# Patient Record
Sex: MALE | Race: WHITE | NOT HISPANIC OR LATINO | Employment: FULL TIME | URBAN - METROPOLITAN AREA
[De-identification: names, ages, dates, MRNs, and addresses within clinical notes are randomized per-mention and may not be internally consistent; named-entity substitution may affect disease eponyms.]

---

## 2018-01-29 ENCOUNTER — HISTORICAL (OUTPATIENT)
Dept: PREADMISSION TESTING | Facility: HOSPITAL | Age: 48
End: 2018-01-29

## 2018-01-29 LAB
ABS NEUT (OLG): 3 X10(3)/MCL (ref 2.1–9.2)
BUN SERPL-MCNC: 12 MG/DL (ref 7–18)
CALCIUM SERPL-MCNC: 9.3 MG/DL (ref 8.5–10.1)
CHLORIDE SERPL-SCNC: 101 MMOL/L (ref 98–107)
CO2 SERPL-SCNC: 28 MMOL/L (ref 21–32)
CREAT SERPL-MCNC: 0.82 MG/DL (ref 0.7–1.3)
EOSINOPHIL NFR BLD MANUAL: 6 % (ref 0–8)
ERYTHROCYTE [DISTWIDTH] IN BLOOD BY AUTOMATED COUNT: 12.1 % (ref 11.5–17)
GLUCOSE SERPL-MCNC: 82 MG/DL (ref 74–106)
HCT VFR BLD AUTO: 46.5 % (ref 42–52)
HGB BLD-MCNC: 15.3 GM/DL (ref 14–18)
LYMPHOCYTES NFR BLD MANUAL: 13 % (ref 13–40)
LYMPHOCYTES NFR BLD MANUAL: 4 %
MCH RBC QN AUTO: 29.8 PG (ref 27–31)
MCHC RBC AUTO-ENTMCNC: 32.9 GM/DL (ref 33–36)
MCV RBC AUTO: 90.6 FL (ref 80–94)
MONOCYTES NFR BLD MANUAL: 15 % (ref 2–11)
NEUTROPHILS NFR BLD MANUAL: 62 % (ref 47–80)
PLATELET # BLD AUTO: 160 X10(3)/MCL (ref 130–400)
PLATELET # BLD EST: NORMAL 10*3/UL
PMV BLD AUTO: 9.7 FL (ref 7.4–10.4)
POTASSIUM SERPL-SCNC: 4.2 MMOL/L (ref 3.5–5.1)
RBC # BLD AUTO: 5.13 X10(6)/MCL (ref 4.7–6.1)
SODIUM SERPL-SCNC: 136 MMOL/L (ref 136–145)
WBC # SPEC AUTO: 6 X10(3)/MCL (ref 4.5–11.5)

## 2018-02-16 ENCOUNTER — HISTORICAL (OUTPATIENT)
Dept: SURGERY | Facility: HOSPITAL | Age: 48
End: 2018-02-16

## 2018-03-12 ENCOUNTER — HISTORICAL (OUTPATIENT)
Dept: ADMINISTRATIVE | Facility: HOSPITAL | Age: 48
End: 2018-03-12

## 2020-05-04 ENCOUNTER — HISTORICAL (OUTPATIENT)
Dept: ADMINISTRATIVE | Facility: HOSPITAL | Age: 50
End: 2020-05-04

## 2020-05-12 ENCOUNTER — HISTORICAL (OUTPATIENT)
Dept: RADIOLOGY | Facility: HOSPITAL | Age: 50
End: 2020-05-12

## 2020-06-22 LAB
ABS NEUT (OLG): 2.22 X10(3)/MCL (ref 2.1–9.2)
BASOPHILS # BLD AUTO: 0.03 X10(3)/MCL (ref 0–0.2)
BASOPHILS NFR BLD AUTO: 0.6 % (ref 0–0.9)
BUN SERPL-MCNC: 15.4 MG/DL (ref 8.9–20.6)
CALCIUM SERPL-MCNC: 9.7 MG/DL (ref 8.4–10.2)
CHLORIDE SERPL-SCNC: 103 MMOL/L (ref 98–107)
CO2 SERPL-SCNC: 27 MMOL/L (ref 22–29)
CREAT SERPL-MCNC: 0.8 MG/DL (ref 0.72–1.25)
CREAT/UREA NIT SERPL: 19
EOSINOPHIL # BLD AUTO: 0.21 X10(3)/MCL (ref 0–0.9)
EOSINOPHIL NFR BLD AUTO: 4.4 % (ref 0–6.5)
ERYTHROCYTE [DISTWIDTH] IN BLOOD BY AUTOMATED COUNT: 12.3 % (ref 11.5–17)
GLUCOSE SERPL-MCNC: 104 MG/DL (ref 74–100)
HCT VFR BLD AUTO: 42.4 % (ref 42–52)
HGB BLD-MCNC: 14.5 GM/DL (ref 14–18)
IMM GRANULOCYTES # BLD AUTO: 0.03 10*3/UL (ref 0–0.02)
IMM GRANULOCYTES NFR BLD AUTO: 0.6 % (ref 0–0.43)
LYMPHOCYTES # BLD AUTO: 1.61 X10(3)/MCL (ref 0.6–4.6)
LYMPHOCYTES NFR BLD AUTO: 33.5 % (ref 16.2–38.3)
MCH RBC QN AUTO: 29.8 PG (ref 27–31)
MCHC RBC AUTO-ENTMCNC: 34.2 GM/DL (ref 33–36)
MCV RBC AUTO: 87.1 FL (ref 80–94)
MONOCYTES # BLD AUTO: 0.7 X10(3)/MCL (ref 0.1–1.3)
MONOCYTES NFR BLD AUTO: 14.6 % (ref 4.7–11.3)
NEUTROPHILS # BLD AUTO: 2.22 X10(3)/MCL (ref 2.1–9.2)
NEUTROPHILS NFR BLD AUTO: 46.3 % (ref 49.1–73.4)
NRBC BLD AUTO-RTO: 0 % (ref 0–0.2)
PLATELET # BLD AUTO: 160 X10(3)/MCL (ref 130–400)
PMV BLD AUTO: 9.9 FL (ref 7.4–10.4)
POTASSIUM SERPL-SCNC: 4.4 MMOL/L (ref 3.5–5.1)
RBC # BLD AUTO: 4.87 X10(6)/MCL (ref 4.7–6.1)
SODIUM SERPL-SCNC: 140 MMOL/L (ref 136–145)
WBC # SPEC AUTO: 4.8 X10(3)/MCL (ref 4.5–11.5)

## 2020-07-07 ENCOUNTER — HISTORICAL (OUTPATIENT)
Dept: SURGERY | Facility: HOSPITAL | Age: 50
End: 2020-07-07

## 2021-08-16 ENCOUNTER — HISTORICAL (OUTPATIENT)
Dept: ADMINISTRATIVE | Facility: HOSPITAL | Age: 51
End: 2021-08-16

## 2021-11-11 ENCOUNTER — HISTORICAL (OUTPATIENT)
Dept: LAB | Facility: HOSPITAL | Age: 51
End: 2021-11-11

## 2021-11-11 LAB
ABS NEUT (OLG): 3.53 X10(3)/MCL (ref 2.1–9.2)
BASOPHILS # BLD AUTO: 0.03 X10(3)/MCL (ref 0–0.2)
BASOPHILS NFR BLD AUTO: 0.5 % (ref 0–0.9)
BUN SERPL-MCNC: 19.5 MG/DL (ref 8.4–25.7)
CALCIUM SERPL-MCNC: 9.3 MG/DL (ref 8.4–10.2)
CHLORIDE SERPL-SCNC: 103 MMOL/L (ref 98–107)
CO2 SERPL-SCNC: 28 MMOL/L (ref 22–29)
CREAT SERPL-MCNC: 1.19 MG/DL (ref 0.72–1.25)
CREAT/UREA NIT SERPL: 16
EOSINOPHIL # BLD AUTO: 0.16 X10(3)/MCL (ref 0–0.9)
EOSINOPHIL NFR BLD AUTO: 2.6 % (ref 0–6.5)
ERYTHROCYTE [DISTWIDTH] IN BLOOD BY AUTOMATED COUNT: 12.6 % (ref 11.5–17)
GLUCOSE SERPL-MCNC: 87 MG/DL (ref 74–100)
HCT VFR BLD AUTO: 44.4 % (ref 42–52)
HGB BLD-MCNC: 14.7 GM/DL (ref 14–18)
IMM GRANULOCYTES # BLD AUTO: 0.03 10*3/UL (ref 0–0.02)
IMM GRANULOCYTES NFR BLD AUTO: 0.5 % (ref 0–0.43)
LYMPHOCYTES # BLD AUTO: 1.73 X10(3)/MCL (ref 0.6–4.6)
LYMPHOCYTES NFR BLD AUTO: 27.8 % (ref 16.2–38.3)
MCH RBC QN AUTO: 30.2 PG (ref 27–31)
MCHC RBC AUTO-ENTMCNC: 33.1 GM/DL (ref 33–36)
MCV RBC AUTO: 91.4 FL (ref 80–94)
MONOCYTES # BLD AUTO: 0.75 X10(3)/MCL (ref 0.1–1.3)
MONOCYTES NFR BLD AUTO: 12 % (ref 4.7–11.3)
NEUTROPHILS # BLD AUTO: 3.53 X10(3)/MCL (ref 2.1–9.2)
NEUTROPHILS NFR BLD AUTO: 56.6 % (ref 49.1–73.4)
NRBC BLD AUTO-RTO: 0 % (ref 0–0.2)
PLATELET # BLD AUTO: 175 X10(3)/MCL (ref 130–400)
PMV BLD AUTO: 9.7 FL (ref 7.4–10.4)
POTASSIUM SERPL-SCNC: 4.2 MMOL/L (ref 3.5–5.1)
RBC # BLD AUTO: 4.86 X10(6)/MCL (ref 4.7–6.1)
SODIUM SERPL-SCNC: 140 MMOL/L (ref 136–145)
WBC # SPEC AUTO: 6.2 X10(3)/MCL (ref 4.5–11.5)

## 2021-12-20 ENCOUNTER — HISTORICAL (OUTPATIENT)
Dept: LAB | Facility: HOSPITAL | Age: 51
End: 2021-12-20

## 2021-12-20 LAB — SARS-COV-2 AG RESP QL IA.RAPID: NEGATIVE

## 2021-12-21 ENCOUNTER — HISTORICAL (OUTPATIENT)
Dept: SURGERY | Facility: HOSPITAL | Age: 51
End: 2021-12-21

## 2022-04-10 ENCOUNTER — HISTORICAL (OUTPATIENT)
Dept: ADMINISTRATIVE | Facility: HOSPITAL | Age: 52
End: 2022-04-10
Payer: COMMERCIAL

## 2022-04-28 VITALS
BODY MASS INDEX: 27.77 KG/M2 | WEIGHT: 190.94 LBS | WEIGHT: 194 LBS | HEIGHT: 70 IN | HEIGHT: 70 IN | DIASTOLIC BLOOD PRESSURE: 77 MMHG | DIASTOLIC BLOOD PRESSURE: 74 MMHG | HEIGHT: 70 IN | BODY MASS INDEX: 28.35 KG/M2 | SYSTOLIC BLOOD PRESSURE: 129 MMHG | BODY MASS INDEX: 27.77 KG/M2 | WEIGHT: 198 LBS | SYSTOLIC BLOOD PRESSURE: 119 MMHG | DIASTOLIC BLOOD PRESSURE: 78 MMHG | WEIGHT: 194 LBS | HEIGHT: 70 IN | BODY MASS INDEX: 27.34 KG/M2 | SYSTOLIC BLOOD PRESSURE: 125 MMHG

## 2022-04-30 NOTE — OP NOTE
DATE OF SURGERY:    07/07/2020    SURGEON:  Zurdo Massey MD    SERVICE:  Orthopedics.    PREOPERATIVE DIAGNOSIS:  Common extensor tendon tear, lateral epicondylitis, left elbow.    POSTOPERATIVE DIAGNOSIS:  Common extensor tendon tear, lateral epicondylitis, left elbow.    PROCEDURE:    1. Debridement left elbow common extensor tendon.  2. Repair of common extensor tendon, left elbow.    ANESTHESIA:  Axillary block with IV sedation.    IV FLUIDS:  Per Anesthesia.    ESTIMATED BLOOD LOSS:  Less than 10 cc.    COUNTS:  Correct.    COMPLICATIONS:  None.    IMPLANTS:  Arthrex suture anchor.    DISPOSITION:  Stable to PACU.    INDICATIONS FOR PROCEDURE:  Mr. Shaffer is a 49-year-old male with continued pain, loss of motion of his left elbow.  He has failed multiple conservative treatments.  He has been followed in my clinic.  He had an MRI demonstrating the above findings.  Risks, benefits, and alternatives were discussed with the patient in detail.  All questions were answered.  Informed consent was obtained.    PROCEDURE IN DETAIL:  Patient was found in preoperative holding by Anesthesia and found fit for surgery.  He was taken to the operating room and placed on the operating table in supine position with all bony prominences well padded.  Time-out was called to identify correct patient, correct procedure, and correct site, and all were in agreement.  The patient underwent IV sedation.  He already received his axillary block and IV antibiotics.  He was then prepped and draped in the normal sterile fashion leaving the left upper extremity exposed for surgery.  After exsanguination of the left upper extremity, tourniquet was inflated.  Attention was turned to the lateral aspect of the left elbow where an incision was made over the lateral epicondyle area, approximately 4 cm.  Soft tissue dissection down to the fascia where it was incised.  Next, the common extensor tendon was identified.  It was split in line  with its fibers.  Next, the tendon tear was appreciated off the bone of the lateral epicondyle region.  There was some degeneration of this tear as well.  With use of rongeur, the debridement of the tendon then occurred.  He also had scraping with the use of curette to increase the blood flow and healing process of this common extensor tendon tear.  After this was done, given the defect, the patient had repair of the common extensor tendon back down to the lateral epicondyle region with the use of Arthrex suture anchor.  After this was done, there were no additional tears appreciated.  His elbow was stable to stressing.  Tourniquet was released.  Hemostasis achieved.  Copious irrigation was used to wash the wound.  The fascia was closed with 0 Vicryl, subcutaneous tissue closed with 2-0 Vicryl.  Skin was closed with 3-0 nylon sutures in a standard interrupted fashion.  Xeroform, 4 x 4's, soft tissue dressing, sling were placed on the left upper extremity.  He was awoken by Anesthesia and brought to PACU in stable condition.        ______________________________  MD CANDIDA Buchanan/TIEN  DD:  07/08/2020  Time:  10:29AM  DT:  07/08/2020  Time:  11:25AM  Job #:  [id number]

## 2022-04-30 NOTE — OP NOTE
DATE OF SURGERY:    02/16/2018    SURGEON:  Zurdo Massey MD    PREOPERATIVE DIAGNOSES:  Right shoulder calcific tendonitis, rotator cuff tear, labral tearing, subacromial impingement.    POSTOPERATIVE DIAGNOSIS:  Right shoulder calcific tendonitis, rotator cuff tear, labral tearing, subacromial impingement.    PROCEDURE:    1. Right shoulder arthroscopy with debridement of partial labral tear, biceps tendon and rotator cuff.  2. Arthroscopic subacromial decompression.  3. Arthroscopic debridement and removal of calcific deposits.    ASSISTANT:  Irvin Narayan CST    ANESTHESIA:  LMA with IV fluids.    ESTIMATED BLOOD LOSS:  Less than 10 mL.    INSTRUMENT COUNT:  Counts are correct.    COMPLICATIONS:  None.    DISPOSITION:  Stable to PACU.    INDICATIONS:  The patient is a 47-year-old male with continued pain and loss of motion of right shoulder.  He has failed multiple conservative treatments.  MRI demonstrated the above findings.  Risks and benefits were discussed with the patient and family in detail.  Risks including, but not limited to pain, bleeding, infection, damage to blood vessels or nerves, heart attack, stroke, death, need for operation, continued pain, continued loss of motion, retear, post-traumatic arthritis, need for additional surgery.  The patient understood these risks and wanted to proceed with surgical intervention.  Informed consent was obtained.    PROCEDURE IN DETAIL:  The patient was found in preoperative holding by Anesthesia and found fit for surgery.  He was taken to the operating room and placed on the operating table in the supine position.  All bony prominences were well padded.  Time out was called identifying correct patient, correct procedure and correct site.  All were in agreement.  The patient underwent LMA anesthesia without any complications.  He was then prepped and draped in the normal sterile fashion leaving the right upper extremity exposed for surgery.  He was in  modified beach chair position.  He received his preoperative antibiotics.  Next through the posterior portal with good backflow a 1 cm incision was made.  Camera was introduced into the glenohumeral joint.  After this was done, anterior portal was made through a 1-cm incision just lateral to the tip of the coracoid.  After this was done, examination of the glenohumeral joint demonstrating some fraying of anterior labrum.  It was probed and found to be otherwise stable.  With use of 3.5 shaver, the fraying was removed.  He also had some erythema about the biceps tendon and the biceps tendon looked appropriate otherwise.  No dislocation or subluxation or tearing obviously appreciated of the biceps tendon.  He had some minimal tearing of the posterior labrum, which also underwent a limited debridement, otherwise it was stable.  Inferiorly the labrum was intact.  He had minimal arthritic change of the glenohumeral joint.  Inferior recess was inspected with no loose bodies.  Camera was introduced laterally.  Where the camera was removed lateral, where the rotator cuff was identified, minimal fraying of the rotator cuff underwent limited debridement, otherwise it was intact.  Given this, the camera was introduced into the subacromial space.  Lateral portal was made through 1-inch incision.  The patient underwent arthroscopic subacromial decompression.  A large amount of bursal tissue was removed.  He also had three to four large deposits of calcium.  With use of grasper, these deposits were removed.  The rotator cuff was also identified on the bursal surface and found to be intact without any signs of bursal tearing.  Copious irrigation was used to wash the three incisions.  The incisions were closed with 2-0 Vicryl sutures and 3-0 Monocryl.  Mastisol, Steri-Strips, Xeroform, 4x4, Tegaderm and shoulder sling was placed in the right upper extremity.  He was awoken from anesthesia and brought to PACU in stable  condition.        ______________________________  MD REYES Buchanan  DD:  02/19/2018  Time:  07:32AM  DT:  02/19/2018  Time:  01:31PM  Job #:  [id number]

## 2022-05-03 NOTE — HISTORICAL [facility]
This is a historical note converted from [employer]. Formatting and pictures may have been removed.  Please reference Dc for original formatting and attached multimedia. Chief Complaint  PT STATES PAIN STARTED ABOUT 9 MONTHS AGO WENT TO CLINIC AND GOT INJECTION DIDNT HELP, PAIN HASNT GOTTEN ANY BETTER, TOOK A ANTI-INFLAMMATORY AND TRAMADOL OVER THE WEEKEND, STATES IT HELPED A LITTLE  History of Present Illness  Patient returns today for repeat exam. ?Patient continues to have worsening pain about his left elbow. ?Patient states over the last 9 months he has had loss of motion,?he is tried rest medication?bracing as well as an injection without relief. ?He has been told it it was lateral epicondylitis.? He has not improved, has worsening pain and swelling,?denies any numbness or tingling denies other complaints.  Physical Exam  Vitals & Measurements  T:?37? ?C (Oral)? HR:?79(Peripheral)? RR:?20? BP:?125/77?  HT:?179?cm? WT:?88?kg? BMI:?27.46?  Left upper extremity form soft and warm. ?Skin is intact. ?There are no signs or symptoms of DVT or infection. ?He is point tender along the lateral epicondyle region.? He does have a palpable defect. ?He does have pain with?resisted?wrist extension. ?He otherwise has full pronation supination flexion-extension negative hook sign, he is neurovascular tact distally. ?X-rays?2 views of the left elbow demonstrate obvious fracture dislocation.  Assessment/Plan  1.?Left lateral epicondylitis?M77.12  ?At this time we discussed his physical exam and x-ray findings. ?Patient patient has worsening lateral epicondylitis, he is failed more conservative treatments over the last 9+ months. ?We will proceed with an MRI of his left elbow, would like to see him back later this week for his results.  Ordered:  Clinic Follow up, *Est. 05/11/20 3:00:00 CDT, Order for future visit, Left lateral epicondylitis  Extensor tendon disruption  Elbow pain, [facility]  MRI Ext Upper Joint Left W/O  Contrast, Routine, 05/04/20 13:52:00 CDT, Other (please specify), Elbow trauma, nondiagnostic xray, None, Stretcher, Patient Has IV?, Rad Type, Order for future visit, Left lateral epicondylitis  Extensor tendon disruption  Elbow pain, Schedule this test, Laf...  Office/Outpatient Visit Level 3 Established [id number] PC, Left lateral epicondylitis  Extensor tendon disruption  Elbow pain, [facility]ics Clinic, 05/04/20 13:52:00 CDT  ?  2.?Extensor tendon disruption?M67.89  Ordered:  Clinic Follow up, *Est. 05/11/20 3:00:00 CDT, Order for future visit, Left lateral epicondylitis  Extensor tendon disruption  Elbow pain, Orthopaedics  MRI Ext Upper Joint Left W/O Contrast, Routine, 05/04/20 13:52:00 CDT, Other (please specify), Elbow trauma, nondiagnostic xray, None, Stretcher, Patient Has IV?, Rad Type, Order for future visit, Left lateral epicondylitis  Extensor tendon disruption  Elbow pain, Schedule this test, Laf...  Office/Outpatient Visit Level 3 Established [id number] PC, Left lateral epicondylitis  Extensor tendon disruption  Elbow pain, [facility], 05/04/20 13:52:00 CDT  ?  Orders:  XR Elbow Left 2 Views, Routine, 05/04/20 13:41:00 CDT, Pain, None, Stretcher, Patient Has IV?, Rad Type, Elbow pain, Not Scheduled, 05/04/20 13:41:00 CDT  Referrals  Clinic Follow up, *Est. 05/11/20 3:00:00 CDT, Order for future visit, Left lateral epicondylitis  Extensor tendon disruption  Elbow pain, [facility]   Problem List/Past Medical History  Ongoing  Bicipital tendinitis, right shoulder  Labral tear of shoulder  Sprain of right hand  Historical  No qualifying data  Procedure/Surgical History  Arthroscopy Shoulder (Right) (02/16/2018)  Arthroscopy, shoulder, surgical; decompression of subacromial space with partial acromioplasty, with coracoacromial ligament (ie, arch) release, when performed (List separately in addition to code for primary procedure) (02/16/2018)  Arthroscopy, shoulder, surgical; with  rotator cuff repair (02/16/2018)  Excision Bone Spur (Right) (02/16/2018)  Excision of Right Shoulder Tendon, Percutaneous Endoscopic Approach (02/16/2018)  Release Right Shoulder Joint, Percutaneous Endoscopic Approach (02/16/2018)  Shoulder Decompression (Right) (02/16/2018)  Fluoroscopic guidance for needle placement (eg, biopsy, aspiration, injection, localization device) (12/28/2016)  Fluoroscopy of Right Shoulder using Low Osmolar Contrast (12/28/2016)  Injection procedure for shoulder arthrography or enhanced CT/MRI shoulder arthrography (12/28/2016)  Magnetic resonance (eg, proton) imaging, any joint of upper extremity; with contrast material(s) (12/28/2016)  Magnetic Resonance Imaging (MRI) of Right Shoulder using Other Contrast (12/28/2016)  Fusion (2014)  Microdiscectomy (2007)   Medications  No active medications  Allergies  No Known Allergies  No Known Medication Allergies  Social History  Alcohol  Current, Beer, Wine, Liquor, 1-2 times per month, 12/15/2016  Substance Use  Never, 12/15/2016  Tobacco  Never smoker, 12/15/2016  Family History  Acute myocardial infarction.: Father.  Diabetes mellitus type 2: Mother and Father.  Hyperlipidemia.: Mother and Father.  Hypertension.: Mother and Father.  Stroke: Mother.  Health Maintenance  Health Maintenance  ???Pending?(in the next year)  ??? ??Due?  ??? ? ? ?ADL Screening due??05/04/20??and every 1??year(s)  ??? ? ? ?Aspirin Therapy for CVD Prevention due??05/04/20??and every 1??year(s)  ??? ? ? ?Tetanus Vaccine due??05/04/20??and every 10??year(s)  ??? ??Due In Future?  ??? ? ? ?Alcohol Misuse Screening not due until??01/01/21??and every 1??year(s)  ??? ? ? ?Obesity Screening not due until??01/01/21??and every 1??year(s)  ???Satisfied?(in the past 1 year)  ??? ??Satisfied?  ??? ? ? ?Alcohol Misuse Screening on??05/04/20.??Satisfied by Jennifer Trevino  ??? ? ? ?Blood Pressure Screening on??05/04/20.??Satisfied by Jennifer Trevino  ??? ? ? ?Body Mass Index  Check on??05/04/20.??Satisfied by Jennifer Trevino  ??? ? ? ?Obesity Screening on??05/04/20.??Satisfied by Jennifer Trevino  ?

## 2022-05-03 NOTE — HISTORICAL [facility]
This is a historical note converted from Dc. Formatting and pictures may have been removed.  Please reference Dc for original formatting and attached multimedia. Chief Complaint  Rt shoulder pain, SAD & labral tear sx done by [facility] 2/19/2018; Rt hip pain,no prior sx. - pt went to a golf tournament last week, he currently has shin splints - TD  History of Present Illness  Patient comes in today complaining of right shoulder pain. ?Patient had a previous?shoulder arthroscopy 3 years ago. ?He does have a history of calcific tendinitis. ?He states more recently has been bothering him in certain positions, overhead activities. ?He also complains of right hip pain. ?He is told in the past he needs?a hip replacement. ?He states he aggravated it while playing golf last week.  Physical Exam  Vitals & Measurements  HT:?177.80?cm? WT:?86.600?kg? BMI:?27.39?  Right upper extremity compartment soft and warm. ?Skin is intact. ?There is no signs or symptoms of DVT or infection. ?He is tender to patient along the lateral aspect of the right shoulder positive Andrade positive into can sign negative drop arm is able to forward flex and abduct 170 degrees he is stable to stressing negative OBriens, neurovascular tact distally.? Examination of the right hip he is tender along the anterior and anterior lateral aspect of the right hip joint?he is able to flex 90 degrees X rotate 25 degrees internal take 10 degrees. ?There is no long track signs he is neurovascular tact distally. 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 3 views of the right shoulder demonstrates calcific tendinitis underlying impingement  Assessment/Plan  1.?Osteoarthritis of right hip?M16.11  ?At this time we discussed his physical exam and x-ray findings. ?We have discussed various treatment options. ?He will continue low impact activities for his hip. ?In regards to his  shoulder?we have discussed some anti-inflammatories with appropriate precautions we will hold off on a shoulder arthroscopy. ?Under sterile technique he tolerated a subacromial steroid injection very well to the right shoulder.? This was 2 cc of dexamethasone 3 cc of 2 sent lidocaine without. ?He tolerated this very well.? I would like to see him back in 6 weeks to see how he is progressing.  Ordered:  asp/inj jnt/bursa, [facility] [id number] , 08/16/21 10:50:00 CDT, [facility], Routine, 08/16/21 10:50:00 CDT, Osteoarthritis of right hip  Impingement syndrome of right shoulder  Calcific tendinitis  Clinic Follow up, *Est. 09/27/21 3:00:00 CDT, Order for future visit, Osteoarthritis of right hip  Impingement syndrome of right shoulder  Calcific tendinitis, Orthopaedics  Office/Outpatient Visit Level 4 Established [id number] PC, Osteoarthritis of right hip  Impingement syndrome of right shoulder  Calcific tendinitis, [facility]ics Clinic, 08/16/21 10:50:00 CDT  ?  2.?Impingement syndrome of right shoulder?M75.41  Ordered:  asp/inj jnt/bursa, [facility] [id number] , 08/16/21 10:50:00 CDT, [facility], Routine, 08/16/21 10:50:00 CDT, Osteoarthritis of right hip  Impingement syndrome of right shoulder  Calcific tendinitis  Clinic Follow up, *Est. 09/27/21 3:00:00 CDT, Order for future visit, Osteoarthritis of right hip  Impingement syndrome of right shoulder  Calcific tendinitis, Orthopaedics  Office/Outpatient Visit Level 4 Established [id number] PC, Osteoarthritis of right hip  Impingement syndrome of right shoulder  Calcific tendinitis, [facility] Clinic, 08/16/21 10:50:00 CDT  ?  3.?Calcific tendinitis?M65.20  Ordered:  asp/inj jnt/bursa, [facility] [id number] , 08/16/21 10:50:00 CDT, [facility] Clinic, Routine, 08/16/21 10:50:00 CDT, Osteoarthritis of right hip  Impingement syndrome of right shoulder  Calcific tendinitis  Clinic Follow up, *Est. 09/27/21 3:00:00 CDT, Order for future visit,  Osteoarthritis of right hip  Impingement syndrome of right shoulder  Calcific tendinitis, [facility]  Office/Outpatient Visit Level 4 Established [id number] PC, Osteoarthritis of right hip  Impingement syndrome of right shoulder  Calcific tendinitis, Orthopa[facility]ics Clinic, 08/16/21 10:50:00 CDT  ?  Referrals  Clinic Follow up, *Est. 09/27/21 3:00:00 CDT, Order for future visit, Osteoarthritis of right hip  Impingement syndrome of right shoulder  Calcific tendinitis, [facility]   Problem List/Past Medical History  Ongoing  No qualifying data  Historical  Bicipital tendinitis, right shoulder  Labral tear of shoulder  Sprain of right hand  Procedure/Surgical History  Excision of Left Upper Arm Subcutaneous Tissue and Fascia, Open Approach (07/07/2020)  Repair Left Upper Arm Tendon, Open Approach (07/07/2020)  Tendon Repair (Left) (07/07/2020)  Tenotomy, elbow, lateral or medial (eg, epicondylitis, tennis elbow, golfers elbow); debridement, soft tissue and/or bone, open with tendon repair or reattachment (07/07/2020)  Arthroscopy Shoulder (Right) (02/16/2018)  Arthroscopy, shoulder, surgical; decompression of subacromial space with partial acromioplasty, with coracoacromial ligament (ie, arch) release, when performed (List separately in addition to code for primary procedure) (02/16/2018)  Arthroscopy, shoulder, surgical; with rotator cuff repair (02/16/2018)  Excision Bone Spur (Right) (02/16/2018)  Excision of Right Shoulder Tendon, Percutaneous Endoscopic Approach (02/16/2018)  Release Right Shoulder Joint, Percutaneous Endoscopic Approach (02/16/2018)  Shoulder Decompression (Right) (02/16/2018)  Fluoroscopic guidance for needle placement (eg, biopsy, aspiration, injection, localization device) (12/28/2016)  Fluoroscopy of Right Shoulder using Low Osmolar Contrast (12/28/2016)  Injection procedure for shoulder arthrography or enhanced CT/MRI shoulder arthrography (12/28/2016)  Magnetic resonance (eg,  proton) imaging, any joint of upper extremity; with contrast material(s) (12/28/2016)  Magnetic Resonance Imaging (MRI) of Right Shoulder using Other Contrast (12/28/2016)  Fusion (2014)  Microdiscectomy (2007)   Medications  diclofenac sodium 75 mg oral delayed release tablet, 75 mg= 1 tab(s), Oral, TID  Norco 5 mg-325 mg oral tablet, 1 tab(s), Oral, q6hr, PRN,? ?Not taking: Last Dose Date/Time Unknown  Allergies  No Known Allergies  No Known Medication Allergies  Social History  Abuse/Neglect  No, No, Yes, 08/16/2021  Alcohol  Current, Beer, Wine, Liquor, 1-2 times per month, 12/15/2016  Substance Use  Never, 12/15/2016  Tobacco  Never (less than 100 in lifetime), No, 08/16/2021  Family History  Acute myocardial infarction.: Father.  Diabetes mellitus type 2: Mother and Father.  Hyperlipidemia.: Mother and Father.  Hypertension.: Mother and Father.  Stroke: Mother.  Health Maintenance  Health Maintenance  ???Pending?(in the next year)  ??? ??OverDue  ??? ? ? ?Alcohol Misuse Screening due??01/02/21??and every 1??year(s)  ??? ??Due?  ??? ? ? ?Depression Screening due??06/22/21??and every 1??year(s)  ??? ? ? ?ADL Screening due??08/16/21??and every 1??year(s)  ??? ? ? ?Aspirin Therapy for CVD Prevention due??08/16/21??and every 1??year(s)  ??? ? ? ?Colorectal Screening due??08/16/21??Unknown Frequency  ??? ? ? ?Lipid Screening due??08/16/21??Unknown Frequency  ??? ? ? ?Tetanus Vaccine due??08/16/21??and every 10??year(s)  ??? ? ? ?Zoster Vaccine due??08/16/21??Unknown Frequency  ??? ??Due In Future?  ??? ? ? ?Blood Pressure Screening not due until??08/20/21??and every 1??year(s)  ??? ? ? ?Body Mass Index Check not due until??08/20/21??and every 1??year(s)  ??? ? ? ?Obesity Screening not due until??01/01/22??and every 1??year(s)  ???Satisfied?(in the past 1 year)  ??? ??Satisfied?  ??? ? ? ?Blood Pressure Screening on??08/20/20.??Satisfied by Angela Mariee  ??? ? ? ?Body Mass Index Check  on??08/16/21.??Satisfied by Gloria Narayan  ??? ? ? ?Obesity Screening on??08/16/21.??Satisfied by Gloria Narayan  ?

## 2022-05-03 NOTE — HISTORICAL [facility] C[facility]ER
This is a historical note converted from Dc. Formatting and pictures may have been removed.  Please reference Dc for original formatting and attached multimedia. History of Present Illness  Presents today for preoperative evaluation for right?shoulder arthroscopy, debridement, subacromial decompression.. I reviewed the indications for surgery. The risks and benefits of the proposed and alternative treatments were discussed with the patient. Questions pertinent to the procedure were solicited and answered. No assurances were given. Informed consent was obtained. The patient expressed good understanding and wished to proceed with scheduling the procedure.  Review of Systems  Constitutional: No fever, weakness, or fatigue.?  Ear/Nose/Mouth/Throat: No nasal congestion or sore throat.?  Respiratory: No shortness of breath or cough.?  Cardiovascular: No chest pain, palpitations, or peripheral edema.?  Gastrointestinal: No nausea, vomiting, or abdominal pain.?  Genitourinary: No dysuria.  Musculoskeletal: Right shoulder pain, swelling, loss of motion.  ?  Physical Exam  Appearance: No distress, good color on room air. Alert and cooperative.  HEENT: Normocephalic. PERRLA EOM intact. Nose and throat clear.  Lungs: Clear to auscultation and percussion.  Heart: Regular rate and rhythm without murmurs or gallops.  Abdomen: Soft non tender. Bowel sounds positive. No rebound tenderness.  Extremities: Right upper extremity compartments are soft and warm. ?Skin is intact. ?There is no signs or symptoms of DVT or infection. ?Remains to be tender on the lateral aspect of the right shoulder. positive Andrade. positive empty can sign. negative drop arm. He?is able to forward flex and abduct?140 degrees. he is otherwise stable to stressing and?is neurovascularly intact distally.  Skin: No rashes or open wounds.  ?  Assessment/Plan  Plan for right shoulder arthroscopy, debridement, subacromial decompression at [[facility]].  The patient has been given preoperative instructions and prescriptions for post-operative medication. Post-operative appointment is scheduled for 2 weeks.   Problem List/Past Medical History  Ongoing  No qualifying data  Historical  Bicipital tendinitis, right shoulder  Labral tear of shoulder  Sprain of right hand  Procedure/Surgical History  Excision of Left Upper Arm Subcutaneous Tissue and Fascia, Open Approach (07/07/2020)  Repair Left Upper Arm Tendon, Open Approach (07/07/2020)  Tendon Repair (Left) (07/07/2020)  Tenotomy, elbow, lateral or medial (eg, epicondylitis, tennis elbow, golfers elbow); debridement, soft tissue and/or bone, open with tendon repair or reattachment (07/07/2020)  Arthroscopy Shoulder (Right) (02/16/2018)  Arthroscopy, shoulder, surgical; decompression of subacromial space with partial acromioplasty, with coracoacromial ligament (ie, arch) release, when performed (List separately in addition to code for primary procedure) (02/16/2018)  Arthroscopy, shoulder, surgical; with rotator cuff repair (02/16/2018)  Excision Bone Spur (Right) (02/16/2018)  Excision of Right Shoulder Tendon, Percutaneous Endoscopic Approach (02/16/2018)  Release Right Shoulder Joint, Percutaneous Endoscopic Approach (02/16/2018)  Shoulder Decompression (Right) (02/16/2018)  Fluoroscopic guidance for needle placement (eg, biopsy, aspiration, injection, localization device) (12/28/2016)  Fluoroscopy of Right Shoulder using Low Osmolar Contrast (12/28/2016)  Injection procedure for shoulder arthrography or enhanced CT/MRI shoulder arthrography (12/28/2016)  Magnetic resonance (eg, proton) imaging, any joint of upper extremity; with contrast material(s) (12/28/2016)  Magnetic Resonance Imaging (MRI) of Right Shoulder using Other Contrast (12/28/2016)  Fusion (2014)  Microdiscectomy (2007)   Medications  Inpatient  No active inpatient medications  Home  buPROPion 75 mg oral tablet, 75 mg= 1 tab(s), Oral,  TID  dextromethorphan-promethazine 15 mg-6.25 mg/5 mL oral syrup, 5 mL, Oral, q6hr,? ?Not Taking, Completed Rx  diclofenac sodium 75 mg oral delayed release tablet, 75 mg= 1 tab(s), Oral, TID  Norco 5 mg-325 mg oral tablet, 1 tab(s), Oral, q6hr, PRN,? ?Not taking: Last Dose Date/Time Unknown  Pulmicort Flexhaler 90 mcg/inh inhalation powder, 180 mcg, INH, BID,? ?Not taking  Allergies  No Known Allergies  No Known Medication Allergies  Social History  Abuse/Neglect  No, No, Yes, 12/15/2021  Alcohol  Current, Beer, Wine, Liquor, 1-2 times per month, 12/15/2016  Employment/School  Employed, 12/15/2021  Home/Environment  Lives with Spouse., 12/15/2021  Nutrition/Health  Regular, 12/15/2021  Spiritual/Cultural  Hindu, 12/15/2021  Substance Use  Never, 12/15/2016  Tobacco  Never (less than 100 in lifetime), No, 12/15/2021  Family History  Acute myocardial infarction.: Father.  Diabetes mellitus type 2: Mother and Father.  Hyperlipidemia.: Mother and Father.  Hypertension.: Mother and Father.  Stroke: Mother.

## 2022-05-03 NOTE — HISTORICAL [facility] C[facility]
This is a historical note converted from Dc. Formatting and pictures may have been removed.  Please reference Dc for original formatting and attached multimedia. DATE OF SURGERY: ? ?12/21/21  ?  SURGEON: ?Zurdo Massey MD  Assistant? CHEMA Ryan was essential throughout key and critical portions of the case including?shoulder manipulation?soft tissue retraction and?closure of portals  ?   [facility]:?[facility]  ?  PREOPERATIVE DIAGNOSES: ?Right?shoulder partial rotator cuff tear, calcific tendinitis, subacromial impingement  POSTOPERATIVE DIAGNOSES:?Same as above.  ?  PROCEDURE: ?  1.??Right?shoulder arthroscopy and debridement partial rotator?cuff tear  2.??Arthroscopic right shoulder subacromial decompression, debridement of calcific?deposits  ?  ?  ANESTHESIA: ?LMA.  ?  IV FLUIDS: ?See Anesthesia.  ?  ESTIMATED BLOOD LOSS: ?Less than 20 cc.  ?  COUNTS: ?Correct.  ?  COMPLICATIONS: ?None.  ?  CONDITION: ?Stable.  ?  INDICATIONS FOR PROCEDURE:??Allen Shaffer?is a?51 Years?-old?Male?with continued pain and loss of motion of?the?right?shoulder.??The patient?has failed multiple conservative treatments.??The patient?had an MRI demonstrating the above findings. ?The risks, benefits, and alternatives were discussed with the patient in detail. ?All questions were answered. ?Informed consent was obtained.  ?  PROCEDURE IN DETAIL: ?Patient was found in preoperative holding by Anesthesia and found fit for surgery.??The patient was taken to the operating room and placed on the operating table in supine position. ?All bony prominences were well padded. ?Timeout was called to identify correct patient, correct procedure, correct site, and all were in agreement. The patient underwent LMA anesthesia without complications.??The patient?was then prepped and draped in normal sterile fashion, leaving the?affected upper extremity exposed for surgery.??The patient?was in the modified beach-chair position and received  preoperative antibiotics. ?Next, through the posterior portal with good backflow, a 1 cm incision was made. ?Camera was introduced into the?glenohumeral joint. ?After this was done, an anterior portal was made through a 1 cm incision just lateral to the tip of the coracoid.? Next examination of the glenohumeral joint demonstrated?very minimal if any arthritic changes both on the glenoid and humeral head side.? The labrum was grossly intact,?the biceps anchor was appropriate. ?The biceps tendon tracked appropriately, there was some erythema?and mild degenerative changes of the biceps tendon in the groove. ?There is no maltracking. ?Tender to turn inferior, there is no loose bodies. ?Attention was turned?laterally where patient had a very small?partial rotator cuff tear which was debrided with a 3.5 shaver.? Remaining cuff was appropriate. ?At this time the?camera was introduced?into the subacromial space and a lateral portal was then made.? Next patient had a large amount of scar tissue in the subacromial space. ?With use of 3.5 shaver and?cautery patient underwent a subacromial decompression. ?He also had some spurring of the tip of the acromion?which was removed with a vortex bone shaver. ?This opened up his subacromial space a lot.? After is done the rotator cuff was identified on the bursal surface,?and he had some calcific tendinitis,?small deposits, which was debrided with a 3.5 shaver. ?Otherwise the rotator cuff is intact.? Patient had no signs of a full-thickness rotator cuff tear.? After is done hemostasis K she is to wash 3 portals they were then closed with 2-0 Vicryl and 3 Monocryl suture?Mastisol Steri-Strips Xeroform 4 x 4 soft tissue dressing and a shoulder sling was placed on right upper extremity. ?He was then awoken by anesthesia and brought to the PACU in stable condition.  ?  ______________________________  Zurdo Massey MD  ?

## 2022-05-03 NOTE — HISTORICAL [facility] C[facility]
This is a historical note converted from Dc. Formatting and pictures may have been removed.  Please reference Dc for original formatting and attached multimedia. Chief Complaint  low back pain, history of back surgery 3 yrs ago.  History of Present Illness  Patient comes in today complaining of low back pain. ?This is unrelated to his previous surgery 4 weeks ago. ?Patient states he had a previous fusion at L5-S1 3 years ago. ?He states he is doing well up until a couple weeks ago he has noticed some localized lower back pain.? He is interested in some x-rays. ?He denies any specific trauma. ?He denies any radiculopathy numbness or tingling he denies any bowel bladder dysfunction. ?He is not taking any pain medication for his lower back.  Physical Exam  Vitals & Measurements  HR:?74(Peripheral)? BP:?119/78?  HT:?179?cm? HT:?179?cm? WT:?88?kg? WT:?88?kg? BMI:?27.46?  Examination lumbar spine he is mildly tender to palpation about the paraspinal muscles both on the left and right is nontender over the spinous processes. ?He is able to flex 90? extend 10? and rotate 25?.? He has 5 out of 5 strength from L2 through S1. ?Sensation is intact to light touch?downgoing Babinski?negative clonus. ?2+ reflexes at L4 and S1?bilaterally.? He walks with a normal gait. ?X-rays 3 views of the lumbar spine?demonstrates a L5-S1 fusion.  Assessment/Plan  1.?Lumbar spondylosis  ? At this time we discussed his physical exam and x-ray findings. ?We have discussed some low impact and core strengthening exercises. ?He will follow-up with his previous neurosurgeon. ?Patient understands to call or return sooner if there is any new problems or difficulties.  Ordered:  Office/Outpatient Visit Level 4 Established [id number] PC, Lumbar spondylosis  History of lumbar fusion, LGMD [facility], 03/12/18 8:46:00 CDT  ?  2.?History of lumbar fusion  Ordered:  Office/Outpatient Visit Level 4 Established [id number] PC, Lumbar spondylosis  History  of lumbar fusion, [facility]MD [facility], 03/12/18 8:46:00 CDT  ?  Low back pain  ?  Orders:  Clinic Follow-up PRN, 03/12/18 8:46:00 CDT, Future Order, [facility]   Problem List/Past Medical History  Ongoing  Bicipital tendinitis, right shoulder  Labral tear of shoulder  Sprain of right hand  Historical  No qualifying data  Procedure/Surgical History  Arthroscopy Shoulder (Right) (02/16/2018), Arthroscopy, shoulder, surgical; decompression of subacromial space with partial acromioplasty, with coracoacromial ligament (ie, arch) release, when performed (List separately in addition to code for primary procedure) (02/16/2018), Arthroscopy, shoulder, surgical; with rotator cuff repair (02/16/2018), Excision Bone Spur (Right) (02/16/2018), Excision of Right Shoulder Tendon, Percutaneous Endoscopic Approach (02/16/2018), Release Right Shoulder Joint, Percutaneous Endoscopic Approach (02/16/2018), Shoulder Decompression (Right) (02/16/2018), Fluoroscopic guidance for needle placement (eg, biopsy, aspiration, injection, localization device) (12/28/2016), Fluoroscopy of Right Shoulder using Low Osmolar Contrast (12/28/2016), Injection procedure for shoulder arthrography or enhanced CT/MRI shoulder arthrography (12/28/2016), Magnetic resonance (eg, proton) imaging, any joint of upper extremity; with contrast material(s) (12/28/2016), Magnetic Resonance Imaging (MRI) of Right Shoulder using Other Contrast (12/28/2016), Fusion (2014), Microdiscectomy (2007).  Medications  No active medications  Allergies  No Known Medication Allergies  Social History  Alcohol  Current, Beer, Wine, Liquor, 1-2 times per month, 12/15/2016  Substance Abuse  Never, 12/15/2016  Tobacco  Never smoker, 12/15/2016  Family History  Acute myocardial infarction.: Father.  Diabetes mellitus type 2: Mother and Father.  Hyperlipidemia.: Mother and Father.  Hypertension.: Mother and Father.  Stroke: Mother.

## 2022-05-26 ENCOUNTER — HOSPITAL ENCOUNTER (OUTPATIENT)
Dept: RADIOLOGY | Facility: CLINIC | Age: 52
Discharge: HOME | End: 2022-05-26
Attending: ORTHOPAEDIC SURGERY
Payer: COMMERCIAL

## 2022-05-26 ENCOUNTER — OFFICE VISIT (OUTPATIENT)
Dept: ORTHOPEDICS | Facility: CLINIC | Age: 52
End: 2022-05-26
Payer: COMMERCIAL

## 2022-05-26 VITALS
DIASTOLIC BLOOD PRESSURE: 72 MMHG | BODY MASS INDEX: 27.25 KG/M2 | TEMPERATURE: 98 F | WEIGHT: 190.38 LBS | HEART RATE: 84 BPM | HEIGHT: 70 IN | SYSTOLIC BLOOD PRESSURE: 116 MMHG

## 2022-05-26 DIAGNOSIS — M54.50 ACUTE LEFT-SIDED LOW BACK PAIN, UNSPECIFIED WHETHER SCIATICA PRESENT: ICD-10-CM

## 2022-05-26 DIAGNOSIS — M54.50 ACUTE LEFT-SIDED LOW BACK PAIN, UNSPECIFIED WHETHER SCIATICA PRESENT: Primary | ICD-10-CM

## 2022-05-26 DIAGNOSIS — M54.16 LUMBAR RADICULOPATHY: ICD-10-CM

## 2022-05-26 DIAGNOSIS — M47.816 LUMBAR SPONDYLOSIS: ICD-10-CM

## 2022-05-26 PROCEDURE — 1159F PR MEDICATION LIST DOCUMENTED IN MEDICAL RECORD: ICD-10-PCS | Mod: CPTII,,, | Performed by: ORTHOPAEDIC SURGERY

## 2022-05-26 PROCEDURE — 72100 X-RAY EXAM L-S SPINE 2/3 VWS: CPT | Mod: TC,,, | Performed by: ORTHOPAEDIC SURGERY

## 2022-05-26 PROCEDURE — 3078F DIAST BP <80 MM HG: CPT | Mod: CPTII,,, | Performed by: ORTHOPAEDIC SURGERY

## 2022-05-26 PROCEDURE — 1160F PR REVIEW ALL MEDS BY PRESCRIBER/CLIN PHARMACIST DOCUMENTED: ICD-10-PCS | Mod: CPTII,,, | Performed by: ORTHOPAEDIC SURGERY

## 2022-05-26 PROCEDURE — 99214 PR OFFICE/OUTPT VISIT, EST, LEVL IV, 30-39 MIN: ICD-10-PCS | Mod: ,,, | Performed by: ORTHOPAEDIC SURGERY

## 2022-05-26 PROCEDURE — 1160F RVW MEDS BY RX/DR IN RCRD: CPT | Mod: CPTII,,, | Performed by: ORTHOPAEDIC SURGERY

## 2022-05-26 PROCEDURE — 3008F BODY MASS INDEX DOCD: CPT | Mod: CPTII,,, | Performed by: ORTHOPAEDIC SURGERY

## 2022-05-26 PROCEDURE — 3078F PR MOST RECENT DIASTOLIC BLOOD PRESSURE < 80 MM HG: ICD-10-PCS | Mod: CPTII,,, | Performed by: ORTHOPAEDIC SURGERY

## 2022-05-26 PROCEDURE — 72100 PR  X-RAY LUMBAR SPINE 2/3 VW: ICD-10-PCS | Mod: TC,,, | Performed by: ORTHOPAEDIC SURGERY

## 2022-05-26 PROCEDURE — 1159F MED LIST DOCD IN RCRD: CPT | Mod: CPTII,,, | Performed by: ORTHOPAEDIC SURGERY

## 2022-05-26 PROCEDURE — 3074F SYST BP LT 130 MM HG: CPT | Mod: CPTII,,, | Performed by: ORTHOPAEDIC SURGERY

## 2022-05-26 PROCEDURE — 3074F PR MOST RECENT SYSTOLIC BLOOD PRESSURE < 130 MM HG: ICD-10-PCS | Mod: CPTII,,, | Performed by: ORTHOPAEDIC SURGERY

## 2022-05-26 PROCEDURE — 99214 OFFICE O/P EST MOD 30 MIN: CPT | Mod: ,,, | Performed by: ORTHOPAEDIC SURGERY

## 2022-05-26 PROCEDURE — 3008F PR BODY MASS INDEX (BMI) DOCUMENTED: ICD-10-PCS | Mod: CPTII,,, | Performed by: ORTHOPAEDIC SURGERY

## 2022-05-26 RX ORDER — TADALAFIL 5 MG/1
5 TABLET ORAL
COMMUNITY
Start: 2022-05-06

## 2022-05-26 RX ORDER — METHYLPREDNISOLONE 4 MG/1
TABLET ORAL
Qty: 1 EACH | Refills: 0 | Status: SHIPPED | OUTPATIENT
Start: 2022-05-26 | End: 2023-06-19

## 2022-05-26 RX ORDER — ATOMOXETINE 25 MG/1
25 CAPSULE ORAL 2 TIMES DAILY
COMMUNITY
Start: 2022-05-23 | End: 2023-06-20

## 2022-05-26 NOTE — PROGRESS NOTES
Subjective:    CC: Pain of the Lower Back and Back Pain (C/O constant aching pain in lower back, states also having inflammation in [place] legs at this time. States taking Voltaren and tylenol to help manage pain.)       HPI:  Patient comes in today complaining of lower back pain.  Patient has had a previous fusion back in 2012 with Dr. Apley.  Patient states he has had intermittent pain, he states more recently has been bothering for the last week.  He states he twisted his back.  He feels it is a disc.  Occasional have some pain in his thighs.  He denies any numbness or tingling.  He denies any bowel or bladder changes.  He denies any leg weakness.  He has been taking anti-inflammatories.    ROS: Refer to HPI for pertinent ROS. All other 12 point systems negative.    Objective:    Physical Exam:  Bilateral lower extremities compartment soft and warm.  Skin is intact.  There is no signs symptoms of DVT infection.  He does have paraspinal tenderness about his lumbar spine.  He has a negative straight leg raise.  He has a negative clonus bilaterally.  He has 5/5 strength from L2 through S1 6 neck to light touch.    Images:  X-rays three views lumbar spine demonstrates a previous fusion, no obvious loosening appreciated.  He also has some degenerative changes at the level above his fusion. Images Reviewed and discussed with patient.    Assessment:  1. Acute left-sided low back pain, unspecified whether sciatica present  - X-Ray Lumbar Spine Ap And Lateral; Future    2. Lumbar radiculopathy    3. Lumbar spondylosis        Plan:  At this time we discussed his physical exam and x-ray findings.  He will follow up with Dr. Apley we have discussed some lumbar stabilization strengthening exercises.  We also discussed a Medrol Dosepak with appropriate precautions.    Follow UP: Follow up if symptoms worsen or fail to improve.

## 2022-09-15 ENCOUNTER — HOSPITAL ENCOUNTER (OUTPATIENT)
Dept: RADIOLOGY | Facility: CLINIC | Age: 52
Discharge: HOME | End: 2022-09-15
Attending: ORTHOPAEDIC SURGERY
Payer: COMMERCIAL

## 2022-09-15 ENCOUNTER — OFFICE VISIT (OUTPATIENT)
Dept: ORTHOPEDICS | Facility: CLINIC | Age: 52
End: 2022-09-15
Payer: COMMERCIAL

## 2022-09-15 VITALS — BODY MASS INDEX: 27.2 KG/M2 | HEIGHT: 70 IN | WEIGHT: 190 LBS

## 2022-09-15 DIAGNOSIS — M25.811 SHOULDER IMPINGEMENT, RIGHT: ICD-10-CM

## 2022-09-15 DIAGNOSIS — R52 PAIN: ICD-10-CM

## 2022-09-15 DIAGNOSIS — M25.511 RIGHT SHOULDER PAIN, UNSPECIFIED CHRONICITY: Primary | ICD-10-CM

## 2022-09-15 DIAGNOSIS — M25.511 RIGHT SHOULDER PAIN, UNSPECIFIED CHRONICITY: ICD-10-CM

## 2022-09-15 DIAGNOSIS — M65.20 CALCIFIC TENDONITIS: ICD-10-CM

## 2022-09-15 DIAGNOSIS — M54.12 CERVICAL RADICULOPATHY: ICD-10-CM

## 2022-09-15 PROCEDURE — 3008F BODY MASS INDEX DOCD: CPT | Mod: CPTII,,, | Performed by: ORTHOPAEDIC SURGERY

## 2022-09-15 PROCEDURE — 99214 OFFICE O/P EST MOD 30 MIN: CPT | Mod: ,,, | Performed by: ORTHOPAEDIC SURGERY

## 2022-09-15 PROCEDURE — 1160F PR REVIEW ALL MEDS BY PRESCRIBER/CLIN PHARMACIST DOCUMENTED: ICD-10-PCS | Mod: CPTII,,, | Performed by: ORTHOPAEDIC SURGERY

## 2022-09-15 PROCEDURE — 99214 PR OFFICE/OUTPT VISIT, EST, LEVL IV, 30-39 MIN: ICD-10-PCS | Mod: ,,, | Performed by: ORTHOPAEDIC SURGERY

## 2022-09-15 PROCEDURE — 3008F PR BODY MASS INDEX (BMI) DOCUMENTED: ICD-10-PCS | Mod: CPTII,,, | Performed by: ORTHOPAEDIC SURGERY

## 2022-09-15 PROCEDURE — 1159F MED LIST DOCD IN RCRD: CPT | Mod: CPTII,,, | Performed by: ORTHOPAEDIC SURGERY

## 2022-09-15 PROCEDURE — 72040 X-RAY EXAM NECK SPINE 2-3 VW: CPT | Mod: ,,, | Performed by: ORTHOPAEDIC SURGERY

## 2022-09-15 PROCEDURE — 73030 XR SHOULDER COMPLETE 2 OR MORE VIEWS RIGHT: ICD-10-PCS | Mod: RT,,, | Performed by: ORTHOPAEDIC SURGERY

## 2022-09-15 PROCEDURE — 1160F RVW MEDS BY RX/DR IN RCRD: CPT | Mod: CPTII,,, | Performed by: ORTHOPAEDIC SURGERY

## 2022-09-15 PROCEDURE — 73030 X-RAY EXAM OF SHOULDER: CPT | Mod: RT,,, | Performed by: ORTHOPAEDIC SURGERY

## 2022-09-15 PROCEDURE — 1159F PR MEDICATION LIST DOCUMENTED IN MEDICAL RECORD: ICD-10-PCS | Mod: CPTII,,, | Performed by: ORTHOPAEDIC SURGERY

## 2022-09-15 PROCEDURE — 72040 XR CERVICAL SPINE 2 OR 3 VIEWS: ICD-10-PCS | Mod: ,,, | Performed by: ORTHOPAEDIC SURGERY

## 2022-09-15 RX ORDER — METHYLPREDNISOLONE 4 MG/1
TABLET ORAL
Qty: 1 EACH | Refills: 0 | Status: SHIPPED | OUTPATIENT
Start: 2022-09-15 | End: 2023-06-19

## 2022-09-15 NOTE — PROGRESS NOTES
Subjective:    CC: Pain (Right arm numbness, right shoulder sad 12/14/21, pt states has been going on for about 3 months, does have pain in shoulder, pt states having tingling from shoulder down arm especially when resting it on top desk, states he was doing a golf tournament when arm gave out on him, )       HPI:  Patient comes in today complaining of numbness and tingling down his right arm, he states gone on for last couple months.  He states that made it worse after playing golf, 4 days.  He states it is intermittent he denies any motor weakness, he denies other complaints.  He states he did have a massage which make feel better on the base of his shoulder, neck area.    ROS: Refer to HPI for pertinent ROS. All other 12 point systems negative.    Objective:    Physical Exam:  Patient is well-nourished and well-developed, in no apparent distress, pleasant and cooperative. Examination of the right upper extremity compartments are soft and warm.  Skin is intact. There are no signs or symptoms of DVT or infection.   Patient is tender to palpation along the  lateral aspect .  Patient is able to forward flex and abduct to 160.  Mildly positive Andrade and Neers, negative empty can, negative drop arm test. Negative sulcus sign. Stable to stressing. Neurovascularly intact distally.  He is tender along the paraspinal muscles of the right side.  Otherwise he has 5/5 strength from C5-T1.  Sensation intact to light touch.    Images:  X-rays three views right shoulder demonstrate calcific tendinitis, three views of the cervical spine demonstrate no obvious fracture dislocation. Images Reviewed and discussed with patient.    Assessment:  1. Right shoulder pain, unspecified chronicity  - X-ray Shoulder 2 or More Views Right; Future    2. Cervical radiculopathy  - Ambulatory referral/consult to Physical/Occupational Therapy; Future    3. Shoulder impingement, right  - Ambulatory referral/consult to Physical/Occupational  Therapy; Future    4. Calcific tendonitis  - Ambulatory referral/consult to Physical/Occupational Therapy; Future        Plan:  At this time we discussed his physical examination findings.  We have discussed a Medrol Dosepak, we will start formal therapy, I would like see back in 4 weeks to see how he is progressing.  He understands to call or return sooner if there is any change, we have discussed an MRI.    Follow UP: No follow-ups on file.

## 2023-01-09 ENCOUNTER — TELEPHONE (OUTPATIENT)
Dept: ORTHOPEDICS | Facility: CLINIC | Age: 53
End: 2023-01-09
Payer: COMMERCIAL

## 2023-01-09 DIAGNOSIS — M54.12 CERVICAL RADICULOPATHY: Primary | ICD-10-CM

## 2023-01-09 DIAGNOSIS — M65.20 CALCIFIC TENDONITIS: ICD-10-CM

## 2023-01-09 DIAGNOSIS — M25.811 SHOULDER IMPINGEMENT, RIGHT: ICD-10-CM

## 2023-01-09 NOTE — TELEPHONE ENCOUNTER
Patient called stating that you mentioned doing a possible NCS for nerve in C3-4. He stated that for him to get into neuro it is required. Okay to proceed? Last seen 9/15/22

## 2023-01-10 NOTE — TELEPHONE ENCOUNTER
Spoke to patient at [phone]. He verbalized understanding that dr. Quintero will be calling him to set up NCS. Verbalized understanding of follow up appointment.

## 2023-01-10 NOTE — TELEPHONE ENCOUNTER
Attempted to call pt at [phone] with recommendations from dr. Massey.     Referral for ncs sent to dr. Quintero.     Left vm with information that referral was sent and his follow up appt date and time.     Appt: 1/26/23 @ 1:15pm to review results.

## 2023-01-26 ENCOUNTER — OFFICE VISIT (OUTPATIENT)
Dept: ORTHOPEDICS | Facility: CLINIC | Age: 53
End: 2023-01-26
Payer: COMMERCIAL

## 2023-01-26 DIAGNOSIS — M54.12 CERVICAL RADICULOPATHY: Primary | ICD-10-CM

## 2023-01-26 DIAGNOSIS — G56.01 RIGHT CARPAL TUNNEL SYNDROME: ICD-10-CM

## 2023-01-26 PROCEDURE — 20526 PR INJECT CARPAL TUNNEL: ICD-10-PCS | Mod: RT,,, | Performed by: ORTHOPAEDIC SURGERY

## 2023-01-26 PROCEDURE — 20526 THER INJECTION CARP TUNNEL: CPT | Mod: RT,,, | Performed by: ORTHOPAEDIC SURGERY

## 2023-01-26 PROCEDURE — 1159F MED LIST DOCD IN RCRD: CPT | Mod: CPTII,,, | Performed by: ORTHOPAEDIC SURGERY

## 2023-01-26 PROCEDURE — 1160F RVW MEDS BY RX/DR IN RCRD: CPT | Mod: CPTII,,, | Performed by: ORTHOPAEDIC SURGERY

## 2023-01-26 PROCEDURE — 99213 PR OFFICE/OUTPT VISIT, EST, LEVL III, 20-29 MIN: ICD-10-PCS | Mod: 25,,, | Performed by: ORTHOPAEDIC SURGERY

## 2023-01-26 PROCEDURE — 1159F PR MEDICATION LIST DOCUMENTED IN MEDICAL RECORD: ICD-10-PCS | Mod: CPTII,,, | Performed by: ORTHOPAEDIC SURGERY

## 2023-01-26 PROCEDURE — 1160F PR REVIEW ALL MEDS BY PRESCRIBER/CLIN PHARMACIST DOCUMENTED: ICD-10-PCS | Mod: CPTII,,, | Performed by: ORTHOPAEDIC SURGERY

## 2023-01-26 PROCEDURE — 99213 OFFICE O/P EST LOW 20 MIN: CPT | Mod: 25,,, | Performed by: ORTHOPAEDIC SURGERY

## 2023-01-26 RX ORDER — BETAMETHASONE SODIUM PHOSPHATE AND BETAMETHASONE ACETATE 3; 3 MG/ML; MG/ML
6 INJECTION, SUSPENSION INTRA-ARTICULAR; INTRALESIONAL; INTRAMUSCULAR; SOFT TISSUE
Status: DISCONTINUED | OUTPATIENT
Start: 2023-01-26 | End: 2023-01-26 | Stop reason: HOSPADM

## 2023-01-26 RX ORDER — LIDOCAINE HYDROCHLORIDE 20 MG/ML
2 INJECTION, SOLUTION INFILTRATION; PERINEURAL
Status: DISCONTINUED | OUTPATIENT
Start: 2023-01-26 | End: 2023-01-26 | Stop reason: HOSPADM

## 2023-01-26 RX ADMIN — BETAMETHASONE SODIUM PHOSPHATE AND BETAMETHASONE ACETATE 6 MG: 3; 3 INJECTION, SUSPENSION INTRA-ARTICULAR; INTRALESIONAL; INTRAMUSCULAR; SOFT TISSUE at 01:01

## 2023-01-26 RX ADMIN — LIDOCAINE HYDROCHLORIDE 2 ML: 20 INJECTION, SOLUTION INFILTRATION; PERINEURAL at 01:01

## 2023-01-26 NOTE — PROCEDURES
Carpal Tunnel    Date/Time: 1/26/2023 1:15 PM  Performed by: Zurdo Massey MD  Authorized by: Zurdo Massey MD     Consent Done?:  Yes (Verbal)  Indications:  Pain  Site marked: the procedure site was marked    Timeout: prior to procedure the correct patient, procedure, and site was verified    Prep: patient was prepped and draped in usual sterile fashion      Location:  Wrist  Approach:  Volar  Medications:  2 mL LIDOcaine HCL 20 mg/ml (2%) 20 mg/mL (2 %); 6 mg betamethasone acetate-betamethasone sodium phosphate 6 mg/mL  Patient tolerance:  Patient tolerated the procedure well with no immediate complications     Additional Comments: 1.5cc of  2% lidocaine w/o epi & 1.5cc betamethasone.

## 2023-01-26 NOTE — PROGRESS NOTES
Subjective:    CC: Results of the Right Upper Arm and Results (RUE  NCS Results, pt states having muscle spasms all day hasnt slept in awhile from pain,pt asking about the possibilty of a muscle relaxor. )       HPI:  Patient returns today for repeat exam.  Patient continues to have numbness and tingling in his right hand, occasionally shooting up to his shoulder.  Did have a nerve conduction study, he does have a C5 radiculopathy, carpal tunnel moderate on the right    ROS: Refer to HPI for pertinent ROS. All other 12 point systems negative.    Objective:  Vitals:    01/26/23 1337   BP: Comment: unable to obtain        Physical Exam:  Right upper extremity compartment soft and warm.  Skin is intact.  There is no signs symptoms of DVT infection.  Positive Tinel's and Phalen's at the carpal tunnel he is able to make a full fist has full extension, he is neurovascular intact distally.    Images: . Images Reviewed and discussed with patient.    Assessment:  1. Cervical radiculopathy    2. Right carpal tunnel syndrome  - Carpal Tunnel        Plan:  At this time we discussed his physical exam and EMG findings.  We have discussed various treatment options.  He tolerated a steroid injection very well to the carpal tunnel, we have discussed consultation with a spine specialist for his chronic C5 radiculopathy.  I believe this injection will also serve as a diagnostic test whether it is more carpal tunnel versus C5 radiculopathy.  I would like see back in 4 weeks to see how he is progressing.    Follow UP: No follow-ups on file.    Carpal Tunnel    Date/Time: 1/26/2023 1:15 PM  Performed by: Zurdo Massey MD  Authorized by: Zurdo Massey MD     Consent Done?:  Yes (Verbal)  Indications:  Pain  Site marked: the procedure site was marked    Timeout: prior to procedure the correct patient, procedure, and site was verified    Prep: patient was prepped and draped in usual sterile fashion      Location:  Wrist  Approach:   Volar  Medications:  2 mL LIDOcaine HCL 20 mg/ml (2%) 20 mg/mL (2 %); 6 mg betamethasone acetate-betamethasone sodium phosphate 6 mg/mL  Patient tolerance:  Patient tolerated the procedure well with no immediate complications     Additional Comments: 1.5cc of  2% lidocaine w/o epi & 1.5cc betamethasone.

## 2023-02-02 ENCOUNTER — OFFICE VISIT (OUTPATIENT)
Dept: PAIN MEDICINE | Facility: CLINIC | Age: 53
End: 2023-02-02
Payer: COMMERCIAL

## 2023-02-02 VITALS
TEMPERATURE: 98 F | WEIGHT: 187 LBS | SYSTOLIC BLOOD PRESSURE: 131 MMHG | BODY MASS INDEX: 26.77 KG/M2 | HEIGHT: 70 IN | DIASTOLIC BLOOD PRESSURE: 80 MMHG | HEART RATE: 89 BPM

## 2023-02-02 DIAGNOSIS — M54.12 CERVICAL RADICULITIS: Primary | ICD-10-CM

## 2023-02-02 DIAGNOSIS — M50.30 DDD (DEGENERATIVE DISC DISEASE), CERVICAL: ICD-10-CM

## 2023-02-02 DIAGNOSIS — M54.2 CERVICALGIA: ICD-10-CM

## 2023-02-02 PROCEDURE — 1160F PR REVIEW ALL MEDS BY PRESCRIBER/CLIN PHARMACIST DOCUMENTED: ICD-10-PCS | Mod: CPTII,,, | Performed by: ANESTHESIOLOGY

## 2023-02-02 PROCEDURE — 3079F DIAST BP 80-89 MM HG: CPT | Mod: CPTII,,, | Performed by: ANESTHESIOLOGY

## 2023-02-02 PROCEDURE — 99204 OFFICE O/P NEW MOD 45 MIN: CPT | Mod: ,,, | Performed by: ANESTHESIOLOGY

## 2023-02-02 PROCEDURE — 1160F RVW MEDS BY RX/DR IN RCRD: CPT | Mod: CPTII,,, | Performed by: ANESTHESIOLOGY

## 2023-02-02 PROCEDURE — 3075F SYST BP GE 130 - 139MM HG: CPT | Mod: CPTII,,, | Performed by: ANESTHESIOLOGY

## 2023-02-02 PROCEDURE — 3075F PR MOST RECENT SYSTOLIC BLOOD PRESS GE 130-139MM HG: ICD-10-PCS | Mod: CPTII,,, | Performed by: ANESTHESIOLOGY

## 2023-02-02 PROCEDURE — 1159F MED LIST DOCD IN RCRD: CPT | Mod: CPTII,,, | Performed by: ANESTHESIOLOGY

## 2023-02-02 PROCEDURE — 3008F BODY MASS INDEX DOCD: CPT | Mod: CPTII,,, | Performed by: ANESTHESIOLOGY

## 2023-02-02 PROCEDURE — 99204 PR OFFICE/OUTPT VISIT, NEW, LEVL IV, 45-59 MIN: ICD-10-PCS | Mod: ,,, | Performed by: ANESTHESIOLOGY

## 2023-02-02 PROCEDURE — 3079F PR MOST RECENT DIASTOLIC BLOOD PRESSURE 80-89 MM HG: ICD-10-PCS | Mod: CPTII,,, | Performed by: ANESTHESIOLOGY

## 2023-02-02 PROCEDURE — 1159F PR MEDICATION LIST DOCUMENTED IN MEDICAL RECORD: ICD-10-PCS | Mod: CPTII,,, | Performed by: ANESTHESIOLOGY

## 2023-02-02 PROCEDURE — 3008F PR BODY MASS INDEX (BMI) DOCUMENTED: ICD-10-PCS | Mod: CPTII,,, | Performed by: ANESTHESIOLOGY

## 2023-02-02 RX ORDER — CYCLOBENZAPRINE HCL 10 MG
10 TABLET ORAL 2 TIMES DAILY PRN
COMMUNITY
Start: 2023-01-21 | End: 2023-06-19

## 2023-02-02 RX ORDER — DICLOFENAC SODIUM 75 MG/1
75 TABLET, DELAYED RELEASE ORAL 2 TIMES DAILY
COMMUNITY
Start: 2023-01-14 | End: 2023-06-20

## 2023-02-02 NOTE — PROGRESS NOTES
Lamar Villanueva MD        PATIENT NAME: Allen Esparza  : 1970  DATE: 23  MRN: [id number]      Billing Provider: Lamar Villanueva MD  Level of Service:   Patient PCP Information       Provider PCP Type    Yan Mahajan MD General            Reason for Visit / Chief Complaint: Neck Pain (Referred by Dr Massey, cervical pain since 2022, attended P.T.,no prior injections, had a shoulder surgery in 2021, entire right arm gets numb and tingles, pt denies pain.)       Update PCP  Update Chief Complaint         History of Present Illness / Problem Focused Workflow     Allen Esparza presents to the clinic with Neck Pain (Referred by Dr Massey, cervical pain since 2022, attended P.T.,no prior injections, had a shoulder surgery in 2021, entire right arm gets numb and tingles, pt denies pain.)     This is a 52-year-old male who presents to clinic today for his initial consultation as a referral from Dr. Massey.  He complains of pain radiating from his neck down his right upper extremity to the 1st and 2nd digits of his right hand.  His pain started in 2022 after he had been golfing at a 4 day term it.  He states that his right arm went numb down to his hand.  He initially thought it was coming from his shoulder, as he had undergone right shoulder surgery in 2021.  He tried massage therapy and physical therapy, but his pain did not improve.  He denies any symptoms radiating into the left upper extremity.  He has difficulty sleeping at night because of the pain.  He takes a muscle relaxer, which does help him to sleep, but he can not take it during the day.  Tylenol and ice packs help a little bit.  Norco did not help.  He has not undergone any previous surgery or injections on his cervical spine.  He does have a history of L5-S1 fusion and underwent some injections in his lower back in the past.      Neck Pain   Associated symptoms include numbness.     Review of  Systems     Review of Systems   Musculoskeletal:  Positive for neck pain and neck stiffness.   Neurological:  Positive for numbness.   Psychiatric/Behavioral:  Positive for sleep disturbance.    All other systems reviewed and are negative.     Medical / Social / Family History   History reviewed. No pertinent past medical history.    Past Surgical History:   Procedure Laterality Date    BACK SURGERY      ELBOW SURGERY      SHOULDER SURGERY         Social History  Mr. Esparza  reports that he has never smoked. He has never used smokeless tobacco. He reports current alcohol use. He reports that he does not use drugs.    Family History  Mr.'s Esparza Family history is unknown by patient.    Medications and Allergies     Medications  Outpatient Medications Marked as Taking for the 2/2/23 encounter (Office Visit) with Lamar Villanueva MD   Medication Sig Dispense Refill    atomoxetine (STRATTERA) 25 MG capsule Take 25 mg by mouth 2 (two) times daily.      cyclobenzaprine (FLEXERIL) 10 MG tablet Take 10 mg by mouth 2 (two) times daily as needed.      diclofenac (VOLTAREN) 75 MG EC tablet Take 75 mg by mouth 2 (two) times daily.      tadalafiL (CIALIS) 5 MG tablet Take 5 mg by mouth once daily.         Allergies  Review of patient's allergies indicates:  No Known Allergies    Physical Examination     Vitals:    02/02/23 1436   BP: 131/80   Pulse: 89   Temp: 98.4 °F (36.9 °C)     Spine Musculoskeletal Exam    Gait    Gait is normal.    Inspection    Cervical Spine    Cervical spine inspection is normal.    Palpation    Cervical Spine    Tenderness: none    Right      Masses: none      Spasms: none      Crepitus: none      Muscle tone: normal    Left      Masses: none      Spasms: none      Crepitus: none      Muscle tone: normal    Range of Motion    Cervical Spine       Cervical flexion: 1-2 finger breadths.     Cervical extension: reduced extension (76-90%). Cervical extension detail: pain.       Right      Lateral  bending: reduced bend (76-90%). Lateral bending detail: pain.       Left      Lateral bending: normal.      Strength    Cervical Spine    Cervical spine motor exam is normal.    Sensory    Cervical Spine    Cervical spine sensation is normal.    General      Constitutional: appears stated age, well-developed and well-nourished    Scleral icterus: no    Labored breathing: no    Psychiatric: normal mood and affect and no acute distress    Neurological: alert and oriented x3    Skin: intact    Lymphadenopathy: none     Assessment and Plan (including Health Maintenance)      Problem List  Smart Sets  Document Outside HM   :    Plan:   Cervical radiculitis  -     MRI Cervical Spine Without Contrast; Future; Expected date: 02/02/2023    Cervicalgia  -     MRI Cervical Spine Without Contrast; Future; Expected date: 02/02/2023    DDD (degenerative disc disease), cervical       Complains of pain radiating from his neck down the right upper extremity to the 1st and 2nd digits of his right hand.  He had an EMG which showed a C5 radiculopathy.  His x-rays have shown degenerative changes in the cervical spine as well.  An MRI of the cervical spine without contrast is indicated to further evaluate his symptoms.  I discussed that there may be injections that can be done to help alleviate his pain, but I will need to review his imaging 1st.  He will come back to discuss the results.    Problem List Items Addressed This Visit       Cervical radiculitis - Primary    Relevant Orders    MRI Cervical Spine Without Contrast    Cervicalgia    Relevant Orders    MRI Cervical Spine Without Contrast    DDD (degenerative disc disease), cervical         Future Appointments   Date Time Provider Department Center   2/16/2023  2:45 PM Lamar Villanueva MD [facility]   2/23/2023  2:15 PM Zurdo Massey MD [facility]        There are no Patient Instructions on file for this visit.  No follow-ups on file.     Signature:   Lamar Villanueva MD      Date of encounter: 2/2/23

## 2023-02-13 ENCOUNTER — OFFICE VISIT (OUTPATIENT)
Dept: PAIN MEDICINE | Facility: CLINIC | Age: 53
End: 2023-02-13
Payer: COMMERCIAL

## 2023-02-13 VITALS
HEIGHT: 70 IN | SYSTOLIC BLOOD PRESSURE: 120 MMHG | TEMPERATURE: 99 F | WEIGHT: 187 LBS | HEART RATE: 85 BPM | DIASTOLIC BLOOD PRESSURE: 79 MMHG | BODY MASS INDEX: 26.77 KG/M2

## 2023-02-13 DIAGNOSIS — M54.12 CERVICAL RADICULITIS: Primary | ICD-10-CM

## 2023-02-13 DIAGNOSIS — M50.30 DDD (DEGENERATIVE DISC DISEASE), CERVICAL: ICD-10-CM

## 2023-02-13 DIAGNOSIS — M54.2 CERVICALGIA: ICD-10-CM

## 2023-02-13 PROCEDURE — 3008F BODY MASS INDEX DOCD: CPT | Mod: CPTII,,, | Performed by: ANESTHESIOLOGY

## 2023-02-13 PROCEDURE — 1159F MED LIST DOCD IN RCRD: CPT | Mod: CPTII,,, | Performed by: ANESTHESIOLOGY

## 2023-02-13 PROCEDURE — 3074F SYST BP LT 130 MM HG: CPT | Mod: CPTII,,, | Performed by: ANESTHESIOLOGY

## 2023-02-13 PROCEDURE — 3078F PR MOST RECENT DIASTOLIC BLOOD PRESSURE < 80 MM HG: ICD-10-PCS | Mod: CPTII,,, | Performed by: ANESTHESIOLOGY

## 2023-02-13 PROCEDURE — 1159F PR MEDICATION LIST DOCUMENTED IN MEDICAL RECORD: ICD-10-PCS | Mod: CPTII,,, | Performed by: ANESTHESIOLOGY

## 2023-02-13 PROCEDURE — 3008F PR BODY MASS INDEX (BMI) DOCUMENTED: ICD-10-PCS | Mod: CPTII,,, | Performed by: ANESTHESIOLOGY

## 2023-02-13 PROCEDURE — 99214 OFFICE O/P EST MOD 30 MIN: CPT | Mod: ,,, | Performed by: ANESTHESIOLOGY

## 2023-02-13 PROCEDURE — 3074F PR MOST RECENT SYSTOLIC BLOOD PRESSURE < 130 MM HG: ICD-10-PCS | Mod: CPTII,,, | Performed by: ANESTHESIOLOGY

## 2023-02-13 PROCEDURE — 3078F DIAST BP <80 MM HG: CPT | Mod: CPTII,,, | Performed by: ANESTHESIOLOGY

## 2023-02-13 PROCEDURE — 99214 PR OFFICE/OUTPT VISIT, EST, LEVL IV, 30-39 MIN: ICD-10-PCS | Mod: ,,, | Performed by: ANESTHESIOLOGY

## 2023-02-13 NOTE — H&P (VIEW-ONLY)
Lamar Villanueva MD        PATIENT NAME: Allen Esparza  : 1970  DATE: 23  MRN: [id number]      Billing Provider: Lamar Villanueva MD  Level of Service:   Patient PCP Information       Provider PCP Type    Yan Mahajan MD General            Reason for Visit / Chief Complaint: Results (Pt is in office today for c-spine MRI results, pt states nothing has changed since his last appointment, pain level 3/10)       Update PCP  Update Chief Complaint         History of Present Illness / Problem Focused Workflow     Allen Esparza presents to the clinic with Results (Pt is in office today for c-spine MRI results, pt states nothing has changed since his last appointment, pain level 3/10)     This is a 52-year-old male who presents to clinic today for follow up of pain radiating from his neck down his right upper extremity to the 1st and 2nd digits of his right hand.  His pain started in 2022 after he had been golfing at a 4 day tournament.  He states that his right arm went numb down to his hand.  He initially thought it was coming from his shoulder, as he had undergone right shoulder surgery in 2021.  He tried massage therapy and physical therapy, but his pain did not improve.  He denies any symptoms radiating into the left upper extremity.  He has difficulty sleeping at night because of the pain.  He takes a muscle relaxer, which does help him to sleep, but he can not take it during the day.  Tylenol and ice packs help a little bit.  Norco did not help.  He has not undergone any previous surgery or injections on his cervical spine.  He does have a history of L5-S1 fusion and underwent some injections in his lower back in the past.  He is here today to discuss the results of his MRI that I had ordered at his initial appointment a couple of weeks ago.      Neck Pain   Associated symptoms include numbness.     Review of Systems     Review of Systems   Musculoskeletal:  Positive for neck  pain and neck stiffness.   Neurological:  Positive for numbness.   Psychiatric/Behavioral:  Positive for sleep disturbance.    All other systems reviewed and are negative.     Medical / Social / Family History   History reviewed. No pertinent past medical history.    Past Surgical History:   Procedure Laterality Date    BACK SURGERY      ELBOW SURGERY      SHOULDER SURGERY         Social History  Mr. Esparza  reports that he has never smoked. He has never used smokeless tobacco. He reports current alcohol use. He reports that he does not use drugs.    Family History  Mr.'s Esparza Family history is unknown by patient.    Medications and Allergies     Medications  Outpatient Medications Marked as Taking for the 2/13/23 encounter (Office Visit) with Lamar Villanueva MD   Medication Sig Dispense Refill    atomoxetine (STRATTERA) 25 MG capsule Take 25 mg by mouth 2 (two) times daily.      cyclobenzaprine (FLEXERIL) 10 MG tablet Take 10 mg by mouth 2 (two) times daily as needed.      diclofenac (VOLTAREN) 75 MG EC tablet Take 75 mg by mouth 2 (two) times daily.      methylPREDNISolone (MEDROL DOSEPACK) 4 mg tablet use as directed 1 each 0    tadalafiL (CIALIS) 5 MG tablet Take 5 mg by mouth once daily.         Allergies  Review of patient's allergies indicates:  No Known Allergies    Physical Examination     Vitals:    02/13/23 1439   BP: 120/79   Pulse: 85   Temp: 98.6 °F (37 °C)     Spine Musculoskeletal Exam    Gait    Gait is normal.    Inspection    Cervical Spine    Cervical spine inspection is normal.    Palpation    Cervical Spine    Tenderness: none    Right      Masses: none      Spasms: none      Crepitus: none      Muscle tone: normal    Left      Masses: none      Spasms: none      Crepitus: none      Muscle tone: normal    Range of Motion    Cervical Spine       Cervical flexion: 1-2 finger breadths.     Cervical extension: reduced extension (76-90%). Cervical extension detail: pain.       Right       Lateral bending: reduced bend (76-90%). Lateral bending detail: pain.       Left      Lateral bending: normal.      Strength    Cervical Spine    Cervical spine motor exam is normal.    Sensory    Cervical Spine    Cervical spine sensation is normal.    General      Constitutional: appears stated age, well-developed and well-nourished    Scleral icterus: no    Labored breathing: no    Psychiatric: normal mood and affect and no acute distress    Neurological: alert and oriented x3    Skin: intact    Lymphadenopathy: none     Assessment and Plan (including Health Maintenance)      Problem List  Smart Sets  Document Outside HM   :    Plan:   Cervical radiculitis    DDD (degenerative disc disease), cervical    Cervicalgia     Complains of pain radiating from his neck down the right upper extremity to the 1st and 2nd digits of his right hand.  He had an EMG which showed a C5 radiculopathy.  His x-rays have shown degenerative changes in the cervical spine as well.  MRI of the cervical spine without contrast revealed disc degeneration and spondylosis, with foraminal stenosis on the right, consistent with his symptoms.  I am scheduling him for a cervical epidural steroid injection (C7-T1).  Plan was discussed with the patient and he wishes to proceed.    Problem List Items Addressed This Visit       Cervical radiculitis - Primary    Cervicalgia    DDD (degenerative disc disease), cervical         Future Appointments   Date Time Provider Department Center   2/23/2023  2:15 PM [facility]        There are no Patient Instructions on file for this visit.  No follow-ups on file.     Signature:  Lamar Villanueva MD      Date of encounter: 2/13/23

## 2023-02-13 NOTE — PROGRESS NOTES
Lamar Villanueva MD        PATIENT NAME: Allen Esparza  : 1970  DATE: 23  MRN: [id number]      Billing Provider: Lamar Villanueva MD  Level of Service:   Patient PCP Information       Provider PCP Type    Yan Mahajan MD General            Reason for Visit / Chief Complaint: Results (Pt is in office today for c-spine MRI results, pt states nothing has changed since his last appointment, pain level 3/10)       Update PCP  Update Chief Complaint         History of Present Illness / Problem Focused Workflow     Allen Esparza presents to the clinic with Results (Pt is in office today for c-spine MRI results, pt states nothing has changed since his last appointment, pain level 3/10)     This is a 52-year-old male who presents to clinic today for follow up of pain radiating from his neck down his right upper extremity to the 1st and 2nd digits of his right hand.  His pain started in 2022 after he had been golfing at a 4 day tournament.  He states that his right arm went numb down to his hand.  He initially thought it was coming from his shoulder, as he had undergone right shoulder surgery in 2021.  He tried massage therapy and physical therapy, but his pain did not improve.  He denies any symptoms radiating into the left upper extremity.  He has difficulty sleeping at night because of the pain.  He takes a muscle relaxer, which does help him to sleep, but he can not take it during the day.  Tylenol and ice packs help a little bit.  Norco did not help.  He has not undergone any previous surgery or injections on his cervical spine.  He does have a history of L5-S1 fusion and underwent some injections in his lower back in the past.  He is here today to discuss the results of his MRI that I had ordered at his initial appointment a couple of weeks ago.      Neck Pain   Associated symptoms include numbness.     Review of Systems     Review of Systems   Musculoskeletal:  Positive for neck  pain and neck stiffness.   Neurological:  Positive for numbness.   Psychiatric/Behavioral:  Positive for sleep disturbance.    All other systems reviewed and are negative.     Medical / Social / Family History   History reviewed. No pertinent past medical history.    Past Surgical History:   Procedure Laterality Date    BACK SURGERY      ELBOW SURGERY      SHOULDER SURGERY         Social History  Mr. Esparza  reports that he has never smoked. He has never used smokeless tobacco. He reports current alcohol use. He reports that he does not use drugs.    Family History  Mr.'s Esparza Family history is unknown by patient.    Medications and Allergies     Medications  Outpatient Medications Marked as Taking for the 2/13/23 encounter (Office Visit) with Lamar Villanueva MD   Medication Sig Dispense Refill    atomoxetine (STRATTERA) 25 MG capsule Take 25 mg by mouth 2 (two) times daily.      cyclobenzaprine (FLEXERIL) 10 MG tablet Take 10 mg by mouth 2 (two) times daily as needed.      diclofenac (VOLTAREN) 75 MG EC tablet Take 75 mg by mouth 2 (two) times daily.      methylPREDNISolone (MEDROL DOSEPACK) 4 mg tablet use as directed 1 each 0    tadalafiL (CIALIS) 5 MG tablet Take 5 mg by mouth once daily.         Allergies  Review of patient's allergies indicates:  No Known Allergies    Physical Examination     Vitals:    02/13/23 1439   BP: 120/79   Pulse: 85   Temp: 98.6 °F (37 °C)     Spine Musculoskeletal Exam    Gait    Gait is normal.    Inspection    Cervical Spine    Cervical spine inspection is normal.    Palpation    Cervical Spine    Tenderness: none    Right      Masses: none      Spasms: none      Crepitus: none      Muscle tone: normal    Left      Masses: none      Spasms: none      Crepitus: none      Muscle tone: normal    Range of Motion    Cervical Spine       Cervical flexion: 1-2 finger breadths.     Cervical extension: reduced extension (76-90%). Cervical extension detail: pain.       Right       Lateral bending: reduced bend (76-90%). Lateral bending detail: pain.       Left      Lateral bending: normal.      Strength    Cervical Spine    Cervical spine motor exam is normal.    Sensory    Cervical Spine    Cervical spine sensation is normal.    General      Constitutional: appears stated age, well-developed and well-nourished    Scleral icterus: no    Labored breathing: no    Psychiatric: normal mood and affect and no acute distress    Neurological: alert and oriented x3    Skin: intact    Lymphadenopathy: none     Assessment and Plan (including Health Maintenance)      Problem List  Smart Sets  Document Outside HM   :    Plan:   Cervical radiculitis    DDD (degenerative disc disease), cervical    Cervicalgia     Complains of pain radiating from his neck down the right upper extremity to the 1st and 2nd digits of his right hand.  He had an EMG which showed a C5 radiculopathy.  His x-rays have shown degenerative changes in the cervical spine as well.  MRI of the cervical spine without contrast revealed disc degeneration and spondylosis, with foraminal stenosis on the right, consistent with his symptoms.  I am scheduling him for a cervical epidural steroid injection (C7-T1).  Plan was discussed with the patient and he wishes to proceed.    Problem List Items Addressed This Visit       Cervical radiculitis - Primary    Cervicalgia    DDD (degenerative disc disease), cervical         Future Appointments   Date Time Provider Department Center   2/23/2023  2:15 PM [facility]        There are no Patient Instructions on file for this visit.  No follow-ups on file.     Signature:  Lamar Villanueva MD      Date of encounter: 2/13/23

## 2023-02-20 ENCOUNTER — PATIENT MESSAGE (OUTPATIENT)
Dept: ADMINISTRATIVE | Facility: OTHER | Age: 53
End: 2023-02-20
Payer: COMMERCIAL

## 2023-02-21 ENCOUNTER — PATIENT MESSAGE (OUTPATIENT)
Dept: ADMINISTRATIVE | Facility: OTHER | Age: 53
End: 2023-02-21
Payer: COMMERCIAL

## 2023-02-21 NOTE — DISCHARGE INSTRUCTIONS
EPIDURAL STEROID INJECTION, CARE AFTER                                                                                    NO HEAVY LIFTING FOR ONE WEEK                                                                                  NO HEAT FOR 24 HOURS                                                                                  APPLY ICE PACK FOR COMFORT TO SITES                                                                                  REMOVE BAND-AIDS TOMORROW AND THEN YOU MAY SHOWER                                                                                  NO TUB SOAKING FOR 3-5 DAYS      These instructions give you information on caring for yourself after your procedure. Your doctor may also give you specific instructions. Call your doctor if you have any problems or questions after your procedure.     HOME CARE  Do not drive or use any machinery for the next 24 hours.  Do not do any hard physical activity for the next 24 hours  Do not use a heating pad in area of injection  You may go back to eating as usual    SIDE EFFECTS THAT COULD HAPPEN UP TO 4 HOURS AFTER THE INJECTION  If you have leg weakness or numbness, have someone help you walk. If the weakness or numbness does not go away, or if it gets worse go to the emergency department.  If you have dizziness, lie down right away. This usually helps. Sit up slowly and then when you have been sitting for a few minutes then stand up.  If you have a mild headache, drink fluids (especially drinks with caffeine) Call your doctor if the headache gets worse or persists.  When the numbing medicine wears off you may feel some discomfort where you had the shot. It usually only lasts for a few days. You may put ice over the injection site. Leave ice on for 20 minutes at a time and protect your skin during use.   You may feel minor back pain and stiffness at the site of the shot. Call your doctor if this pain gets worse or does not improve. You may feel  nauseous or vomit several hours after your procedure. If this happens, try drinking small amounts of clear liquids until you feel better. If you continue to feel nauseated or continue vomiting, get help right away.    Steroids may take several days to start working. The shot usually helps leg pain more than back pain. The shot will not fix what is causing the pain but may take away some of the pain. This pain relief may last from 2 weeks to 6 months.     GET HELP RIGHT AWAY IF:  You have very bad pain, headache or neck pain or stiffness  There is a change in your vision (double or blurry vision)  You have a fever over 101 or chills  You have swelling or redness at the injection site  You get weaker  You are not able to control your bladder or bowels  You are not able to urinate

## 2023-02-23 ENCOUNTER — ANESTHESIA EVENT (OUTPATIENT)
Dept: SURGERY | Facility: HOSPITAL | Age: 53
End: 2023-02-23
Payer: COMMERCIAL

## 2023-02-23 ENCOUNTER — ANESTHESIA (OUTPATIENT)
Dept: SURGERY | Facility: HOSPITAL | Age: 53
End: 2023-02-23
Payer: COMMERCIAL

## 2023-02-23 ENCOUNTER — HOSPITAL ENCOUNTER (OUTPATIENT)
Facility: HOSPITAL | Age: 53
Discharge: HOME | End: 2023-02-23
Attending: ANESTHESIOLOGY | Admitting: ANESTHESIOLOGY
Payer: COMMERCIAL

## 2023-02-23 DIAGNOSIS — M54.2 CHRONIC NECK PAIN: ICD-10-CM

## 2023-02-23 DIAGNOSIS — G89.29 CHRONIC NECK PAIN: ICD-10-CM

## 2023-02-23 PROCEDURE — 62323 PR INJ LUMBAR/SACRAL, W/IMAGING GUIDANCE: ICD-10-PCS | Mod: ,,, | Performed by: ANESTHESIOLOGY

## 2023-02-23 PROCEDURE — 62323 NJX INTERLAMINAR LMBR/SAC: CPT | Mod: ,,, | Performed by: ANESTHESIOLOGY

## 2023-02-23 PROCEDURE — 37000008 HC ANESTHESIA 1ST 15 MINUTES: Performed by: ANESTHESIOLOGY

## 2023-02-23 PROCEDURE — 63600175 PHARM REV CODE 636 W HCPCS: Performed by: ANESTHESIOLOGY

## 2023-02-23 PROCEDURE — 62323 NJX INTERLAMINAR LMBR/SAC: CPT | Performed by: ANESTHESIOLOGY

## 2023-02-23 PROCEDURE — 25000003 PHARM REV CODE 250: Performed by: ANESTHESIOLOGY

## 2023-02-23 PROCEDURE — 25000003 PHARM REV CODE 250: Performed by: NURSE ANESTHETIST, CERTIFIED REGISTERED

## 2023-02-23 PROCEDURE — A4216 STERILE WATER/SALINE, 10 ML: HCPCS | Performed by: ANESTHESIOLOGY

## 2023-02-23 PROCEDURE — 63600175 PHARM REV CODE 636 W HCPCS: Performed by: NURSE ANESTHETIST, CERTIFIED REGISTERED

## 2023-02-23 RX ORDER — PROCHLORPERAZINE EDISYLATE 5 MG/ML
5 INJECTION INTRAMUSCULAR; INTRAVENOUS EVERY 30 MIN PRN
Status: CANCELLED | OUTPATIENT
Start: 2023-02-23

## 2023-02-23 RX ORDER — LIDOCAINE HYDROCHLORIDE 10 MG/ML
INJECTION, SOLUTION EPIDURAL; INFILTRATION; INTRACAUDAL; PERINEURAL
Status: DISCONTINUED | OUTPATIENT
Start: 2023-02-23 | End: 2023-02-23 | Stop reason: HOSPADM

## 2023-02-23 RX ORDER — DEXAMETHASONE SODIUM PHOSPHATE 10 MG/ML
INJECTION INTRAMUSCULAR; INTRAVENOUS
Status: DISCONTINUED | OUTPATIENT
Start: 2023-02-23 | End: 2023-02-23 | Stop reason: HOSPADM

## 2023-02-23 RX ORDER — SODIUM CHLORIDE, SODIUM GLUCONATE, SODIUM ACETATE, POTASSIUM CHLORIDE AND MAGNESIUM CHLORIDE 30; 37; 368; 526; 502 MG/100ML; MG/100ML; MG/100ML; MG/100ML; MG/100ML
INJECTION, SOLUTION INTRAVENOUS CONTINUOUS
Status: CANCELLED | OUTPATIENT
Start: 2023-02-23 | End: 2023-03-25

## 2023-02-23 RX ORDER — DIPHENHYDRAMINE HYDROCHLORIDE 50 MG/ML
25 INJECTION INTRAMUSCULAR; INTRAVENOUS EVERY 6 HOURS PRN
Status: CANCELLED | OUTPATIENT
Start: 2023-02-23

## 2023-02-23 RX ORDER — LIDOCAINE HYDROCHLORIDE 10 MG/ML
INJECTION, SOLUTION EPIDURAL; INFILTRATION; INTRACAUDAL; PERINEURAL
Status: DISCONTINUED
Start: 2023-02-23 | End: 2023-02-23 | Stop reason: HOSPADM

## 2023-02-23 RX ORDER — LIDOCAINE HYDROCHLORIDE 10 MG/ML
1 INJECTION, SOLUTION EPIDURAL; INFILTRATION; INTRACAUDAL; PERINEURAL ONCE
Status: CANCELLED | OUTPATIENT
Start: 2023-02-23 | End: 2023-02-23

## 2023-02-23 RX ORDER — LIDOCAINE HYDROCHLORIDE 10 MG/ML
INJECTION, SOLUTION EPIDURAL; INFILTRATION; INTRACAUDAL; PERINEURAL
Status: DISCONTINUED | OUTPATIENT
Start: 2023-02-23 | End: 2023-02-23

## 2023-02-23 RX ORDER — ONDANSETRON 2 MG/ML
INJECTION INTRAMUSCULAR; INTRAVENOUS
Status: DISCONTINUED | OUTPATIENT
Start: 2023-02-23 | End: 2023-02-23

## 2023-02-23 RX ORDER — DEXAMETHASONE SODIUM PHOSPHATE 10 MG/ML
INJECTION INTRAMUSCULAR; INTRAVENOUS
Status: DISCONTINUED
Start: 2023-02-23 | End: 2023-02-23 | Stop reason: HOSPADM

## 2023-02-23 RX ORDER — SODIUM CHLORIDE 9 MG/ML
INJECTION, SOLUTION INTRAMUSCULAR; INTRAVENOUS; SUBCUTANEOUS
Status: DISCONTINUED | OUTPATIENT
Start: 2023-02-23 | End: 2023-02-23 | Stop reason: HOSPADM

## 2023-02-23 RX ORDER — SODIUM CHLORIDE 9 MG/ML
INJECTION, SOLUTION INTRAMUSCULAR; INTRAVENOUS; SUBCUTANEOUS
Status: DISCONTINUED
Start: 2023-02-23 | End: 2023-02-23 | Stop reason: HOSPADM

## 2023-02-23 RX ORDER — PROPOFOL 10 MG/ML
VIAL (ML) INTRAVENOUS
Status: DISCONTINUED | OUTPATIENT
Start: 2023-02-23 | End: 2023-02-23

## 2023-02-23 RX ORDER — ONDANSETRON 2 MG/ML
4 INJECTION INTRAMUSCULAR; INTRAVENOUS DAILY PRN
Status: CANCELLED | OUTPATIENT
Start: 2023-02-23

## 2023-02-23 RX ADMIN — SODIUM CHLORIDE, POTASSIUM CHLORIDE, SODIUM LACTATE AND CALCIUM CHLORIDE: 600; 310; 30; 20 INJECTION, SOLUTION INTRAVENOUS at 10:02

## 2023-02-23 RX ADMIN — LIDOCAINE HYDROCHLORIDE 100 MG: 10 INJECTION, SOLUTION EPIDURAL; INFILTRATION; INTRACAUDAL; PERINEURAL at 10:02

## 2023-02-23 RX ADMIN — PROPOFOL 120 MG: 10 INJECTION, EMULSION INTRAVENOUS at 10:02

## 2023-02-23 RX ADMIN — ONDANSETRON 4 MG: 2 INJECTION INTRAMUSCULAR; INTRAVENOUS at 11:02

## 2023-02-23 NOTE — ANESTHESIA POSTPROCEDURE EVALUATION
Anesthesia Post Evaluation    Patient: Allen Esparza    Procedure(s) Performed: Procedure(s) (LRB):  INJECTION, STEROID, SPINE, CERVICAL, EPIDURAL C7-T1 (N/A)    Final Anesthesia Type: general      Patient location during evaluation: PACU  Patient participation: Yes- Able to Participate  Level of consciousness: awake and alert and oriented  Post-procedure vital signs: reviewed and stable  Pain management: adequate  Airway patency: patent    PONV status at discharge: No PONV  Anesthetic complications: no      Cardiovascular status: hemodynamically stable  Respiratory status: unassisted  Hydration status: euvolemic  Follow-up not needed.          Vitals Value Taken Time   /84 02/23/23 1125   Temp 36.6 °C (97.9 °F) 02/23/23 1125   Pulse 62 02/23/23 1125   Resp 16 02/23/23 1125   SpO2 97 % 02/23/23 1110         No case tracking events are documented in the log.      Pain/Quirino Score: Quirino Score: 10 (2/23/2023 11:29 AM)

## 2023-02-23 NOTE — DISCHARGE SUMMARY
[facility] Surgical - Periop Services  Discharge Note  Short Stay    Procedure(s) (LRB):  INJECTION, STEROID, SPINE, CERVICAL, EPIDURAL C7-T1 (N/A)      OUTCOME: Patient tolerated treatment/procedure well without complication and is now ready for discharge.    DISPOSITION: Home or Self Care    FINAL DIAGNOSIS:  <principal problem not specified>    FOLLOWUP: In clinic    DISCHARGE INSTRUCTIONS:  No discharge procedures on file.     TIME SPENT ON DISCHARGE: 5 minutes

## 2023-02-23 NOTE — ANESTHESIA PREPROCEDURE EVALUATION
02/23/2023  Allen Esparza is a 52 y.o., male.    Allen Esparza    Pre-op Diagnosis: Cervical radiculitis [M54.12]  DDD (degenerative disc disease), cervical [M50.30]  Cervicalgia [M54.2]    Procedure(s): INJECTION, STEROID, SPINE, CERVICAL, EPIDURAL C7-T1     Review of patient's allergies indicates:  No Known Allergies    Current Outpatient Medications   Medication Instructions    atomoxetine (STRATTERA) 25 mg, Oral, 2 times daily    cyclobenzaprine (FLEXERIL) 10 mg, Oral, 2 times daily PRN    diclofenac (VOLTAREN) 75 mg, Oral, 2 times daily    methylPREDNISolone (MEDROL DOSEPACK) 4 mg tablet use as directed    methylPREDNISolone (MEDROL DOSEPACK) 4 mg tablet use as directed    tadalafiL (CIALIS) 5 mg, Oral, Daily     [place] INJ CERV/THORAC, W/GUIDANCE [11197] (INJECTION, STEROID,*    Past Medical History:   Diagnosis Date    Cervical radiculitis        Past Surgical History:   Procedure Laterality Date    BACK SURGERY      lumbar Fusion    ELBOW SURGERY Left     EPIDURAL STEROID INJECTION INTO CERVICAL SPINE      SHOULDER SURGERY Right            Pre-op Assessment    I have reviewed the Patient Summary Reports.    I have reviewed the NPO Status.   I have reviewed the Medications.     Review of Systems  Anesthesia Hx:  PONV c general anesthesia Denies Family Hx of Anesthesia complications.  Personal Hx of Anesthesia complications, Post-Operative Nausea/Vomiting   Social:  Non-Smoker    Cardiovascular:   Exercise tolerance: good  Denies Angina.  Denies Orthopnea.  Denies PND.  Denies WAYNE.  Functional Capacity good / => 4 METS    Musculoskeletal:   Arthritis     Neurological:   Denies TIA. Denies CVA.    Psych:   Psychiatric History (adhd)          Physical Exam  General: Well nourished, Alert and Oriented    Airway:  Mallampati: III   Mouth Opening: Normal  TM Distance: Normal  Tongue: Normal  Neck  ROM: Normal ROM    Dental:  Intact    Chest/Lungs:  Clear to auscultation    Heart:  Rate: Normal  Rhythm: Regular Rhythm  No pretibial edema  No carotid bruits      Anesthesia Plan  Type of Anesthesia, risks & benefits discussed:    Anesthesia Type: Gen Natural Airway  Intra-op Monitoring Plan: Standard ASA Monitors  Post Op Pain Control Plan: IV/PO Opioids PRN  Induction:  IV  Informed Consent: Informed consent signed with the Patient and all parties understand the risks and agree with anesthesia plan.  All questions answered. Patient consented to blood products? No  ASA Score: 2  Day of Surgery Review of History & Physical: H&P Update referred to the surgeon/provider.  Anesthesia Plan Notes: GA TIVA    Ready For Surgery From Anesthesia Perspective.     .

## 2023-02-23 NOTE — TRANSFER OF CARE
"Anesthesia Transfer of Care Note    Patient: Allen Esparza    Procedure(s) Performed: Procedure(s) (LRB):  INJECTION, STEROID, SPINE, CERVICAL, EPIDURAL C7-T1 (N/A)    Patient location: OPS    Anesthesia Type: general and MAC    Transport from OR: Transported from OR on room air with adequate spontaneous ventilation    Post pain: adequate analgesia    Post assessment: no apparent anesthetic complications    Post vital signs: stable    Level of consciousness: responds to stimulation    Nausea/Vomiting: no nausea/vomiting    Complications: none    Transfer of care protocol was followed      Last vitals:   Visit Vitals  BP (!) 142/85   Pulse 69   Temp 36.7 °C (98 °F) (Oral)   Resp 18   Ht 5' 10" (1.778 m)   Wt 84.4 kg (186 lb)   SpO2 98%   BMI 26.69 kg/m²     "

## 2023-02-24 VITALS
WEIGHT: 186 LBS | HEIGHT: 70 IN | OXYGEN SATURATION: 97 % | HEART RATE: 62 BPM | BODY MASS INDEX: 26.63 KG/M2 | DIASTOLIC BLOOD PRESSURE: 84 MMHG | RESPIRATION RATE: 16 BRPM | SYSTOLIC BLOOD PRESSURE: 125 MMHG | TEMPERATURE: 98 F

## 2023-03-06 ENCOUNTER — OFFICE VISIT (OUTPATIENT)
Dept: ORTHOPEDICS | Facility: CLINIC | Age: 53
End: 2023-03-06
Payer: COMMERCIAL

## 2023-03-06 VITALS
SYSTOLIC BLOOD PRESSURE: 135 MMHG | BODY MASS INDEX: 27.25 KG/M2 | WEIGHT: 190.38 LBS | HEIGHT: 70 IN | HEART RATE: 91 BPM | DIASTOLIC BLOOD PRESSURE: 77 MMHG

## 2023-03-06 DIAGNOSIS — M54.12 CERVICAL RADICULOPATHY: Primary | ICD-10-CM

## 2023-03-06 DIAGNOSIS — G56.01 RIGHT CARPAL TUNNEL SYNDROME: ICD-10-CM

## 2023-03-06 PROCEDURE — 99213 PR OFFICE/OUTPT VISIT, EST, LEVL III, 20-29 MIN: ICD-10-PCS | Mod: ,,, | Performed by: ORTHOPAEDIC SURGERY

## 2023-03-06 PROCEDURE — 3075F SYST BP GE 130 - 139MM HG: CPT | Mod: CPTII,,, | Performed by: ORTHOPAEDIC SURGERY

## 2023-03-06 PROCEDURE — 3078F DIAST BP <80 MM HG: CPT | Mod: CPTII,,, | Performed by: ORTHOPAEDIC SURGERY

## 2023-03-06 PROCEDURE — 1159F PR MEDICATION LIST DOCUMENTED IN MEDICAL RECORD: ICD-10-PCS | Mod: CPTII,,, | Performed by: ORTHOPAEDIC SURGERY

## 2023-03-06 PROCEDURE — 99213 OFFICE O/P EST LOW 20 MIN: CPT | Mod: ,,, | Performed by: ORTHOPAEDIC SURGERY

## 2023-03-06 PROCEDURE — 1160F PR REVIEW ALL MEDS BY PRESCRIBER/CLIN PHARMACIST DOCUMENTED: ICD-10-PCS | Mod: CPTII,,, | Performed by: ORTHOPAEDIC SURGERY

## 2023-03-06 PROCEDURE — 3078F PR MOST RECENT DIASTOLIC BLOOD PRESSURE < 80 MM HG: ICD-10-PCS | Mod: CPTII,,, | Performed by: ORTHOPAEDIC SURGERY

## 2023-03-06 PROCEDURE — 3008F BODY MASS INDEX DOCD: CPT | Mod: CPTII,,, | Performed by: ORTHOPAEDIC SURGERY

## 2023-03-06 PROCEDURE — 3075F PR MOST RECENT SYSTOLIC BLOOD PRESS GE 130-139MM HG: ICD-10-PCS | Mod: CPTII,,, | Performed by: ORTHOPAEDIC SURGERY

## 2023-03-06 PROCEDURE — 1160F RVW MEDS BY RX/DR IN RCRD: CPT | Mod: CPTII,,, | Performed by: ORTHOPAEDIC SURGERY

## 2023-03-06 PROCEDURE — 1159F MED LIST DOCD IN RCRD: CPT | Mod: CPTII,,, | Performed by: ORTHOPAEDIC SURGERY

## 2023-03-06 PROCEDURE — 3008F PR BODY MASS INDEX (BMI) DOCUMENTED: ICD-10-PCS | Mod: CPTII,,, | Performed by: ORTHOPAEDIC SURGERY

## 2023-03-06 NOTE — PROGRESS NOTES
"Subjective:    CC: Follow-up (rt hand had an inject 1/26/23 with no relief. he does report numbness at the index finger.)       HPI:  Patient returns today for repeat exam.  Patient states he had minimal relief with the carpal tunnel injection about 6 weeks ago.  He is mainly symptomatic about his right shoulder, deltoid region.  He has seen, and had a cervical spine injection a couple weeks ago, he states that has helped the shoulder issue more.  We have discussed his previous nerve conduction study demonstrating a chronic C5 radiculopathy as well as moderate carpal tunnel.  He is interested in proceeding with carpal tunnel release, this year.    ROS: Refer to HPI for pertinent ROS. All other 12 point systems negative.    Objective:  Vitals:    03/06/23 0857   BP: 135/77   Pulse: 91   Weight: 86.4 kg (190 lb 6.4 oz)   Height: 5' 10" (1.778 m)        Physical Exam:  Right upper extremity compartment soft and warm.  Skin is intact.  There is no signs symptoms of DVT or infection.  He does have some numbness and tingling throughout the median nerve distribution.  Questionable Tinel's and Phalen's.  He is able to make a full fist has full extension, neurovascular intact distally.    Images: . Images Reviewed and discussed with patient.    Assessment:  1. Cervical radiculopathy    2. Right carpal tunnel syndrome        Plan:  At this time we discussed his physical exam and previous imaging findings.  He is getting relief with the cervical spine injections, he is more symptomatic of his shoulder, he will continue with this.  He does have some numbness and tingling throughout the tip of his fingers, median nerve distribution, we have discussed carpal tunnel release if and when needed.  I would like see back in 2 months for possible preop appointment.    Follow UP: No follow-ups on file.              "

## 2023-03-13 ENCOUNTER — OFFICE VISIT (OUTPATIENT)
Dept: PAIN MEDICINE | Facility: CLINIC | Age: 53
End: 2023-03-13
Payer: COMMERCIAL

## 2023-03-13 VITALS
HEART RATE: 69 BPM | DIASTOLIC BLOOD PRESSURE: 88 MMHG | HEIGHT: 70 IN | SYSTOLIC BLOOD PRESSURE: 125 MMHG | BODY MASS INDEX: 27.2 KG/M2 | TEMPERATURE: 98 F | WEIGHT: 190 LBS

## 2023-03-13 DIAGNOSIS — M50.30 DDD (DEGENERATIVE DISC DISEASE), CERVICAL: ICD-10-CM

## 2023-03-13 DIAGNOSIS — M54.12 CERVICAL RADICULITIS: Primary | ICD-10-CM

## 2023-03-13 DIAGNOSIS — M54.2 CERVICALGIA: ICD-10-CM

## 2023-03-13 PROCEDURE — 3074F SYST BP LT 130 MM HG: CPT | Mod: CPTII,,, | Performed by: ANESTHESIOLOGY

## 2023-03-13 PROCEDURE — 1159F PR MEDICATION LIST DOCUMENTED IN MEDICAL RECORD: ICD-10-PCS | Mod: CPTII,,, | Performed by: ANESTHESIOLOGY

## 2023-03-13 PROCEDURE — 99214 PR OFFICE/OUTPT VISIT, EST, LEVL IV, 30-39 MIN: ICD-10-PCS | Mod: ,,, | Performed by: ANESTHESIOLOGY

## 2023-03-13 PROCEDURE — 1160F RVW MEDS BY RX/DR IN RCRD: CPT | Mod: CPTII,,, | Performed by: ANESTHESIOLOGY

## 2023-03-13 PROCEDURE — 3079F DIAST BP 80-89 MM HG: CPT | Mod: CPTII,,, | Performed by: ANESTHESIOLOGY

## 2023-03-13 PROCEDURE — 1160F PR REVIEW ALL MEDS BY PRESCRIBER/CLIN PHARMACIST DOCUMENTED: ICD-10-PCS | Mod: CPTII,,, | Performed by: ANESTHESIOLOGY

## 2023-03-13 PROCEDURE — 3008F PR BODY MASS INDEX (BMI) DOCUMENTED: ICD-10-PCS | Mod: CPTII,,, | Performed by: ANESTHESIOLOGY

## 2023-03-13 PROCEDURE — 3079F PR MOST RECENT DIASTOLIC BLOOD PRESSURE 80-89 MM HG: ICD-10-PCS | Mod: CPTII,,, | Performed by: ANESTHESIOLOGY

## 2023-03-13 PROCEDURE — 1159F MED LIST DOCD IN RCRD: CPT | Mod: CPTII,,, | Performed by: ANESTHESIOLOGY

## 2023-03-13 PROCEDURE — 3008F BODY MASS INDEX DOCD: CPT | Mod: CPTII,,, | Performed by: ANESTHESIOLOGY

## 2023-03-13 PROCEDURE — 3074F PR MOST RECENT SYSTOLIC BLOOD PRESSURE < 130 MM HG: ICD-10-PCS | Mod: CPTII,,, | Performed by: ANESTHESIOLOGY

## 2023-03-13 PROCEDURE — 99214 OFFICE O/P EST MOD 30 MIN: CPT | Mod: ,,, | Performed by: ANESTHESIOLOGY

## 2023-03-13 RX ORDER — CHLORZOXAZONE 500 MG/1
500 TABLET ORAL 3 TIMES DAILY PRN
Qty: 50 TABLET | Refills: 0 | Status: SHIPPED | OUTPATIENT
Start: 2023-03-13 | End: 2023-03-27 | Stop reason: SDUPTHER

## 2023-03-13 NOTE — PROGRESS NOTES
Lamar Villanueva MD        PATIENT NAME: Allen Esparza  : 1970  DATE: 3/13/23  MRN: [id number]      Billing Provider: Lamar Villanueva MD  Level of Service:   Patient PCP Information       Provider PCP Type    Yan Mahajan MD General            Reason for Visit / Chief Complaint: Neck Pain (Post-op ERNST pt states he got little relief, was able to sleep good for about 2 weeks, since then pain has slightly increased, discomfort in right shoulder feels like a balled fist, pt states right arm is weaker, pain level 5/10)       Update PCP  Update Chief Complaint         History of Present Illness / Problem Focused Workflow     Allen Esparza presents to the clinic with Neck Pain (Post-op ERNST pt states he got little relief, was able to sleep good for about 2 weeks, since then pain has slightly increased, discomfort in right shoulder feels like a balled fist, pt states right arm is weaker, pain level 5/10)     This is a 52-year-old male who presents to clinic today for follow up of pain radiating from his neck down his right upper extremity to the 1st and 2nd digits of his right hand.  His pain started in 2022 after he had been golfing at a 4 day tournament.  He states that his right arm went numb down to his hand.  He initially thought it was coming from his shoulder, as he had undergone right shoulder surgery in 2021.  He tried massage therapy and physical therapy, but his pain did not improve.  He denies any symptoms radiating into the left upper extremity.  He underwent a cervical epidural steroid injection a couple weeks ago that has been helping somewhat.  He did start noticing some increasing pain about 3 days ago.  It is still not quite as bad as it was prior to the injection, but he is having difficulty sleeping because of the pain.  He takes a muscle relaxer, which does help him to sleep, but he can not take it during the day.  Tylenol and ice packs help a little bit.  Norco did  not help.  He has not undergone any previous surgery on his cervical spine.  He does have a history of L5-S1 fusion and underwent some injections in his lower back in the past.       Neck Pain   Associated symptoms include numbness.     Review of Systems     Review of Systems   Musculoskeletal:  Positive for neck pain and neck stiffness.   Neurological:  Positive for numbness.   Psychiatric/Behavioral:  Positive for sleep disturbance.    All other systems reviewed and are negative.     Medical / Social / Family History     Past Medical History:   Diagnosis Date    Cervical radiculitis        Past Surgical History:   Procedure Laterality Date    BACK SURGERY      lumbar Fusion    ELBOW SURGERY Left     EPIDURAL STEROID INJECTION INTO CERVICAL SPINE      EPIDURAL STEROID INJECTION INTO CERVICAL SPINE N/A 2/23/2023    Procedure: INJECTION, STEROID, SPINE, CERVICAL, EPIDURAL C7-T1;  Surgeon: Lamar Villanueva MD;  Location: [facility];  Service: Pain Management;  Laterality: N/A;    SHOULDER SURGERY Right        Social History  Mr. Esparza  reports that he has never smoked. He has never used smokeless tobacco. He reports that he does not currently use alcohol. He reports that he does not use drugs.    Family History  Mr.'s Esparza family history includes No Known Problems in his father and mother.    Medications and Allergies     Medications  Outpatient Medications Marked as Taking for the 3/13/23 encounter (Office Visit) with Lamar Villanueva MD   Medication Sig Dispense Refill    atomoxetine (STRATTERA) 25 MG capsule Take 25 mg by mouth 2 (two) times daily.      cyclobenzaprine (FLEXERIL) 10 MG tablet Take 10 mg by mouth 2 (two) times daily as needed.      diclofenac (VOLTAREN) 75 MG EC tablet Take 75 mg by mouth 2 (two) times daily.      tadalafiL (CIALIS) 5 MG tablet Take 5 mg by mouth once daily.         Allergies  Review of patient's allergies indicates:  No Known Allergies    Physical Examination     Vitals:     03/13/23 1135   BP: 125/88   Pulse: 69   Temp: 98.4 °F (36.9 °C)     Spine Musculoskeletal Exam    Gait    Gait is normal.    Inspection    Cervical Spine    Cervical spine inspection is normal.    Palpation    Cervical Spine    Tenderness: none    Right      Masses: none      Spasms: none      Crepitus: none      Muscle tone: normal    Left      Masses: none      Spasms: none      Crepitus: none      Muscle tone: normal    Range of Motion    Cervical Spine       Cervical flexion: 1-2 finger breadths.     Cervical extension: reduced extension (76-90%). Cervical extension detail: pain.       Right      Lateral bending: reduced bend (76-90%). Lateral bending detail: pain.       Left      Lateral bending: normal.      Strength    Cervical Spine    Cervical spine motor exam is normal.    Sensory    Cervical Spine    Cervical spine sensation is normal.    General      Constitutional: appears stated age, well-developed and well-nourished    Scleral icterus: no    Labored breathing: no    Psychiatric: normal mood and affect and no acute distress    Neurological: alert and oriented x3    Skin: intact    Lymphadenopathy: none  General additional comments: Resp: non labored  CV: ext warm well perfused     Assessment and Plan (including Health Maintenance)      Problem List  Smart Sets  Document Outside HM   :    Plan:   Cervical radiculitis  -     chlorzoxazone (PARAFON FORTE) 500 mg Tab; Take 1 tablet (500 mg total) by mouth 3 (three) times daily as needed (muscle spasms).  Dispense: 50 tablet; Refill: 0    DDD (degenerative disc disease), cervical  -     chlorzoxazone (PARAFON FORTE) 500 mg Tab; Take 1 tablet (500 mg total) by mouth 3 (three) times daily as needed (muscle spasms).  Dispense: 50 tablet; Refill: 0    Cervicalgia  -     chlorzoxazone (PARAFON FORTE) 500 mg Tab; Take 1 tablet (500 mg total) by mouth 3 (three) times daily as needed (muscle spasms).  Dispense: 50 tablet; Refill: 0       Complains of pain  radiating from his neck down the right upper extremity to the 1st and 2nd digits of his right hand.  He had an EMG which showed a C5 radiculopathy.  His x-rays have shown degenerative changes in the cervical spine as well.  MRI of the cervical spine without contrast revealed disc degeneration and spondylosis, with foraminal stenosis on the right, consistent with his symptoms.  He initially had some good relief of his pain after a cervical epidural steroid injection a couple weeks ago.  Some of his pain has been starting to increase since last Friday.  We will proceed with a 2nd in a series of up to 3 injections if needed.  I am scheduling him for a cervical epidural steroid injection (C7-T1).  I am also going to send in some chlorzoxazone for him for the muscle spasms since he has drowsiness after taking Flexeril.  Plan was discussed with the patient and he wishes to proceed.    Problem List Items Addressed This Visit       Cervical radiculitis - Primary    Relevant Medications    chlorzoxazone (PARAFON FORTE) 500 mg Tab    Cervicalgia    Relevant Medications    chlorzoxazone (PARAFON FORTE) 500 mg Tab    DDD (degenerative disc disease), cervical    Relevant Medications    chlorzoxazone (PARAFON FORTE) 500 mg Tab         Future Appointments   Date Time Provider Department Center   4/25/2023  1:45 PM Lamar Villanueva MD [facility]   5/8/2023  8:45 AM Zurdo Massey MD [facility]        There are no Patient Instructions on file for this visit.  No follow-ups on file.     Signature:  Lamar Villanueva MD      Date of encounter: 3/13/23

## 2023-03-27 ENCOUNTER — TELEPHONE (OUTPATIENT)
Dept: PAIN MEDICINE | Facility: CLINIC | Age: 53
End: 2023-03-27
Payer: COMMERCIAL

## 2023-03-27 DIAGNOSIS — M54.12 CERVICAL RADICULITIS: ICD-10-CM

## 2023-03-27 DIAGNOSIS — M50.30 DDD (DEGENERATIVE DISC DISEASE), CERVICAL: ICD-10-CM

## 2023-03-27 DIAGNOSIS — M54.2 CERVICALGIA: ICD-10-CM

## 2023-03-27 RX ORDER — CHLORZOXAZONE 500 MG/1
500 TABLET ORAL 3 TIMES DAILY PRN
Qty: 50 TABLET | Refills: 0 | Status: SHIPPED | OUTPATIENT
Start: 2023-03-27 | End: 2023-06-20

## 2023-03-27 NOTE — TELEPHONE ENCOUNTER
Patient called the Pharmacy for a early refill on the Parafon Forte because he is going on vacation out of the country and leaving on Thursday.  Is it ok to fill?

## 2023-04-11 ENCOUNTER — PATIENT MESSAGE (OUTPATIENT)
Dept: ADMINISTRATIVE | Facility: OTHER | Age: 53
End: 2023-04-11
Payer: COMMERCIAL

## 2023-04-13 ENCOUNTER — ANESTHESIA (OUTPATIENT)
Dept: SURGERY | Facility: HOSPITAL | Age: 53
End: 2023-04-13
Payer: COMMERCIAL

## 2023-04-13 ENCOUNTER — HOSPITAL ENCOUNTER (OUTPATIENT)
Facility: HOSPITAL | Age: 53
Discharge: HOME | End: 2023-04-13
Attending: ANESTHESIOLOGY | Admitting: ANESTHESIOLOGY
Payer: COMMERCIAL

## 2023-04-13 ENCOUNTER — ANESTHESIA EVENT (OUTPATIENT)
Dept: SURGERY | Facility: HOSPITAL | Age: 53
End: 2023-04-13
Payer: COMMERCIAL

## 2023-04-13 DIAGNOSIS — G89.29 CHRONIC NECK PAIN: ICD-10-CM

## 2023-04-13 DIAGNOSIS — M54.2 CHRONIC NECK PAIN: ICD-10-CM

## 2023-04-13 PROCEDURE — 63600175 PHARM REV CODE 636 W HCPCS: Performed by: NURSE ANESTHETIST, CERTIFIED REGISTERED

## 2023-04-13 PROCEDURE — 62321 NJX INTERLAMINAR CRV/THRC: CPT | Performed by: ANESTHESIOLOGY

## 2023-04-13 PROCEDURE — 25000003 PHARM REV CODE 250: Performed by: ANESTHESIOLOGY

## 2023-04-13 PROCEDURE — 37000008 HC ANESTHESIA 1ST 15 MINUTES: Performed by: ANESTHESIOLOGY

## 2023-04-13 PROCEDURE — D9220A PRA ANESTHESIA: ICD-10-PCS | Mod: ,,, | Performed by: NURSE ANESTHETIST, CERTIFIED REGISTERED

## 2023-04-13 PROCEDURE — 63600175 PHARM REV CODE 636 W HCPCS: Performed by: ANESTHESIOLOGY

## 2023-04-13 PROCEDURE — D9220A PRA ANESTHESIA: Mod: ,,, | Performed by: NURSE ANESTHETIST, CERTIFIED REGISTERED

## 2023-04-13 PROCEDURE — 62321 PR INJ CERV/THORAC, W/GUIDANCE: ICD-10-PCS | Mod: ,,, | Performed by: ANESTHESIOLOGY

## 2023-04-13 PROCEDURE — A4216 STERILE WATER/SALINE, 10 ML: HCPCS | Performed by: ANESTHESIOLOGY

## 2023-04-13 PROCEDURE — 62321 NJX INTERLAMINAR CRV/THRC: CPT | Mod: ,,, | Performed by: ANESTHESIOLOGY

## 2023-04-13 PROCEDURE — 25000003 PHARM REV CODE 250: Performed by: NURSE ANESTHETIST, CERTIFIED REGISTERED

## 2023-04-13 RX ORDER — DEXAMETHASONE SODIUM PHOSPHATE 10 MG/ML
INJECTION INTRAMUSCULAR; INTRAVENOUS
Status: DISCONTINUED | OUTPATIENT
Start: 2023-04-13 | End: 2023-04-13 | Stop reason: HOSPADM

## 2023-04-13 RX ORDER — LIDOCAINE HYDROCHLORIDE 10 MG/ML
INJECTION, SOLUTION EPIDURAL; INFILTRATION; INTRACAUDAL; PERINEURAL
Status: DISCONTINUED | OUTPATIENT
Start: 2023-04-13 | End: 2023-04-13

## 2023-04-13 RX ORDER — LIDOCAINE HYDROCHLORIDE 10 MG/ML
INJECTION, SOLUTION EPIDURAL; INFILTRATION; INTRACAUDAL; PERINEURAL
Status: DISCONTINUED | OUTPATIENT
Start: 2023-04-13 | End: 2023-04-13 | Stop reason: HOSPADM

## 2023-04-13 RX ORDER — SODIUM CHLORIDE, SODIUM GLUCONATE, SODIUM ACETATE, POTASSIUM CHLORIDE AND MAGNESIUM CHLORIDE 30; 37; 368; 526; 502 MG/100ML; MG/100ML; MG/100ML; MG/100ML; MG/100ML
INJECTION, SOLUTION INTRAVENOUS CONTINUOUS
Status: CANCELLED | OUTPATIENT
Start: 2023-04-13 | End: 2023-05-13

## 2023-04-13 RX ORDER — MIDAZOLAM HYDROCHLORIDE 1 MG/ML
2 INJECTION INTRAMUSCULAR; INTRAVENOUS ONCE AS NEEDED
Status: CANCELLED | OUTPATIENT
Start: 2023-04-13 | End: 2034-09-09

## 2023-04-13 RX ORDER — SODIUM CHLORIDE 9 MG/ML
INJECTION, SOLUTION INTRAMUSCULAR; INTRAVENOUS; SUBCUTANEOUS
Status: DISCONTINUED
Start: 2023-04-13 | End: 2023-04-13 | Stop reason: HOSPADM

## 2023-04-13 RX ORDER — HYDROMORPHONE HYDROCHLORIDE 2 MG/ML
0.2 INJECTION, SOLUTION INTRAMUSCULAR; INTRAVENOUS; SUBCUTANEOUS EVERY 5 MIN PRN
Status: CANCELLED | OUTPATIENT
Start: 2023-04-13

## 2023-04-13 RX ORDER — ACETAMINOPHEN 10 MG/ML
1000 INJECTION, SOLUTION INTRAVENOUS ONCE
Status: CANCELLED | OUTPATIENT
Start: 2023-04-13 | End: 2023-04-13

## 2023-04-13 RX ORDER — LIDOCAINE HYDROCHLORIDE 10 MG/ML
1 INJECTION, SOLUTION EPIDURAL; INFILTRATION; INTRACAUDAL; PERINEURAL ONCE
Status: CANCELLED | OUTPATIENT
Start: 2023-04-13 | End: 2023-04-13

## 2023-04-13 RX ORDER — PROPOFOL 10 MG/ML
VIAL (ML) INTRAVENOUS
Status: DISCONTINUED | OUTPATIENT
Start: 2023-04-13 | End: 2023-04-13

## 2023-04-13 RX ORDER — SODIUM CHLORIDE 0.9 % (FLUSH) 0.9 %
SYRINGE (ML) INJECTION
Status: DISCONTINUED | OUTPATIENT
Start: 2023-04-13 | End: 2023-04-13 | Stop reason: HOSPADM

## 2023-04-13 RX ORDER — DIPHENHYDRAMINE HYDROCHLORIDE 50 MG/ML
25 INJECTION INTRAMUSCULAR; INTRAVENOUS EVERY 6 HOURS PRN
Status: CANCELLED | OUTPATIENT
Start: 2023-04-13

## 2023-04-13 RX ORDER — DEXAMETHASONE SODIUM PHOSPHATE 10 MG/ML
INJECTION INTRAMUSCULAR; INTRAVENOUS
Status: DISCONTINUED
Start: 2023-04-13 | End: 2023-04-13 | Stop reason: HOSPADM

## 2023-04-13 RX ORDER — LIDOCAINE HYDROCHLORIDE 10 MG/ML
INJECTION, SOLUTION EPIDURAL; INFILTRATION; INTRACAUDAL; PERINEURAL
Status: DISCONTINUED
Start: 2023-04-13 | End: 2023-04-13 | Stop reason: HOSPADM

## 2023-04-13 RX ORDER — ONDANSETRON 2 MG/ML
4 INJECTION INTRAMUSCULAR; INTRAVENOUS DAILY PRN
Status: CANCELLED | OUTPATIENT
Start: 2023-04-13

## 2023-04-13 RX ADMIN — SODIUM CHLORIDE, POTASSIUM CHLORIDE, SODIUM LACTATE AND CALCIUM CHLORIDE: 600; 310; 30; 20 INJECTION, SOLUTION INTRAVENOUS at 09:04

## 2023-04-13 RX ADMIN — PROPOFOL 80 MG: 10 INJECTION, EMULSION INTRAVENOUS at 10:04

## 2023-04-13 RX ADMIN — PROPOFOL 20 MG: 10 INJECTION, EMULSION INTRAVENOUS at 10:04

## 2023-04-13 RX ADMIN — LIDOCAINE HYDROCHLORIDE 50 MG: 10 INJECTION, SOLUTION EPIDURAL; INFILTRATION; INTRACAUDAL; PERINEURAL at 10:04

## 2023-04-13 NOTE — ANESTHESIA PREPROCEDURE EVALUATION
04/13/2023  Allen Esparza is a 52 y.o., male.    Allen Esparza    Pre-op Diagnosis: Cervical radiculitis [M54.12]  DDD (degenerative disc disease), cervical [M50.30]  Cervicalgia [M54.2]    Procedure(s): INJECTION, STEROID, SPINE, CERVICAL, EPIDURAL C7-T1     Review of patient's allergies indicates:  No Known Allergies    Current Outpatient Medications   Medication Instructions    atomoxetine (STRATTERA) 25 mg, Oral, 2 times daily    chlorzoxazone (PARAFON FORTE) 500 mg, Oral, 3 times daily PRN    cyclobenzaprine (FLEXERIL) 10 mg, Oral, 2 times daily PRN    diclofenac (VOLTAREN) 75 mg, Oral, 2 times daily    methylPREDNISolone (MEDROL DOSEPACK) 4 mg tablet use as directed    methylPREDNISolone (MEDROL DOSEPACK) 4 mg tablet use as directed    tadalafiL (CIALIS) 5 mg, Oral, Daily         Past Medical History:   Diagnosis Date    Arthritis     Cervical radiculitis        Past Surgical History:   Procedure Laterality Date    BACK SURGERY      lumbar Fusion    ELBOW SURGERY Left     EPIDURAL STEROID INJECTION INTO CERVICAL SPINE      EPIDURAL STEROID INJECTION INTO CERVICAL SPINE N/A 2/23/2023    Procedure: INJECTION, STEROID, SPINE, CERVICAL, EPIDURAL C7-T1;  Surgeon: Lamar Villanueva MD;  Location: [facility];  Service: Pain Management;  Laterality: N/A;    SHOULDER SURGERY Right            Pre-op Assessment    I have reviewed the Patient Summary Reports.    I have reviewed the NPO Status.   I have reviewed the Medications.     Review of Systems  Anesthesia Hx:  PONV c general anesthesia Denies Family Hx of Anesthesia complications.  Personal Hx of Anesthesia complications, Post-Operative Nausea/Vomiting   Social:  Non-Smoker    Cardiovascular:   Exercise tolerance: good  Denies Angina.  Denies Orthopnea.  Denies PND.  Denies WAYNE.  Functional Capacity good / => 4 METS    Musculoskeletal:   Arthritis      Neurological:   Denies TIA. Denies CVA.    Psych:   Psychiatric History (adhd)          Physical Exam  General: Well nourished, Alert and Oriented    Airway:  Mallampati: III   Mouth Opening: Normal  TM Distance: Normal  Tongue: Normal  Neck ROM: Normal ROM    Dental:  Intact    Chest/Lungs:  Clear to auscultation    Heart:  Rate: Normal  Rhythm: Regular Rhythm  No pretibial edema  No carotid bruits      Anesthesia Plan  Type of Anesthesia, risks & benefits discussed:    Anesthesia Type: Gen Natural Airway  Intra-op Monitoring Plan: Standard ASA Monitors  Post Op Pain Control Plan: IV/PO Opioids PRN  Induction:  IV  Informed Consent: Informed consent signed with the Patient and all parties understand the risks and agree with anesthesia plan.  All questions answered. Patient consented to blood products? No  ASA Score: 2  Day of Surgery Review of History & Physical: H&P Update referred to the surgeon/provider.  Anesthesia Plan Notes: GA TIVA    Ready For Surgery From Anesthesia Perspective.     .

## 2023-04-13 NOTE — DISCHARGE SUMMARY
[facility] Surgical - Periop Services  Discharge Note  Short Stay    Procedure(s) (LRB):  INJECTION, STEROID, SPINE, CERVICAL, EPIDURAL C7 T1 (N/A)      OUTCOME: Patient tolerated treatment/procedure well without complication and is now ready for discharge.    DISPOSITION: Home or Self Care    FINAL DIAGNOSIS:  <principal problem not specified>    FOLLOWUP: In clinic    DISCHARGE INSTRUCTIONS:  No discharge procedures on file.     TIME SPENT ON DISCHARGE: 5 minutes

## 2023-04-13 NOTE — TRANSFER OF CARE
"Anesthesia Transfer of Care Note    Patient: Allen Esparza    Procedure(s) Performed: Procedure(s) (LRB):  INJECTION, STEROID, SPINE, CERVICAL, EPIDURAL C7 T1 (N/A)    Patient location: PACU    Anesthesia Type: general    Transport from OR: Transported from OR on room air with adequate spontaneous ventilation    Post pain: adequate analgesia    Post assessment: no apparent anesthetic complications    Post vital signs: stable    Level of consciousness: sedated    Nausea/Vomiting: no nausea/vomiting    Complications: none    Transfer of care protocol was followed      Last vitals:   Visit Vitals  /73   Pulse 71   Temp 36.9 °C (98.4 °F) (Oral)   Ht 5' 10" (1.778 m)   Wt 85.7 kg (188 lb 15 oz)   SpO2 96%   BMI 27.11 kg/m²     "

## 2023-04-13 NOTE — H&P
Reason for Visit / Chief Complaint: Neck Pain (Post-op ERNST pt states he got little relief, was able to sleep good for about 2 weeks, since then pain has slightly increased, discomfort in right shoulder feels like a balled fist, pt states right arm is weaker, pain level 5/10)         Update PCP   Update Chief Complaint             History of Present Illness / Problem Focused Workflow      Allen Esparza presents to the clinic with Neck Pain (Post-op ERNST pt states he got little relief, was able to sleep good for about 2 weeks, since then pain has slightly increased, discomfort in right shoulder feels like a balled fist, pt states right arm is weaker, pain level 5/10)     This is a 52-year-old male who presents to clinic today for follow up of pain radiating from his neck down his right upper extremity to the 1st and 2nd digits of his right hand.  His pain started in August 2022 after he had been golfing at a 4 day tournament.  He states that his right arm went numb down to his hand.  He initially thought it was coming from his shoulder, as he had undergone right shoulder surgery in December 2021.  He tried massage therapy and physical therapy, but his pain did not improve.  He denies any symptoms radiating into the left upper extremity.  He underwent a cervical epidural steroid injection a couple weeks ago that has been helping somewhat.  He did start noticing some increasing pain about 3 days ago.  It is still not quite as bad as it was prior to the injection, but he is having difficulty sleeping because of the pain.  He takes a muscle relaxer, which does help him to sleep, but he can not take it during the day.  Tylenol and ice packs help a little bit.  Norco did not help.  He has not undergone any previous surgery on his cervical spine.  He does have a history of L5-S1 fusion and underwent some injections in his lower back in the past.         Neck Pain   Associated symptoms include numbness.      Review of  Systems      Review of Systems   Musculoskeletal:  Positive for neck pain and neck stiffness.   Neurological:  Positive for numbness.   Psychiatric/Behavioral:  Positive for sleep disturbance.    All other systems reviewed and are negative.      Medical / Social / Family History           Past Medical History:   Diagnosis Date    Cervical radiculitis                 Past Surgical History:   Procedure Laterality Date    BACK SURGERY         lumbar Fusion    ELBOW SURGERY Left      EPIDURAL STEROID INJECTION INTO CERVICAL SPINE        EPIDURAL STEROID INJECTION INTO CERVICAL SPINE N/A 2/23/2023     Procedure: INJECTION, STEROID, SPINE, CERVICAL, EPIDURAL C7-T1;  Surgeon: Lamar Villanueva MD;  Location: [facility];  Service: Pain Management;  Laterality: N/A;    SHOULDER SURGERY Right           Social History  Mr. Esparza  reports that he has never smoked. He has never used smokeless tobacco. He reports that he does not currently use alcohol. He reports that he does not use drugs.     Family History  Mr.'s Esparza family history includes No Known Problems in his father and mother.     Medications and Allergies      Medications         Outpatient Medications Marked as Taking for the 3/13/23 encounter (Office Visit) with Lamar Villanueva MD   Medication Sig Dispense Refill    atomoxetine (STRATTERA) 25 MG capsule Take 25 mg by mouth 2 (two) times daily.        cyclobenzaprine (FLEXERIL) 10 MG tablet Take 10 mg by mouth 2 (two) times daily as needed.        diclofenac (VOLTAREN) 75 MG EC tablet Take 75 mg by mouth 2 (two) times daily.        tadalafiL (CIALIS) 5 MG tablet Take 5 mg by mouth once daily.             Allergies  Review of patient's allergies indicates:  No Known Allergies     Physical Examination          Vitals:     03/13/23 1135   BP: 125/88   Pulse: 69   Temp: 98.4 °F (36.9 °C)      Spine Musculoskeletal Exam     Gait    Gait is normal.     Inspection    Cervical Spine    Cervical spine inspection is  normal.     Palpation    Cervical Spine    Tenderness: none    Right      Masses: none      Spasms: none      Crepitus: none      Muscle tone: normal    Left      Masses: none      Spasms: none      Crepitus: none      Muscle tone: normal     Range of Motion    Cervical Spine       Cervical flexion: 1-2 finger breadths.     Cervical extension: reduced extension (76-90%). Cervical extension detail: pain.       Right      Lateral bending: reduced bend (76-90%). Lateral bending detail: pain.       Left      Lateral bending: normal.       Strength    Cervical Spine    Cervical spine motor exam is normal.     Sensory    Cervical Spine    Cervical spine sensation is normal.     General      Constitutional: appears stated age, well-developed and well-nourished    Scleral icterus: no    Labored breathing: no    Psychiatric: normal mood and affect and no acute distress    Neurological: alert and oriented x3    Skin: intact    Lymphadenopathy: none  General additional comments: Resp: non labored  CV: ext warm well perfused      Assessment and Plan (including Health Maintenance)       Problem List   Smart Sets   Document Outside HM   :     Plan:   Cervical radiculitis  -     chlorzoxazone (PARAFON FORTE) 500 mg Tab; Take 1 tablet (500 mg total) by mouth 3 (three) times daily as needed (muscle spasms).  Dispense: 50 tablet; Refill: 0     DDD (degenerative disc disease), cervical  -     chlorzoxazone (PARAFON FORTE) 500 mg Tab; Take 1 tablet (500 mg total) by mouth 3 (three) times daily as needed (muscle spasms).  Dispense: 50 tablet; Refill: 0     Cervicalgia  -     chlorzoxazone (PARAFON FORTE) 500 mg Tab; Take 1 tablet (500 mg total) by mouth 3 (three) times daily as needed (muscle spasms).  Dispense: 50 tablet; Refill: 0        Complains of pain radiating from his neck down the right upper extremity to the 1st and 2nd digits of his right hand.  He had an EMG which showed a C5 radiculopathy.  His x-rays have shown  degenerative changes in the cervical spine as well.  MRI of the cervical spine without contrast revealed disc degeneration and spondylosis, with foraminal stenosis on the right, consistent with his symptoms.  He initially had some good relief of his pain after a cervical epidural steroid injection a couple weeks ago.  Some of his pain has been starting to increase since last Friday.  We will proceed with a 2nd in a series of up to 3 injections if needed.  I am scheduling him for a cervical epidural steroid injection (C7-T1).  I am also going to send in some chlorzoxazone for him for the muscle spasms since he has drowsiness after taking Flexeril.  Plan was discussed with the patient and he wishes to proceed.

## 2023-04-13 NOTE — ANESTHESIA POSTPROCEDURE EVALUATION
Anesthesia Post Evaluation    Patient: Allen VIZCAINO Labtez    Procedure(s) Performed: Procedure(s) (LRB):  INJECTION, STEROID, SPINE, CERVICAL, EPIDURAL C7 T1 (N/A)    Final Anesthesia Type: MAC      Patient location during evaluation: med/surg floor  Patient participation: Yes- Able to Participate  Level of consciousness: awake and alert and oriented  Post-procedure vital signs: reviewed and stable  Pain management: adequate  Airway patency: patent  GERONIMO mitigation strategies: Multimodal analgesia  PONV status at discharge: No PONV  Anesthetic complications: no      Cardiovascular status: blood pressure returned to baseline and stable  Respiratory status: unassisted, spontaneous ventilation and room air  Hydration status: euvolemic  Follow-up not needed.  Comments: Patient to bed per self          Vitals Value Taken Time   /73 04/13/23 0809   Temp 36.9 °C (98.4 °F) 04/13/23 0809   Pulse 71 04/13/23 0809   Resp 20 04/13/23 1010   SpO2 96 % 04/13/23 0809         No case tracking events are documented in the log.      Pain/Quirino Score: No data recorded

## 2023-04-14 VITALS
RESPIRATION RATE: 17 BRPM | OXYGEN SATURATION: 98 % | SYSTOLIC BLOOD PRESSURE: 130 MMHG | TEMPERATURE: 98 F | BODY MASS INDEX: 27.05 KG/M2 | HEART RATE: 67 BPM | DIASTOLIC BLOOD PRESSURE: 85 MMHG | HEIGHT: 70 IN | WEIGHT: 188.94 LBS

## 2023-04-24 ENCOUNTER — OFFICE VISIT (OUTPATIENT)
Dept: PAIN MEDICINE | Facility: CLINIC | Age: 53
End: 2023-04-24
Payer: COMMERCIAL

## 2023-04-24 ENCOUNTER — TELEPHONE (OUTPATIENT)
Dept: PAIN MEDICINE | Facility: CLINIC | Age: 53
End: 2023-04-24

## 2023-04-24 VITALS
TEMPERATURE: 99 F | HEART RATE: 82 BPM | DIASTOLIC BLOOD PRESSURE: 78 MMHG | BODY MASS INDEX: 26.92 KG/M2 | WEIGHT: 188 LBS | HEIGHT: 70 IN | SYSTOLIC BLOOD PRESSURE: 115 MMHG

## 2023-04-24 DIAGNOSIS — M54.2 CERVICALGIA: ICD-10-CM

## 2023-04-24 DIAGNOSIS — M50.30 DDD (DEGENERATIVE DISC DISEASE), CERVICAL: ICD-10-CM

## 2023-04-24 DIAGNOSIS — M54.12 CERVICAL RADICULITIS: Primary | ICD-10-CM

## 2023-04-24 PROCEDURE — 99213 PR OFFICE/OUTPT VISIT, EST, LEVL III, 20-29 MIN: ICD-10-PCS | Mod: ,,, | Performed by: ANESTHESIOLOGY

## 2023-04-24 PROCEDURE — 3074F SYST BP LT 130 MM HG: CPT | Mod: CPTII,,, | Performed by: ANESTHESIOLOGY

## 2023-04-24 PROCEDURE — 3008F PR BODY MASS INDEX (BMI) DOCUMENTED: ICD-10-PCS | Mod: CPTII,,, | Performed by: ANESTHESIOLOGY

## 2023-04-24 PROCEDURE — 99213 OFFICE O/P EST LOW 20 MIN: CPT | Mod: ,,, | Performed by: ANESTHESIOLOGY

## 2023-04-24 PROCEDURE — 1159F MED LIST DOCD IN RCRD: CPT | Mod: CPTII,,, | Performed by: ANESTHESIOLOGY

## 2023-04-24 PROCEDURE — 3078F DIAST BP <80 MM HG: CPT | Mod: CPTII,,, | Performed by: ANESTHESIOLOGY

## 2023-04-24 PROCEDURE — 3074F PR MOST RECENT SYSTOLIC BLOOD PRESSURE < 130 MM HG: ICD-10-PCS | Mod: CPTII,,, | Performed by: ANESTHESIOLOGY

## 2023-04-24 PROCEDURE — 3008F BODY MASS INDEX DOCD: CPT | Mod: CPTII,,, | Performed by: ANESTHESIOLOGY

## 2023-04-24 PROCEDURE — 1159F PR MEDICATION LIST DOCUMENTED IN MEDICAL RECORD: ICD-10-PCS | Mod: CPTII,,, | Performed by: ANESTHESIOLOGY

## 2023-04-24 PROCEDURE — 1160F PR REVIEW ALL MEDS BY PRESCRIBER/CLIN PHARMACIST DOCUMENTED: ICD-10-PCS | Mod: CPTII,,, | Performed by: ANESTHESIOLOGY

## 2023-04-24 PROCEDURE — 1160F RVW MEDS BY RX/DR IN RCRD: CPT | Mod: CPTII,,, | Performed by: ANESTHESIOLOGY

## 2023-04-24 PROCEDURE — 3078F PR MOST RECENT DIASTOLIC BLOOD PRESSURE < 80 MM HG: ICD-10-PCS | Mod: CPTII,,, | Performed by: ANESTHESIOLOGY

## 2023-04-24 NOTE — PROGRESS NOTES
Lamar Villanueva MD        PATIENT NAME: Allen Esparza  : 1970  DATE: 23  MRN: [id number]      Billing Provider: Lamar Villanueva MD  Level of Service:   Patient PCP Information       Provider PCP Type    Yan Mahajan MD General            Reason for Visit / Chief Complaint: Neck Pain (Patient is following up from injections and reports that the relief lasted only a couple of days. Rates pain level today a 4/10.  Taking medication with little relief. )       Update PCP  Update Chief Complaint         History of Present Illness / Problem Focused Workflow     Allen Esparza presents to the clinic with Neck Pain (Patient is following up from injections and reports that the relief lasted only a couple of days. Rates pain level today a 4/10.  Taking medication with little relief. )     This is a 52-year-old male who presents to clinic today for follow up of pain radiating from his neck down his right upper extremity to the 1st and 2nd digits of his right hand.  His pain started in 2022 after he had been golfing at a 4 day tournament.  He states that his right arm went numb down to his hand.  He initially thought it was coming from his shoulder, as he had undergone right shoulder surgery in 2021.  He tried massage therapy and physical therapy, but his pain did not improve.  He denies any symptoms radiating into the left upper extremity.  He underwent his 2nd cervical epidural steroid injection a couple weeks ago that helped for a few days.  He did start noticing some increasing pain about 3 days ago.  It is still not quite as bad as it was prior to the injection, but he is having difficulty sleeping because of the pain.  He takes a muscle relaxer, which does help him to sleep, but he can not take it during the day.  Tylenol and ice packs help a little bit.  Norco did not help.  He has not undergone any previous surgery on his cervical spine.  He does have a history of L5-S1 fusion and  "underwent some injections in his lower back in the past.  He currently rates the pain as 4/10 on the NRS.  It gets down to 2/10 at best and up to 7/10 at worst.  He played golf yesterday, which seems to have aggravated his pain somewhat.  He took an NSAID last night but it really did not help.  Overall, his pain has improved somewhat since his 1st appointment here, but he states "it is still very much there. "      Neck Pain   Associated symptoms include numbness.     Review of Systems     Review of Systems   Musculoskeletal:  Positive for neck pain and neck stiffness.   Neurological:  Positive for numbness.   Psychiatric/Behavioral:  Positive for sleep disturbance.    All other systems reviewed and are negative.     Medical / Social / Family History     Past Medical History:   Diagnosis Date    Arthritis     Cervical radiculitis        Past Surgical History:   Procedure Laterality Date    BACK SURGERY      lumbar Fusion    ELBOW SURGERY Left     EPIDURAL STEROID INJECTION INTO CERVICAL SPINE      EPIDURAL STEROID INJECTION INTO CERVICAL SPINE N/A 2/23/2023    Procedure: INJECTION, STEROID, SPINE, CERVICAL, EPIDURAL C7-T1;  Surgeon: Lmaar Villanueva MD;  Location: [facility] OR;  Service: Pain Management;  Laterality: N/A;    EPIDURAL STEROID INJECTION INTO CERVICAL SPINE N/A 4/13/2023    Procedure: INJECTION, STEROID, SPINE, CERVICAL, EPIDURAL C7 T1;  Surgeon: Lamar Villanueva MD;  Location: [facility] OR;  Service: Pain Management;  Laterality: N/A;  C7 T1    SHOULDER SURGERY Right        Social History  Mr. Esparza  reports that he has never smoked. He has never used smokeless tobacco. He reports that he does not currently use alcohol after a past usage of about 3.0 standard drinks per week. He reports that he does not use drugs.    Family History  Mr.'s Esparza family history includes No Known Problems in his father and mother.    Medications and Allergies     Medications  Outpatient Medications Marked as Taking for the " 4/24/23 encounter (Office Visit) with Lamar Villanueva MD   Medication Sig Dispense Refill    atomoxetine (STRATTERA) 25 MG capsule Take 25 mg by mouth 2 (two) times daily.      chlorzoxazone (PARAFON FORTE) 500 mg Tab Take 1 tablet (500 mg total) by mouth 3 (three) times daily as needed (muscle spasms). 50 tablet 0    diclofenac (VOLTAREN) 75 MG EC tablet Take 75 mg by mouth 2 (two) times daily.      tadalafiL (CIALIS) 5 MG tablet Take 5 mg by mouth once daily.         Allergies  Review of patient's allergies indicates:  No Known Allergies    Physical Examination     Vitals:    04/24/23 1308   BP: 115/78   Pulse: 82   Temp: 98.7 °F (37.1 °C)     Spine Musculoskeletal Exam    Gait    Gait is normal.    Inspection    Cervical Spine    Cervical spine inspection is normal.    Palpation    Cervical Spine    Tenderness: none    Right      Masses: none      Spasms: none      Crepitus: none      Muscle tone: normal    Left      Masses: none      Spasms: none      Crepitus: none      Muscle tone: normal    Range of Motion    Cervical Spine       Cervical flexion: 1-2 finger breadths.     Cervical extension: reduced extension (76-90%). Cervical extension detail: pain.       Right      Lateral bending: reduced bend (76-90%). Lateral bending detail: pain.       Left      Lateral bending: normal.      Strength    Cervical Spine    Cervical spine motor exam is normal.    Sensory    Cervical Spine    Cervical spine sensation is normal.    General      Constitutional: appears stated age, well-developed and well-nourished    Scleral icterus: no    Labored breathing: no    Psychiatric: normal mood and affect and no acute distress    Neurological: alert and oriented x3    Skin: intact    Lymphadenopathy: none  General additional comments: Resp: non labored  CV: ext warm well perfused     Assessment and Plan (including Health Maintenance)      Problem List  Smart Sets  Document Outside HM   :    Plan:   Cervical radiculitis    DDD  (degenerative disc disease), cervical    Cervicalgia       Has undergone a series of 2 cervical epidural steroid injections with some slight improvement overall, but continues to complain of neck pain radiating down the right arm to the 1st and 2nd digits of his right hand.  At this point, I am going to refer him to Dr. Silva for neurosurgical evaluation.  I will follow up with him pending this consultation.    Problem List Items Addressed This Visit       Cervical radiculitis - Primary    Cervicalgia    DDD (degenerative disc disease), cervical         Future Appointments   Date Time Provider Department Center   5/8/2023  8:45 AM Zurdo Massey MD [facility]        There are no Patient Instructions on file for this visit.  No follow-ups on file.     Signature:  Lamar Villanueva MD      Date of encounter: 4/24/23

## 2023-05-03 ENCOUNTER — TELEPHONE (OUTPATIENT)
Dept: NEUROSURGERY | Facility: CLINIC | Age: 53
End: 2023-05-03
Payer: COMMERCIAL

## 2023-05-04 ENCOUNTER — PATIENT MESSAGE (OUTPATIENT)
Dept: PAIN MEDICINE | Facility: CLINIC | Age: 53
End: 2023-05-04
Payer: COMMERCIAL

## 2023-05-05 DIAGNOSIS — M54.12 CERVICAL RADICULITIS: Primary | ICD-10-CM

## 2023-05-05 NOTE — TELEPHONE ENCOUNTER
Spoke with patient. Inquired about the referral we did receive. I scheduled him with Pat for 6/14/23 at 10:30, Xray at [facility] for 9:30. Patient has not had any recent testing since MRI in 2/2023. Denies seeing any other doctors besides Dr. Villanueva and his ortho, Dr. Zurdo Massey. Patient has had back surgery with Dr. Silva in the past (mary will pull records). He is is currently doing injections with Dr. Villanueva but denies any other conservative treatments.

## 2023-05-15 ENCOUNTER — HOSPITAL ENCOUNTER (OUTPATIENT)
Dept: RADIOLOGY | Facility: CLINIC | Age: 53
Discharge: HOME | End: 2023-05-15
Attending: ORTHOPAEDIC SURGERY
Payer: COMMERCIAL

## 2023-05-15 ENCOUNTER — OFFICE VISIT (OUTPATIENT)
Dept: ORTHOPEDICS | Facility: CLINIC | Age: 53
End: 2023-05-15
Payer: COMMERCIAL

## 2023-05-15 VITALS — BODY MASS INDEX: 26.92 KG/M2 | HEIGHT: 70 IN | WEIGHT: 188 LBS

## 2023-05-15 DIAGNOSIS — M76.61 ACHILLES TENDINITIS OF RIGHT LOWER EXTREMITY: ICD-10-CM

## 2023-05-15 DIAGNOSIS — M92.61 HAGLUND'S DEFORMITY OF RIGHT HEEL: ICD-10-CM

## 2023-05-15 DIAGNOSIS — M25.571 RIGHT ANKLE PAIN, UNSPECIFIED CHRONICITY: Primary | ICD-10-CM

## 2023-05-15 DIAGNOSIS — M25.571 RIGHT ANKLE PAIN, UNSPECIFIED CHRONICITY: ICD-10-CM

## 2023-05-15 PROCEDURE — 1160F RVW MEDS BY RX/DR IN RCRD: CPT | Mod: CPTII,,, | Performed by: ORTHOPAEDIC SURGERY

## 2023-05-15 PROCEDURE — 3008F BODY MASS INDEX DOCD: CPT | Mod: CPTII,,, | Performed by: ORTHOPAEDIC SURGERY

## 2023-05-15 PROCEDURE — 73610 X-RAY EXAM OF ANKLE: CPT | Mod: RT,,, | Performed by: ORTHOPAEDIC SURGERY

## 2023-05-15 PROCEDURE — 1159F MED LIST DOCD IN RCRD: CPT | Mod: CPTII,,, | Performed by: ORTHOPAEDIC SURGERY

## 2023-05-15 PROCEDURE — 1160F PR REVIEW ALL MEDS BY PRESCRIBER/CLIN PHARMACIST DOCUMENTED: ICD-10-PCS | Mod: CPTII,,, | Performed by: ORTHOPAEDIC SURGERY

## 2023-05-15 PROCEDURE — 99213 OFFICE O/P EST LOW 20 MIN: CPT | Mod: ,,, | Performed by: ORTHOPAEDIC SURGERY

## 2023-05-15 PROCEDURE — 3008F PR BODY MASS INDEX (BMI) DOCUMENTED: ICD-10-PCS | Mod: CPTII,,, | Performed by: ORTHOPAEDIC SURGERY

## 2023-05-15 PROCEDURE — 73610 XR ANKLE COMPLETE 3 VIEW RIGHT: ICD-10-PCS | Mod: RT,,, | Performed by: ORTHOPAEDIC SURGERY

## 2023-05-15 PROCEDURE — 1159F PR MEDICATION LIST DOCUMENTED IN MEDICAL RECORD: ICD-10-PCS | Mod: CPTII,,, | Performed by: ORTHOPAEDIC SURGERY

## 2023-05-15 PROCEDURE — 99213 PR OFFICE/OUTPT VISIT, EST, LEVL III, 20-29 MIN: ICD-10-PCS | Mod: ,,, | Performed by: ORTHOPAEDIC SURGERY

## 2023-05-15 NOTE — PROGRESS NOTES
"Subjective:    CC: Ankle Pain (R ankle pain  denies injury.  states he has not been able to run. has been using kinitic tape with relief. no swelling)       HPI:  Patient returns today complaining of right ankle pain.  Patient states he is not been able to run due to the pain on the back part of his heel.  He has been having some swelling, longstanding.    ROS: Refer to HPI for pertinent ROS. All other 12 point systems negative.    Objective:  Vitals:    05/15/23 0938   Weight: 85.3 kg (188 lb)   Height: 5' 10" (1.778 m)        Physical Exam:  Right lower extremity compartment soft and warm.  Skin is intact.  There is no signs symptoms of DVT or infection.  He is tender along the insertion area of the Achilles tendon.  His Achilles tendon is intact, negative Posey's exam.  He is 40° of ankle motion, does have a palpable spur, underlying Mike deformity.  There is no swelling there is no erythema or signs of infection, neurovascular intact distally.    Images:  X-rays three views of the right ankle demonstrates small amount of calcific tendinitis. Images Reviewed and discussed with patient.    Assessment:  1. Right ankle pain, unspecified chronicity  - X-Ray Ankle Complete Right; Future    2. Achilles tendinitis of right lower extremity    3. Mike's deformity of right heel        Plan:  At this time we discussed his physical exam and x-ray findings.  We have discussed a heel lift stretching exercises anti-inflammatories with appropriate precautions.  We have discussed possible MRI but he is not improve.  We have also discussed surgical intervention if needed.    Follow UP: No follow-ups on file.              "

## 2023-06-19 RX ORDER — AMOXICILLIN AND CLAVULANATE POTASSIUM 875; 125 MG/1; MG/1
1 TABLET, FILM COATED ORAL 2 TIMES DAILY
COMMUNITY
Start: 2023-05-08 | End: 2023-06-20

## 2023-06-19 RX ORDER — FLUOROURACIL 50 MG/G
1 CREAM TOPICAL 2 TIMES DAILY
COMMUNITY
Start: 2023-03-03 | End: 2023-06-20

## 2023-06-19 RX ORDER — DOXYCYCLINE 100 MG/1
100 CAPSULE ORAL 2 TIMES DAILY
COMMUNITY
Start: 2023-02-06 | End: 2023-06-20

## 2023-06-19 RX ORDER — MOMETASONE FUROATE 50 UG/1
2 SPRAY, METERED NASAL DAILY PRN
COMMUNITY
Start: 2023-03-01

## 2023-06-19 RX ORDER — PREDNISONE 20 MG/1
20 TABLET ORAL 2 TIMES DAILY
COMMUNITY
Start: 2023-02-06 | End: 2023-06-20

## 2023-06-20 ENCOUNTER — OFFICE VISIT (OUTPATIENT)
Dept: NEUROSURGERY | Facility: CLINIC | Age: 53
End: 2023-06-20
Payer: COMMERCIAL

## 2023-06-20 ENCOUNTER — HOSPITAL ENCOUNTER (OUTPATIENT)
Dept: RADIOLOGY | Facility: HOSPITAL | Age: 53
Discharge: HOME | End: 2023-06-20
Attending: PHYSICIAN ASSISTANT
Payer: COMMERCIAL

## 2023-06-20 VITALS
HEART RATE: 75 BPM | HEIGHT: 70 IN | DIASTOLIC BLOOD PRESSURE: 73 MMHG | BODY MASS INDEX: 27.2 KG/M2 | SYSTOLIC BLOOD PRESSURE: 110 MMHG | WEIGHT: 190 LBS | RESPIRATION RATE: 16 BRPM

## 2023-06-20 DIAGNOSIS — M54.2 CERVICALGIA: ICD-10-CM

## 2023-06-20 DIAGNOSIS — M54.12 CERVICAL RADICULITIS: ICD-10-CM

## 2023-06-20 DIAGNOSIS — M50.30 DDD (DEGENERATIVE DISC DISEASE), CERVICAL: ICD-10-CM

## 2023-06-20 DIAGNOSIS — M47.22 CERVICAL SPONDYLOSIS WITH RADICULOPATHY: Primary | ICD-10-CM

## 2023-06-20 PROCEDURE — 3074F PR MOST RECENT SYSTOLIC BLOOD PRESSURE < 130 MM HG: ICD-10-PCS | Mod: CPTII,,, | Performed by: PHYSICIAN ASSISTANT

## 2023-06-20 PROCEDURE — 3008F BODY MASS INDEX DOCD: CPT | Mod: CPTII,,, | Performed by: PHYSICIAN ASSISTANT

## 2023-06-20 PROCEDURE — 99204 PR OFFICE/OUTPT VISIT, NEW, LEVL IV, 45-59 MIN: ICD-10-PCS | Mod: ,,, | Performed by: PHYSICIAN ASSISTANT

## 2023-06-20 PROCEDURE — 3078F DIAST BP <80 MM HG: CPT | Mod: CPTII,,, | Performed by: PHYSICIAN ASSISTANT

## 2023-06-20 PROCEDURE — 3008F PR BODY MASS INDEX (BMI) DOCUMENTED: ICD-10-PCS | Mod: CPTII,,, | Performed by: PHYSICIAN ASSISTANT

## 2023-06-20 PROCEDURE — 99204 OFFICE O/P NEW MOD 45 MIN: CPT | Mod: ,,, | Performed by: PHYSICIAN ASSISTANT

## 2023-06-20 PROCEDURE — 3074F SYST BP LT 130 MM HG: CPT | Mod: CPTII,,, | Performed by: PHYSICIAN ASSISTANT

## 2023-06-20 PROCEDURE — 1160F PR REVIEW ALL MEDS BY PRESCRIBER/CLIN PHARMACIST DOCUMENTED: ICD-10-PCS | Mod: CPTII,,, | Performed by: PHYSICIAN ASSISTANT

## 2023-06-20 PROCEDURE — 1160F RVW MEDS BY RX/DR IN RCRD: CPT | Mod: CPTII,,, | Performed by: PHYSICIAN ASSISTANT

## 2023-06-20 PROCEDURE — 3078F PR MOST RECENT DIASTOLIC BLOOD PRESSURE < 80 MM HG: ICD-10-PCS | Mod: CPTII,,, | Performed by: PHYSICIAN ASSISTANT

## 2023-06-20 PROCEDURE — 72052 X-RAY EXAM NECK SPINE 6/>VWS: CPT | Mod: TC

## 2023-06-20 PROCEDURE — 1159F MED LIST DOCD IN RCRD: CPT | Mod: CPTII,,, | Performed by: PHYSICIAN ASSISTANT

## 2023-06-20 PROCEDURE — 1159F PR MEDICATION LIST DOCUMENTED IN MEDICAL RECORD: ICD-10-PCS | Mod: CPTII,,, | Performed by: PHYSICIAN ASSISTANT

## 2023-06-20 RX ORDER — FEXOFENADINE HCL AND PSEUDOEPHEDRINE HCI 180; 240 MG/1; MG/1
1 TABLET, EXTENDED RELEASE ORAL DAILY
COMMUNITY

## 2023-06-20 RX ORDER — LEVOCETIRIZINE DIHYDROCHLORIDE 5 MG/1
5 TABLET, FILM COATED ORAL NIGHTLY
COMMUNITY

## 2023-06-20 RX ORDER — PREGABALIN 75 MG/1
75 CAPSULE ORAL 2 TIMES DAILY
Qty: 60 CAPSULE | Refills: 6 | Status: SHIPPED | OUTPATIENT
Start: 2023-06-20 | End: 2023-10-24 | Stop reason: SDUPTHER

## 2023-06-20 NOTE — PATIENT INSTRUCTIONS
January 21, 2022       Alon East MD  [address] [facility] [id number]  Via In Basket      Patient: Neville Jean Baptiste   YOB: 1960   Date of Visit: 1/21/2022       Dear Dr. East:    Thank you for referring Neville Jean Baptiste to me for evaluation. Below are my notes for this visit with him.    If you have questions, please do not hesitate to call me. I look forward to following your patient along with you.      Sincerely,        Gibson Mcdermott MD        CC: No Recipients        Gibson Mcdermott MD  1/21/2022  1:51 PM  Signed  Neville Jean Baptiste is an 61 year old male. Neville Jean Baptiste is seen here today in referral from Alon East MD for evaluation of upper abdominal pain and gallbladder polyp.  Neville is noted issues with upper abdominal discomfort for the last 6-7 months.  He has narrow this down to when he consumes a significant amount of dairy or eats aches.  He has decreased his dairy intake but still eats some cheese.  This amount of dairy does not bother him.  He continues to have significant symptoms with aches.  This is mainly pain in his epigastrium.  It does not radiate.  The episode of pain can last several hours and has started to improve he notices increased flatus and loose bowel movements.  He denies any early satiety.  He notes mild nausea associated with the event but this subsides.  He denies emesis.  He denies any fevers or chills.  He notes no upper abdominal surgical history.  He denies any weight loss.       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   • Coronary artery calcium score of 25 on 9/21/2020 09/21/2020   • Gastroesophageal reflux disease with esophagitis    • Hypertension    • Impaired fasting glucose    • Obesity    • Obstructive sleep apnea on CPAP     cpap   • Testosterone  Take lyrica 75mg 1 at bedtime for 5 days, then two times daily.   deficiency    • Venous insufficiency    • Vertigo    • Vitamin D deficiency      Past Surgical History:   Procedure Laterality Date   • Carpal tunnel release Right 02/19/2021   • Carpal tunnel release Left 07/09/2021   • Colonoscopy  10/08/2018    1 polyp, sigmoid diverticulosis, internal hemorrhoids, follow up in 5 years.   • Colonoscopy diagnostic  12/08/2021    repeat in 5 yrs   • Esophagogastroduodenoscopy  10/08/2018    Distal erosive esophagitis LA grade A and irregular Z line (pathology: Garnica's without dysplasia) and gastritis (pathology: minimal chronic inflammation). Follow up in 1 year was advised.   • Esophagogastroduodenoscopy  10/15/2019    Short segment of possible Garnica's (pathology: columnar mucosa only with mild chronic inflammation, no intestinal metaplasia identified) and gastritis, follow up in 3 years.   • Esophagogastroduodenoscopy transoral flex w/bx single or mult  12/08/2021    repeat in 3 yrs, +Garnica's   • Hernia repair  as an infant   • Lipoma resection Right 02/19/2021   • Tonsillectomy and adenoidectomy       Family History   Problem Relation Age of Onset   • Hypertension Mother    • Other Father         Fibromyalgia   • Stroke/TIA Brother    • Diabetes Maternal Grandfather      Social History     Tobacco Use   • Smoking status: Never Smoker   • Smokeless tobacco: Never Used   Substance Use Topics   • Alcohol use: Yes     Alcohol/week: 2.0 standard drinks     Types: 2 Standard drinks or equivalent per week       Prior to Admission Meds:(Not in a hospital admission)    Scheduled Meds:  Continuous Infusions:  PRN Meds:    Allergies:   ALLERGIES:   Allergen Reactions   • Biaxin [Clarithromycin-Fd&C Yellow #10] RASH       Active Problems:    * No active hospital problems. *    Blood pressure 130/86, pulse 72, temperature 97.3 °F (36.3 °C), temperature source Temporal, resp. rate 16, height 5' 10\" (1.778 m), weight 121 kg (266 lb 12.1 oz).    Review of Systems   Constitutional:  Negative for chills and fever.   HENT: Negative for trouble swallowing.    Respiratory: Negative for cough and shortness of breath.    Cardiovascular: Negative for chest pain.   Gastrointestinal: Positive for abdominal pain and nausea. Negative for constipation and vomiting.   Musculoskeletal: Negative for back pain.   Hematological: Does not bruise/bleed easily.        Physical Exam  Vitals and nursing note reviewed.   Constitutional:       General: He is not in acute distress.     Appearance: Normal appearance. He is obese.   HENT:      Head: Normocephalic and atraumatic.   Eyes:      General: No scleral icterus.  Pulmonary:      Effort: Pulmonary effort is normal. No respiratory distress.   Abdominal:      Palpations: Abdomen is soft.      Tenderness: There is no abdominal tenderness.   Neurological:      General: No focal deficit present.      Mental Status: He is alert and oriented to person, place, and time.   Psychiatric:         Mood and Affect: Mood normal.         Behavior: Behavior normal.       US Abdomen Complete  Narrative: EXAM: US ABDOMEN COMPLETE    HISTORY: Unspecified abdominal pain     TECHNIQUE: Grayscale as well as color and spectral doppler imaging was  performed of the right upper quadrant.    COMPARISON:    FINDINGS:    Liver: The liver is of increased echogenicity, heterogeneous in appearance,  and measures approximately 20 cm in length. No abnormal intrahepatic ductal  prominence. Possible focal fatty sparing adjacent to the gallbladder noted.    Ascites: None.  Portal Vein: Hepatopetal flow. Velocity 18 cm/s.    Gallbladder: No evidence of cholelithiasis or gallbladder sludge. Suspected  5 x 3 x 4 mm gallbladder polyp seen. Negative sonographic Chan's sign.  Wall thickness is within normal limits at  2.4 mm. There is no wall  hyperemia demonstrated with color Doppler interrogation.  Biliary: No dilation or visualized obstruction. Common bile duct diameter  is within normal limits at 4  mm.    Pancreas: Limited visualization unremarkable as seen   Spleen: No splenic mass lesions. The spleen measures 12.3 cm in length.   Kidneys: The right kidney measures 11.0 cm, and the left kidney measures  11.1 cm in longitudinal length respectively. Normal renal echogenicity. No  renal calculi or hydronephrosis. Approximately 5.0 x 3.9 x 5.8 cm  benign-appearing upper pole exophytic right renal cyst is noted.  Abdominal aorta: Normal in caliber..   Visualized IVC: The visualized IVC appears unremarkable, and there is a  normal wave form with spectral analysis..  Impression: IMPRESSION:  Hepatic steatosis. Gallbladder polyp. Benign-appearing right  renal cyst.      Assessment:  Quincy is a 61-year-old gentleman presenting today for evaluation of upper abdominal pain with a finding on ultrasound demonstrating a small gallbladder polyp    Plan:  Quincy and I reviewed his symptoms.  His ultrasound imaging was reviewed.  He has a 5 mm polyp in his gallbladder without signs of inflammation.  Overall his symptoms sound to be more of a food intolerance/food allergy rather than related to biliary colic.  I think that he may benefit from referral to either an allergist or a specialist in food intolerance.  I do not think he would derive any benefit right now from cholecystectomy.  In regards to the polyp we are going to repeat ultrasound in 1 year.  If at that 1 year quincy we find increase in size of the presence of stones we will discuss surgery if he continues to have symptoms.  I encouraged him to stay away from a eggs and for him to try an ache substitute to see if he continues to have the symptoms.  Quincy at his wife on the phone.  All questions were answered.  He was in agreement with the plan.    Gibson Mcdermott MD  1/21/2022

## 2023-06-20 NOTE — PROGRESS NOTES
[facility]  History & Physical  Neurosurgery      Allen Esparza   [id number]   1970       SUBJECTIVE:     CHIEF COMPLAINT:  Right arm pain      HPI:  Allen Esparza is a 52 y.o. right hand dominant male who presents for neurosurgical evaluation.  The patient reports he has tightness in his neck, but it is not painful.  He complains more so of pain into the right trapezius muscle, shoulder, lateral upper arm, dorsal forearm through the 1st and 2nd fingers.  His symptoms began in August of 2022 after 4 days of playing golf.  While playing, his right arm went numb.  He saw improvement in the numbness after a few days.  However, he continued with pain and numbness in the right trapezius muscle.  He underwent right rotator cuff repair with Dr. Zurdo Massey.  Yet, he continued with pain through the arm.  He describes electrical shooting type pain through his right arm.  Previously, there was weakness in the right arm.  However he has been able to work in the gym to increase his strength.  Today, he rates his pain at 4/10.  If he sits up straight, he develops radiating pain into the right arm.  He has to keep his neck flexed oral he will experience any increase in pain.  He had 1 session of physical therapy during which traction was used.  He chose not to return, doing the exercises on his own.  Cervical epidural injections provided improvement in his symptoms for only a few days.    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  He is unable to play a few days and row.  He is able to continue to play, but his score is not good.  In the past gabapentin made him feel stupid, Lyrica made him feel funny.  He was given oral steroids for a sinus infection.  It helped to improve his symptoms some.  The patient denies disturbances in bowel or bladder function.  There is no difficulty with balance.       Past Medical History:   Diagnosis Date    Arthritis     Cervical radiculitis        Past Surgical History:    Procedure Laterality Date    BACK SURGERY      lumbar Fusion    ELBOW SURGERY Left     EPIDURAL STEROID INJECTION INTO CERVICAL SPINE      EPIDURAL STEROID INJECTION INTO CERVICAL SPINE N/A 02/23/2023    Procedure: INJECTION, STEROID, SPINE, CERVICAL, EPIDURAL C7-T1;  Surgeon: Lamar Villanueva MD;  Location: [facility] OR;  Service: Pain Management;  Laterality: N/A;    EPIDURAL STEROID INJECTION INTO CERVICAL SPINE N/A 04/13/2023    Procedure: INJECTION, STEROID, SPINE, CERVICAL, EPIDURAL C7 T1;  Surgeon: Lamar Villanueva MD;  Location: [facility] OR;  Service: Pain Management;  Laterality: N/A;  C7 T1    REPAIR OF SUPERIOR LABRAL ANTERIOR-TO-POSTERIOR (SLAP) TEAR OF SHOULDER Right 2021    ROTATOR CUFF REPAIR Right 2021    SHOULDER SURGERY Right     SURGICAL REMOVAL OF BONE SPUR      x2 right shoulder       Family History   Problem Relation Age of Onset    Diabetes Mother     Hypertension Mother     Stroke Mother     Diabetes Father     Hyperlipidemia Father     Hypertension Father        Social History     Socioeconomic History    Marital status:    Tobacco Use    Smoking status: Never    Smokeless tobacco: Never   Substance and Sexual Activity    Alcohol use: Not Currently     Alcohol/week: 3.0 standard drinks     Types: 1 Glasses of wine, 1 Cans of beer, 1 Shots of liquor per week     Comment: once a month    Drug use: Never    Sexual activity: Yes        Review of patient's allergies indicates:   Allergen Reactions    Wheat containing prod Shortness Of Breath and Tinitus        Current Outpatient Medications   Medication Instructions    fexofenadine-pseudoephedrine (ALLEGRA-D 24) 180-240 mg per 24 hr tablet 1 tablet, Oral, Daily    levocetirizine (XYZAL) 5 mg, Oral, Nightly    mometasone (NASONEX) 50 mcg/actuation nasal spray 2 sprays    tadalafiL (CIALIS) 5 mg, Oral, Daily        Review of Systems:    Review of Systems   Constitutional:  Negative for chills and fever.   HENT:  Negative for nosebleeds and sore  "throat.    Eyes:  Negative for pain and visual disturbance.   Respiratory:  Negative for cough, chest tightness and shortness of breath.    Cardiovascular:  Negative for chest pain.   Gastrointestinal:  Negative for diarrhea, nausea and vomiting.   Genitourinary:  Negative for difficulty urinating, dysuria and hematuria.   Musculoskeletal:  Positive for neck stiffness. Negative for gait problem and myalgias.   Skin:  Negative for rash.   Neurological:  Positive for numbness. Negative for dizziness, facial asymmetry and headaches.   Psychiatric/Behavioral:  Negative for confusion and sleep disturbance. The patient is not nervous/anxious.         OBJECTIVE:       Visit Vitals  /73   Pulse 75   Resp 16   Ht 5' 10" (1.778 m)   Wt 86.2 kg (190 lb)   BMI 27.26 kg/m²        General:  Pleasant, Well-nourished, Well-groomed.    CV:  Neck is supple.  There are no carotid bruits.  Heart has regular rate and rhythm.    Lungs:  Lungs are clear to auscultation bilaterally with non-labored respirations.    Abdomen:  Soft, non-tender, non-distended.    Musculoskeletal:  Cervical ROM:  Mildly limited with extension.  Tinel's is positive at bilateral elbows, negative at bilateral wrists.  Spurling's maneuver on the right causes pain to radiate up to the base of the skull, negative on the left.    Neurological:    Alert and oriented to person, place, time, and situation.  Muscle strength against resistance:  Strength  Deltoids Triceps Biceps Wrist Extension Wrist Flexion Intrinsics   Upper: R 5/5 5/5 5/5 5/5  5/5    L 5/5 5/5 5/5 5/5  5/5   Sensation is intact to primary modalities in bilateral upper extremities.  Reflexes:   Right Left   Triceps 2+ 2+   Biceps 2+ 2+   Brachioradialis 1+ 1+   Lorenzo's sign is negative bilaterally.  Gait is normal.   Coordination is normal.    Imaging:  Cervical x-rays were obtained on 06/20/2023.  This study shows normal alignment.  There is disc space narrowing and spondylosis at C5-6.  There " is no abnormal motion with flexion or extension.  MRI of the cervical spine was obtained on 02/07/2023.  At C4-5, there is moderately to severe right foraminal narrowing secondary to uncinate and facet hypertrophy.  At C5-6, there is severe bilateral foraminal stenosis secondary to uncinate and facet hypertrophy.  This is to my reading.      ASSESSMENT:     1. Cervical spondylosis with radiculopathy  pregabalin (LYRICA) 75 MG capsule    CT Cervical Spine Without Contrast      2. Cervical radiculitis  Ambulatory referral/consult to Neurosurgery      3. DDD (degenerative disc disease), cervical  Ambulatory referral/consult to Neurosurgery      4. Cervicalgia  Ambulatory referral/consult to Neurosurgery         PLAN:     Options were discussed at length with the patient.  Options were discussed at length with the patient.  He has experienced persistent pain which is affecting his work and home life.  Discussed the possibility of disc replacement at C5-6.  He may also need foraminotomy on the right at C4-5.  We will obtain further testing to determine if he is an appropriate candidate.  He will return for follow-up with CT of the cervical spine as well as bone density study.  He would like to try Lyrica again to see if it can provide relief.  We also discussed home cervical traction.      A total of 34 minutes was spent face-to-face with the patient during this encounter.  Over half of that time was spent on counseling and coordination of care. An additional 10 minutes were used to reviewed the patient's chart including notes from Dr. Villanueva, cervical MRI and x-rays imaging and report, and work on office note.      Pat Porras PA-C

## 2023-06-28 ENCOUNTER — PATIENT MESSAGE (OUTPATIENT)
Dept: NEUROSURGERY | Facility: CLINIC | Age: 53
End: 2023-06-28
Payer: COMMERCIAL

## 2023-06-28 DIAGNOSIS — M47.22 CERVICAL SPONDYLOSIS WITH RADICULOPATHY: Primary | ICD-10-CM

## 2023-07-06 ENCOUNTER — HOSPITAL ENCOUNTER (OUTPATIENT)
Dept: RADIOLOGY | Facility: HOSPITAL | Age: 53
Discharge: HOME | End: 2023-07-06
Attending: PHYSICIAN ASSISTANT
Payer: COMMERCIAL

## 2023-07-06 DIAGNOSIS — M81.0 OSTEOPOROSIS: ICD-10-CM

## 2023-07-06 DIAGNOSIS — M47.22 CERVICAL SPONDYLOSIS WITH RADICULOPATHY: ICD-10-CM

## 2023-07-06 PROCEDURE — 77080 DXA BONE DENSITY AXIAL SKELETON 1 OR MORE SITES: ICD-10-PCS | Mod: 26,XU,, | Performed by: STUDENT IN AN ORGANIZED HEALTH CARE EDUCATION/TRAINING PROGRAM

## 2023-07-06 PROCEDURE — 77080 DXA BONE DENSITY AXIAL: CPT | Mod: XU,TC

## 2023-07-06 PROCEDURE — 77080 DXA BONE DENSITY AXIAL: CPT | Mod: 26,XU,, | Performed by: STUDENT IN AN ORGANIZED HEALTH CARE EDUCATION/TRAINING PROGRAM

## 2023-07-06 PROCEDURE — 77081 DXA BONE DENSITY APPENDICULR: CPT | Mod: 26,,, | Performed by: STUDENT IN AN ORGANIZED HEALTH CARE EDUCATION/TRAINING PROGRAM

## 2023-07-06 PROCEDURE — 77081 DEXA BONE DENSITY APPENDICULAR SKELETON: ICD-10-PCS | Mod: 26,,, | Performed by: STUDENT IN AN ORGANIZED HEALTH CARE EDUCATION/TRAINING PROGRAM

## 2023-07-06 PROCEDURE — 77081 DXA BONE DENSITY APPENDICULR: CPT | Mod: TC

## 2023-07-10 ENCOUNTER — PATIENT MESSAGE (OUTPATIENT)
Dept: NEUROSURGERY | Facility: CLINIC | Age: 53
End: 2023-07-10
Payer: COMMERCIAL

## 2023-07-11 NOTE — TELEPHONE ENCOUNTER
I spoke to the patient.  He had a left far lateral disk herniation at L4-5 at the time of the ALIF.  He was asymptomatic in regards to this HNP.  Therefore, that level was not included in the surgery.  He voiced understanding.  He has lower back pain that has been persistent.  However, he is able to manage with the LBP.  Lyrica has helped to decrease the cervical radiculopathy.  He will return for follow up on 7/19/2023.

## 2023-07-19 ENCOUNTER — OFFICE VISIT (OUTPATIENT)
Dept: NEUROSURGERY | Facility: CLINIC | Age: 53
End: 2023-07-19
Payer: COMMERCIAL

## 2023-07-19 VITALS
SYSTOLIC BLOOD PRESSURE: 115 MMHG | RESPIRATION RATE: 16 BRPM | DIASTOLIC BLOOD PRESSURE: 75 MMHG | BODY MASS INDEX: 27.49 KG/M2 | HEIGHT: 70 IN | HEART RATE: 74 BPM | WEIGHT: 192 LBS

## 2023-07-19 DIAGNOSIS — M47.22 CERVICAL SPONDYLOSIS WITH RADICULOPATHY: Primary | ICD-10-CM

## 2023-07-19 PROCEDURE — 99214 PR OFFICE/OUTPT VISIT, EST, LEVL IV, 30-39 MIN: ICD-10-PCS | Mod: ,,, | Performed by: NEUROLOGICAL SURGERY

## 2023-07-19 PROCEDURE — 1160F RVW MEDS BY RX/DR IN RCRD: CPT | Mod: CPTII,,, | Performed by: NEUROLOGICAL SURGERY

## 2023-07-19 PROCEDURE — 3074F PR MOST RECENT SYSTOLIC BLOOD PRESSURE < 130 MM HG: ICD-10-PCS | Mod: CPTII,,, | Performed by: NEUROLOGICAL SURGERY

## 2023-07-19 PROCEDURE — 1160F PR REVIEW ALL MEDS BY PRESCRIBER/CLIN PHARMACIST DOCUMENTED: ICD-10-PCS | Mod: CPTII,,, | Performed by: NEUROLOGICAL SURGERY

## 2023-07-19 PROCEDURE — 3078F PR MOST RECENT DIASTOLIC BLOOD PRESSURE < 80 MM HG: ICD-10-PCS | Mod: CPTII,,, | Performed by: NEUROLOGICAL SURGERY

## 2023-07-19 PROCEDURE — 3078F DIAST BP <80 MM HG: CPT | Mod: CPTII,,, | Performed by: NEUROLOGICAL SURGERY

## 2023-07-19 PROCEDURE — 1159F PR MEDICATION LIST DOCUMENTED IN MEDICAL RECORD: ICD-10-PCS | Mod: CPTII,,, | Performed by: NEUROLOGICAL SURGERY

## 2023-07-19 PROCEDURE — 1159F MED LIST DOCD IN RCRD: CPT | Mod: CPTII,,, | Performed by: NEUROLOGICAL SURGERY

## 2023-07-19 PROCEDURE — 3074F SYST BP LT 130 MM HG: CPT | Mod: CPTII,,, | Performed by: NEUROLOGICAL SURGERY

## 2023-07-19 PROCEDURE — 3008F BODY MASS INDEX DOCD: CPT | Mod: CPTII,,, | Performed by: NEUROLOGICAL SURGERY

## 2023-07-19 PROCEDURE — 3008F PR BODY MASS INDEX (BMI) DOCUMENTED: ICD-10-PCS | Mod: CPTII,,, | Performed by: NEUROLOGICAL SURGERY

## 2023-07-19 PROCEDURE — 99214 OFFICE O/P EST MOD 30 MIN: CPT | Mod: ,,, | Performed by: NEUROLOGICAL SURGERY

## 2023-07-19 RX ORDER — DICLOFENAC SODIUM 75 MG/1
75 TABLET, DELAYED RELEASE ORAL
COMMUNITY
Start: 2023-07-09

## 2023-07-19 RX ORDER — ATOMOXETINE 25 MG/1
CAPSULE ORAL 2 TIMES DAILY
COMMUNITY
Start: 2023-06-22 | End: 2023-09-26

## 2023-07-19 NOTE — PROGRESS NOTES
[facility]  History & Physical  Neurosurgery      Allen Esparza   [id number]   1970       SUBJECTIVE:     CHIEF COMPLAINT:  Right arm pain      HPI:  Allen Esparza is a 52 y.o. right hand dominant male who presents for follow up for cervical complaints.  The patient reports he has tightness in his neck, but it is not painful.  He complains more so of pain into the right trapezius muscle, shoulder, lateral upper arm, dorsal forearm through the 1st and 2nd fingers.  His symptoms began in August of 2022 after 4 days of playing golf.  While playing, his right arm went numb.  He saw improvement in the numbness after a few days.  However, he continued with pain and numbness in the right trapezius muscle.  He underwent right rotator cuff repair with Dr. Zurdo Massey.  Yet, he continued with pain through the arm.  He describes electrical shooting type pain through his right arm.  Previously, there was weakness in the right arm.  However he has been able to work in the gym to increase his strength.  If he sits up straight, he develops radiating pain into the right arm.  He has to keep his neck flexed or he will experience an increase in pain.  He had 1 session of physical therapy during which traction was used.  He chose not to return, doing the exercises on his own.  Cervical epidural injections provided improvement in his symptoms for only a few days.    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  He is unable to play a few days and row.  He is able to continue to play, but his score is not good.  In the past gabapentin made him feel stupid.  He has had some improvement in symptoms with Lyrica.  However, he remains limited in activity due to his pain.  He was given oral steroids for a sinus infection.  It helped to improve his symptoms some.  The patient denies disturbances in bowel or bladder function.  There is no difficulty with balance.     He has history of left L5-S1 decompression and ALIF with MIS  pedicle screws performed on 9/25/14.  He has constant, nagging pain in the center of the lower back.  He feels like it is over the L4-5 level.  He denies any radicular leg pain or numbness.  He has a hard time playing golf due to the lower back pain as well.  He feels like he will likely need something done at L4-5 at some point, but says his pain is not that severe yet.    Past Medical History:   Diagnosis Date    Arthritis     Cervical radiculitis     Cervical spondylosis with radiculopathy     Disc displacement, lumbar     Osteoporosis        Past Surgical History:   Procedure Laterality Date    BACK SURGERY      lumbar Fusion    ELBOW SURGERY Left     EPIDURAL STEROID INJECTION INTO CERVICAL SPINE      EPIDURAL STEROID INJECTION INTO CERVICAL SPINE N/A 02/23/2023    Procedure: INJECTION, STEROID, SPINE, CERVICAL, EPIDURAL C7-T1;  Surgeon: Lamar Villanueva MD;  Location: [facility] OR;  Service: Pain Management;  Laterality: N/A;    EPIDURAL STEROID INJECTION INTO CERVICAL SPINE N/A 04/13/2023    Procedure: INJECTION, STEROID, SPINE, CERVICAL, EPIDURAL C7 T1;  Surgeon: Lamar Villanueva MD;  Location: [facility] OR;  Service: Pain Management;  Laterality: N/A;  C7 T1    REPAIR OF SUPERIOR LABRAL ANTERIOR-TO-POSTERIOR (SLAP) TEAR OF SHOULDER Right 2021    ROTATOR CUFF REPAIR Right 2021    SHOULDER SURGERY Right     SURGICAL REMOVAL OF BONE SPUR      x2 right shoulder       Family History   Problem Relation Age of Onset    Diabetes Mother     Hypertension Mother     Stroke Mother     Diabetes Father     Hyperlipidemia Father     Hypertension Father        Social History     Socioeconomic History    Marital status:    Tobacco Use    Smoking status: Never    Smokeless tobacco: Never   Substance and Sexual Activity    Alcohol use: Not Currently     Alcohol/week: 3.0 standard drinks     Types: 1 Glasses of wine, 1 Cans of beer, 1 Shots of liquor per week     Comment: once a month    Drug use: Never    Sexual activity: Yes     "    Review of patient's allergies indicates:   Allergen Reactions    Wheat containing prod Shortness Of Breath and Tinitus        Current Outpatient Medications   Medication Instructions    atomoxetine (STRATTERA) 25 MG capsule 2 times daily    diclofenac (VOLTAREN) 75 mg, As needed (PRN)    fexofenadine-pseudoephedrine (ALLEGRA-D 24) 180-240 mg per 24 hr tablet 1 tablet, Oral, Daily    levocetirizine (XYZAL) 5 mg, Oral, Nightly    mometasone (NASONEX) 50 mcg/actuation nasal spray 2 sprays, As needed (PRN)    pregabalin (LYRICA) 75 mg, Oral, 2 times daily    tadalafiL (CIALIS) 5 mg, Oral, Daily        Review of Systems:    Review of Systems   Constitutional:  Negative for chills and fever.   HENT:  Negative for nosebleeds and sore throat.    Eyes:  Negative for pain and visual disturbance.   Respiratory:  Negative for cough, chest tightness and shortness of breath.    Cardiovascular:  Negative for chest pain.   Gastrointestinal:  Negative for diarrhea, nausea and vomiting.   Genitourinary:  Negative for difficulty urinating, dysuria and hematuria.   Musculoskeletal:  Positive for neck stiffness. Negative for gait problem and myalgias.   Skin:  Negative for rash.   Neurological:  Positive for numbness. Negative for dizziness, facial asymmetry and headaches.   Psychiatric/Behavioral:  Negative for confusion and sleep disturbance. The patient is not nervous/anxious.         OBJECTIVE:       Visit Vitals  /75 (BP Location: Right arm, Patient Position: Sitting)   Pulse 74   Resp 16   Ht 5' 10" (1.778 m)   Wt 87.1 kg (192 lb)   BMI 27.55 kg/m²        General:  Pleasant, Well-nourished, Well-groomed.    Lungs:  Breathing is quiet with non-labored respirations.    Abdomen:  Soft, non-tender, non-distended.    Musculoskeletal:  Cervical ROM:  Mildly limited with extension.  Tinel's is positive at bilateral elbows, negative at bilateral wrists.  Spurling's maneuver on the right causes pain to radiate up to the base of " the skull, negative on the left.    Neurological:    Alert and oriented to person, place, time, and situation.  Muscle strength against resistance:  Strength  Deltoids Triceps Biceps Wrist Extension Intrinsics   Upper: R 5/5 5/5 5/5 5/5 5/5    L 5/5 5/5 5/5 5/5 5/5   Sensation is intact to primary modalities in bilateral upper extremities.  Reflexes:   Right Left   Triceps 2+ 2+   Biceps 2+ 2+   Brachioradialis 1+ 1+   Lorenzo's sign is negative bilaterally.  Gait is normal.   Coordination is normal.    MRI of the cervical spine from 02/06/2023 shows spondylosis and moderately severe bilateral foraminal stenosis at C5-6.  Alignment is normal.  There is no abnormal motion.    ASSESSMENT/PLAN:     1. Cervical spondylosis with radiculopathy     - C5-6 total disc arthroplasty; late October/November per patient  - Follow up for preop unless patient has further questions or symptoms change    I, Dr. Jeremy Silva, personally performed the services described in this documentation. All medical record entries made by the scribe, Yuridia Vasques RN, were at my direction and in my presence.  I have reviewed the chart and agree that the record reflects my personal performance and is accurate and complete. Jeremy Silva MD.  11:25 AM 07/19/2023       Jeremy Silva MD [facility]

## 2023-10-02 ENCOUNTER — PATIENT MESSAGE (OUTPATIENT)
Dept: NEUROSURGERY | Facility: CLINIC | Age: 53
End: 2023-10-02
Payer: COMMERCIAL

## 2023-10-05 ENCOUNTER — OFFICE VISIT (OUTPATIENT)
Dept: NEUROSURGERY | Facility: CLINIC | Age: 53
End: 2023-10-05
Payer: COMMERCIAL

## 2023-10-05 VITALS
SYSTOLIC BLOOD PRESSURE: 116 MMHG | TEMPERATURE: 99 F | DIASTOLIC BLOOD PRESSURE: 79 MMHG | BODY MASS INDEX: 27.2 KG/M2 | WEIGHT: 190 LBS | RESPIRATION RATE: 16 BRPM | HEIGHT: 70 IN | HEART RATE: 65 BPM

## 2023-10-05 DIAGNOSIS — M47.22 CERVICAL SPONDYLOSIS WITH RADICULOPATHY: Primary | ICD-10-CM

## 2023-10-05 DIAGNOSIS — Z01.818 PREOPERATIVE TESTING: ICD-10-CM

## 2023-10-05 PROCEDURE — 99214 PR OFFICE/OUTPT VISIT, EST, LEVL IV, 30-39 MIN: ICD-10-PCS | Mod: ,,, | Performed by: PHYSICIAN ASSISTANT

## 2023-10-05 PROCEDURE — 1159F MED LIST DOCD IN RCRD: CPT | Mod: CPTII,,, | Performed by: PHYSICIAN ASSISTANT

## 2023-10-05 PROCEDURE — 3078F PR MOST RECENT DIASTOLIC BLOOD PRESSURE < 80 MM HG: ICD-10-PCS | Mod: CPTII,,, | Performed by: PHYSICIAN ASSISTANT

## 2023-10-05 PROCEDURE — 3074F PR MOST RECENT SYSTOLIC BLOOD PRESSURE < 130 MM HG: ICD-10-PCS | Mod: CPTII,,, | Performed by: PHYSICIAN ASSISTANT

## 2023-10-05 PROCEDURE — 3008F PR BODY MASS INDEX (BMI) DOCUMENTED: ICD-10-PCS | Mod: CPTII,,, | Performed by: PHYSICIAN ASSISTANT

## 2023-10-05 PROCEDURE — 1159F PR MEDICATION LIST DOCUMENTED IN MEDICAL RECORD: ICD-10-PCS | Mod: CPTII,,, | Performed by: PHYSICIAN ASSISTANT

## 2023-10-05 PROCEDURE — 1160F PR REVIEW ALL MEDS BY PRESCRIBER/CLIN PHARMACIST DOCUMENTED: ICD-10-PCS | Mod: CPTII,,, | Performed by: PHYSICIAN ASSISTANT

## 2023-10-05 PROCEDURE — 3008F BODY MASS INDEX DOCD: CPT | Mod: CPTII,,, | Performed by: PHYSICIAN ASSISTANT

## 2023-10-05 PROCEDURE — 3078F DIAST BP <80 MM HG: CPT | Mod: CPTII,,, | Performed by: PHYSICIAN ASSISTANT

## 2023-10-05 PROCEDURE — 3074F SYST BP LT 130 MM HG: CPT | Mod: CPTII,,, | Performed by: PHYSICIAN ASSISTANT

## 2023-10-05 PROCEDURE — 99214 OFFICE O/P EST MOD 30 MIN: CPT | Mod: ,,, | Performed by: PHYSICIAN ASSISTANT

## 2023-10-05 PROCEDURE — 1160F RVW MEDS BY RX/DR IN RCRD: CPT | Mod: CPTII,,, | Performed by: PHYSICIAN ASSISTANT

## 2023-10-05 RX ORDER — SUCRALFATE 1 G/1
1 TABLET ORAL DAILY
COMMUNITY
Start: 2023-09-05 | End: 2023-10-05

## 2023-10-05 RX ORDER — METHYLPHENIDATE 8.6 MG/1
1 TABLET, ORALLY DISINTEGRATING ORAL 2 TIMES DAILY
COMMUNITY
Start: 2023-09-25 | End: 2024-01-29

## 2023-10-05 RX ORDER — HYDROCODONE BITARTRATE AND ACETAMINOPHEN 7.5; 325 MG/1; MG/1
1 TABLET ORAL EVERY 4 HOURS PRN
Qty: 40 TABLET | Refills: 0 | Status: SHIPPED | OUTPATIENT
Start: 2023-10-05 | End: 2024-01-29

## 2023-10-05 RX ORDER — CYCLOBENZAPRINE HCL 10 MG
10 TABLET ORAL 3 TIMES DAILY PRN
Qty: 30 TABLET | Refills: 2 | Status: SHIPPED | OUTPATIENT
Start: 2023-10-05 | End: 2023-10-15

## 2023-10-05 RX ORDER — CLOMIPHENE CITRATE 50 MG/1
50 TABLET ORAL DAILY
COMMUNITY
Start: 2023-09-27

## 2023-10-05 RX ORDER — PANTOPRAZOLE SODIUM 20 MG/1
20 TABLET, DELAYED RELEASE ORAL DAILY
COMMUNITY
Start: 2023-09-05 | End: 2023-10-05

## 2023-10-05 NOTE — H&P (VIEW-ONLY)
[facility]  History & Physical  Neurosurgery      Allen Esparza   [id number]   1970       SUBJECTIVE:     CHIEF COMPLAINT:  Pain in the right trapezius muscle and arm      HPI:  Allen Esparza is a 52 y.o. male who presents for neurosurgical evaluation.  The patient reports he has tightness in his neck, but it is not painful.  He complains more so of pain into the right trapezius muscle, shoulder, lateral upper arm, dorsal forearm through the 1st and 2nd fingers.  The pain is described as burning in nature.  The symptoms are made worse with activity as well as missing a dose of Lyrica.  His symptoms began in August of 2022 after 4 days of playing golf.  While playing, his right arm went numb.  He saw improvement in the numbness after a few days.  However, he continued with pain and numbness in the right trapezius muscle.  He underwent right rotator cuff repair with Dr. Zurdo Massey.  Yet, he continued with pain through the arm.  He describes electrical shooting type pain through his right arm.  If he sits up straight, he develops radiating pain into the right arm.  He has to keep his neck flexed or he will experience an increase in pain.  Cervical epidural injections provided improvement in his symptoms for only a few days.  He is participating in an exercise program involving weight lifting and cardio work.  There is weakness in his right arm as compared to his left.  In addition, he is been experiencing a great deal of fatigue.  He is working with his physician who try to rectify.  Overall, the patient's symptoms are affecting his daily activities as well as work requirements.  He plays golf often due to work duties.  Doing so flares up his symptoms.  He denies disturbances in bowel or bladder function.  He does not have difficulties with his balance.  He is at a point where he has exhausted conservative measures.  He is interested in proceeding with surgery.      Past Medical History:    Diagnosis Date    ADHD     Arthritis     Burning sensation     right arm    Cervical radiculitis     Cervical spondylosis with radiculopathy     Disc displacement, lumbar     Exhaustion     Fatigue     PONV (postoperative nausea and vomiting)     Right arm numbness     Right carpal tunnel syndrome        Past Surgical History:   Procedure Laterality Date    COLONOSCOPY      ELBOW SURGERY Left     EPIDURAL STEROID INJECTION INTO CERVICAL SPINE      EPIDURAL STEROID INJECTION INTO CERVICAL SPINE N/A 02/23/2023    Procedure: INJECTION, STEROID, SPINE, CERVICAL, EPIDURAL C7-T1;  Surgeon: Lamar Villanueva MD;  Location: [facility] OR;  Service: Pain Management;  Laterality: N/A;    EPIDURAL STEROID INJECTION INTO CERVICAL SPINE N/A 04/13/2023    Procedure: INJECTION, STEROID, SPINE, CERVICAL, EPIDURAL C7 T1;  Surgeon: Lamar Villanueva MD;  Location: [facility] OR;  Service: Pain Management;  Laterality: N/A;  C7 T1    EPIDURAL STEROID INJECTION INTO LUMBAR SPINE      LUMBAR FUSION      MICRODISCECTOMY OF SPINE      REPAIR OF SUPERIOR LABRAL ANTERIOR-TO-POSTERIOR (SLAP) TEAR OF SHOULDER Right 2021    ROTATOR CUFF REPAIR Right 2021    SURGICAL REMOVAL OF BONE SPUR      x2 right shoulder    VASECTOMY         Family History   Problem Relation Age of Onset    Diabetes Mother     Hypertension Mother     Stroke Mother     Diabetes Father     Hyperlipidemia Father     Hypertension Father        Social History     Socioeconomic History    Marital status:    Tobacco Use    Smoking status: Never    Smokeless tobacco: Never   Substance and Sexual Activity    Alcohol use: Not Currently     Alcohol/week: 3.0 standard drinks of alcohol     Types: 1 Glasses of wine, 1 Cans of beer, 1 Shots of liquor per week     Comment: occasionally    Drug use: Never    Sexual activity: Yes        Review of patient's allergies indicates:   Allergen Reactions    Wheat containing prod Other (See Comments)     Rhinitis        Current Outpatient Medications  "  Medication Instructions    clomiPHENE (CLOMID) 50 mg, Oral, Daily    COTEMPLA XR-ODT 8.6 mg TbLB 1 tablet, Oral, 2 times daily    diclofenac (VOLTAREN) 75 mg, As needed (PRN)    fexofenadine-pseudoephedrine (ALLEGRA-D 24) 180-240 mg per 24 hr tablet 1 tablet, Oral, Daily    HYDROcodone-acetaminophen (NORCO) 7.5-325 mg per tablet 1 tablet, Oral, Every 4 hours PRN    levocetirizine (XYZAL) 5 mg, Oral, Nightly    mometasone (NASONEX) 50 mcg/actuation nasal spray 2 sprays, Nasal, Daily PRN    multivitamin capsule 1 capsule, Oral, Daily    pregabalin (LYRICA) 75 mg, Oral, 2 times daily    tadalafiL (CIALIS) 5 mg, Oral, As needed (PRN)        Review of Systems:    Review of Systems   Constitutional:  Positive for fatigue. Negative for chills and fever.   HENT:  Negative for nosebleeds and sore throat.    Eyes:  Negative for pain and visual disturbance.   Respiratory:  Negative for cough, chest tightness and shortness of breath.    Cardiovascular:  Negative for chest pain.   Gastrointestinal:  Negative for diarrhea, nausea and vomiting.   Genitourinary:  Negative for difficulty urinating, dysuria and hematuria.   Musculoskeletal:  Positive for neck pain and neck stiffness. Negative for gait problem and myalgias.   Skin:  Negative for rash.   Neurological:  Positive for weakness. Negative for dizziness, facial asymmetry, numbness and headaches.   Psychiatric/Behavioral:  Negative for confusion and sleep disturbance. The patient is not nervous/anxious.       12 point review of systems conducted, negative except as stated in the history of present illness. See HPI for details.      OBJECTIVE:       Visit Vitals  /79 (BP Location: Right arm, Patient Position: Sitting)   Pulse 65   Temp 98.6 °F (37 °C) (Oral)   Resp 16   Ht 5' 10" (1.778 m)   Wt 86.2 kg (190 lb)   BMI 27.26 kg/m²        General:  Pleasant, Well-nourished, Well-groomed.    CV:  Neck is supple.  There are no carotid bruits.  Heart has regular rate and " rhythm.    Lungs:  Lungs are clear to auscultation bilaterally with non-labored respirations.    Abdomen:  Soft, non-tender, non-distended.    Musculoskeletal:  Cervical ROM:  Mildly limited with extension.  Tinel's is positive at bilateral elbows, negative at bilateral wrists.  Spurling's maneuver on the right causes pain to radiate up to the base of the skull, negative on the left.     Neurological:    Alert and oriented to person, place, time, and situation.  Muscle strength against resistance:  Strength   Deltoids Triceps Biceps Wrist Extension Wrist Flexion Intrinsics   Upper: R 5/5 5/5 5/5 5/5   5/5     L 5/5 5/5 5/5 5/5   5/5   Sensation is intact to primary modalities in bilateral upper extremities.  Reflexes:    Right Left   Triceps 2+ 2+   Biceps 2+ 2+   Brachioradialis 1+ 1+   Lorenzo's sign is negative bilaterally.  Gait is normal.   Coordination is normal.     Imaging:  Cervical x-rays were obtained on 06/20/2023.  This study shows normal alignment.  There is disc space narrowing and spondylosis at C5-6.  There is no abnormal motion with flexion or extension.  MRI of the cervical spine was obtained on 02/07/2023.  At C4-5, there is moderately to severe right foraminal narrowing secondary to uncinate and facet hypertrophy.  At C5-6, there is severe bilateral foraminal stenosis secondary to uncinate and facet hypertrophy.  This is to my reading.      Imaging:  All pertinent neuroimaging independently reviewed. Discussed these findings in detail with the patient.      ASSESSMENT:     1. Cervical spondylosis with radiculopathy  EKG 12-lead    X-Ray Chest PA And Lateral    Comprehensive Metabolic Panel    CBC Auto Differential      2. Preoperative testing  EKG 12-lead    X-Ray Chest PA And Lateral    Comprehensive Metabolic Panel    CBC Auto Differential         PLAN:     Options were discussed at length with the patient.  Risks, benefits, and possible complications were discussed.  Due to the failure of  conservative measures in providing satisfactory relief, he would like to proceed with surgery.  Plan is for C5-6 total disc arthroplasty.  This is tentatively scheduled for 10/26/2023.  Consents were signed at this time.      A total of 25 minutes was spent face-to-face with the patient during this encounter.  Over half of that time was spent on counseling and coordination of care.  Additional time was used to reviewed the patient's chart including cervical MRI and x-ray images, and work on office note.      Pat Porras PA-C      Disclaimer:  This note is prepared using voice recognition software and as such is likely to have errors despite attempts at proofreading.

## 2023-10-05 NOTE — PATIENT INSTRUCTIONS
Stop Voltaren and multivitamin.  Last dose to be 10/18/2023.  Nothing to eat or drink after midnight the morning of surgery.  Take Lyrica the morning of surgery with a sip of water.

## 2023-10-05 NOTE — PROGRESS NOTES
[facility]  History & Physical  Neurosurgery      Allen Esparza   [id number]   1970       SUBJECTIVE:     CHIEF COMPLAINT:  Pain in the right trapezius muscle and arm      HPI:  Allen Esparza is a 52 y.o. male who presents for neurosurgical evaluation.  The patient reports he has tightness in his neck, but it is not painful.  He complains more so of pain into the right trapezius muscle, shoulder, lateral upper arm, dorsal forearm through the 1st and 2nd fingers.  The pain is described as burning in nature.  The symptoms are made worse with activity as well as missing a dose of Lyrica.  His symptoms began in August of 2022 after 4 days of playing golf.  While playing, his right arm went numb.  He saw improvement in the numbness after a few days.  However, he continued with pain and numbness in the right trapezius muscle.  He underwent right rotator cuff repair with Dr. Zrudo Massey.  Yet, he continued with pain through the arm.  He describes electrical shooting type pain through his right arm.  If he sits up straight, he develops radiating pain into the right arm.  He has to keep his neck flexed or he will experience an increase in pain.  Cervical epidural injections provided improvement in his symptoms for only a few days.  He is participating in an exercise program involving weight lifting and cardio work.  There is weakness in his right arm as compared to his left.  In addition, he is been experiencing a great deal of fatigue.  He is working with his physician who try to rectify.  Overall, the patient's symptoms are affecting his daily activities as well as work requirements.  He plays golf often due to work duties.  Doing so flares up his symptoms.  He denies disturbances in bowel or bladder function.  He does not have difficulties with his balance.  He is at a point where he has exhausted conservative measures.  He is interested in proceeding with surgery.      Past Medical History:    Diagnosis Date    ADHD     Arthritis     Burning sensation     right arm    Cervical radiculitis     Cervical spondylosis with radiculopathy     Disc displacement, lumbar     Exhaustion     Fatigue     PONV (postoperative nausea and vomiting)     Right arm numbness     Right carpal tunnel syndrome        Past Surgical History:   Procedure Laterality Date    COLONOSCOPY      ELBOW SURGERY Left     EPIDURAL STEROID INJECTION INTO CERVICAL SPINE      EPIDURAL STEROID INJECTION INTO CERVICAL SPINE N/A 02/23/2023    Procedure: INJECTION, STEROID, SPINE, CERVICAL, EPIDURAL C7-T1;  Surgeon: Lamar Villanueva MD;  Location: [facility] OR;  Service: Pain Management;  Laterality: N/A;    EPIDURAL STEROID INJECTION INTO CERVICAL SPINE N/A 04/13/2023    Procedure: INJECTION, STEROID, SPINE, CERVICAL, EPIDURAL C7 T1;  Surgeon: Lamar Villanueva MD;  Location: [facility] OR;  Service: Pain Management;  Laterality: N/A;  C7 T1    EPIDURAL STEROID INJECTION INTO LUMBAR SPINE      LUMBAR FUSION      MICRODISCECTOMY OF SPINE      REPAIR OF SUPERIOR LABRAL ANTERIOR-TO-POSTERIOR (SLAP) TEAR OF SHOULDER Right 2021    ROTATOR CUFF REPAIR Right 2021    SURGICAL REMOVAL OF BONE SPUR      x2 right shoulder    VASECTOMY         Family History   Problem Relation Age of Onset    Diabetes Mother     Hypertension Mother     Stroke Mother     Diabetes Father     Hyperlipidemia Father     Hypertension Father        Social History     Socioeconomic History    Marital status:    Tobacco Use    Smoking status: Never    Smokeless tobacco: Never   Substance and Sexual Activity    Alcohol use: Not Currently     Alcohol/week: 3.0 standard drinks of alcohol     Types: 1 Glasses of wine, 1 Cans of beer, 1 Shots of liquor per week     Comment: occasionally    Drug use: Never    Sexual activity: Yes        Review of patient's allergies indicates:   Allergen Reactions    Wheat containing prod Other (See Comments)     Rhinitis        Current Outpatient Medications  "  Medication Instructions    clomiPHENE (CLOMID) 50 mg, Oral, Daily    COTEMPLA XR-ODT 8.6 mg TbLB 1 tablet, Oral, 2 times daily    diclofenac (VOLTAREN) 75 mg, As needed (PRN)    fexofenadine-pseudoephedrine (ALLEGRA-D 24) 180-240 mg per 24 hr tablet 1 tablet, Oral, Daily    HYDROcodone-acetaminophen (NORCO) 7.5-325 mg per tablet 1 tablet, Oral, Every 4 hours PRN    levocetirizine (XYZAL) 5 mg, Oral, Nightly    mometasone (NASONEX) 50 mcg/actuation nasal spray 2 sprays, Nasal, Daily PRN    multivitamin capsule 1 capsule, Oral, Daily    pregabalin (LYRICA) 75 mg, Oral, 2 times daily    tadalafiL (CIALIS) 5 mg, Oral, As needed (PRN)        Review of Systems:    Review of Systems   Constitutional:  Positive for fatigue. Negative for chills and fever.   HENT:  Negative for nosebleeds and sore throat.    Eyes:  Negative for pain and visual disturbance.   Respiratory:  Negative for cough, chest tightness and shortness of breath.    Cardiovascular:  Negative for chest pain.   Gastrointestinal:  Negative for diarrhea, nausea and vomiting.   Genitourinary:  Negative for difficulty urinating, dysuria and hematuria.   Musculoskeletal:  Positive for neck pain and neck stiffness. Negative for gait problem and myalgias.   Skin:  Negative for rash.   Neurological:  Positive for weakness. Negative for dizziness, facial asymmetry, numbness and headaches.   Psychiatric/Behavioral:  Negative for confusion and sleep disturbance. The patient is not nervous/anxious.       12 point review of systems conducted, negative except as stated in the history of present illness. See HPI for details.      OBJECTIVE:       Visit Vitals  /79 (BP Location: Right arm, Patient Position: Sitting)   Pulse 65   Temp 98.6 °F (37 °C) (Oral)   Resp 16   Ht 5' 10" (1.778 m)   Wt 86.2 kg (190 lb)   BMI 27.26 kg/m²        General:  Pleasant, Well-nourished, Well-groomed.    CV:  Neck is supple.  There are no carotid bruits.  Heart has regular rate and " rhythm.    Lungs:  Lungs are clear to auscultation bilaterally with non-labored respirations.    Abdomen:  Soft, non-tender, non-distended.    Musculoskeletal:  Cervical ROM:  Mildly limited with extension.  Tinel's is positive at bilateral elbows, negative at bilateral wrists.  Spurling's maneuver on the right causes pain to radiate up to the base of the skull, negative on the left.     Neurological:    Alert and oriented to person, place, time, and situation.  Muscle strength against resistance:  Strength   Deltoids Triceps Biceps Wrist Extension Wrist Flexion Intrinsics   Upper: R 5/5 5/5 5/5 5/5   5/5     L 5/5 5/5 5/5 5/5   5/5   Sensation is intact to primary modalities in bilateral upper extremities.  Reflexes:    Right Left   Triceps 2+ 2+   Biceps 2+ 2+   Brachioradialis 1+ 1+   Lorenzo's sign is negative bilaterally.  Gait is normal.   Coordination is normal.     Imaging:  Cervical x-rays were obtained on 06/20/2023.  This study shows normal alignment.  There is disc space narrowing and spondylosis at C5-6.  There is no abnormal motion with flexion or extension.  MRI of the cervical spine was obtained on 02/07/2023.  At C4-5, there is moderately to severe right foraminal narrowing secondary to uncinate and facet hypertrophy.  At C5-6, there is severe bilateral foraminal stenosis secondary to uncinate and facet hypertrophy.  This is to my reading.      Imaging:  All pertinent neuroimaging independently reviewed. Discussed these findings in detail with the patient.      ASSESSMENT:     1. Cervical spondylosis with radiculopathy  EKG 12-lead    X-Ray Chest PA And Lateral    Comprehensive Metabolic Panel    CBC Auto Differential      2. Preoperative testing  EKG 12-lead    X-Ray Chest PA And Lateral    Comprehensive Metabolic Panel    CBC Auto Differential         PLAN:     Options were discussed at length with the patient.  Risks, benefits, and possible complications were discussed.  Due to the failure of  conservative measures in providing satisfactory relief, he would like to proceed with surgery.  Plan is for C5-6 total disc arthroplasty.  This is tentatively scheduled for 10/26/2023.  Consents were signed at this time.      A total of 25 minutes was spent face-to-face with the patient during this encounter.  Over half of that time was spent on counseling and coordination of care.  Additional time was used to reviewed the patient's chart including cervical MRI and x-ray images, and work on office note.      Pat Porras PA-C      Disclaimer:  This note is prepared using voice recognition software and as such is likely to have errors despite attempts at proofreading.

## 2023-10-20 ENCOUNTER — HOSPITAL ENCOUNTER (OUTPATIENT)
Dept: RADIOLOGY | Facility: HOSPITAL | Age: 53
Discharge: HOME | End: 2023-10-20
Attending: PHYSICIAN ASSISTANT
Payer: COMMERCIAL

## 2023-10-20 DIAGNOSIS — Z01.818 PREOPERATIVE TESTING: ICD-10-CM

## 2023-10-20 DIAGNOSIS — M47.22 CERVICAL SPONDYLOSIS WITH RADICULOPATHY: ICD-10-CM

## 2023-10-20 PROCEDURE — 71046 X-RAY EXAM CHEST 2 VIEWS: CPT | Mod: TC

## 2023-10-23 ENCOUNTER — PATIENT MESSAGE (OUTPATIENT)
Dept: ADMINISTRATIVE | Facility: OTHER | Age: 53
End: 2023-10-23
Payer: COMMERCIAL

## 2023-10-23 ENCOUNTER — ANESTHESIA EVENT (OUTPATIENT)
Dept: SURGERY | Facility: HOSPITAL | Age: 53
End: 2023-10-23
Payer: COMMERCIAL

## 2023-10-24 ENCOUNTER — PATIENT MESSAGE (OUTPATIENT)
Dept: ADMINISTRATIVE | Facility: OTHER | Age: 53
End: 2023-10-24
Payer: COMMERCIAL

## 2023-10-24 DIAGNOSIS — M47.22 CERVICAL SPONDYLOSIS WITH RADICULOPATHY: ICD-10-CM

## 2023-10-24 RX ORDER — PREGABALIN 75 MG/1
75 CAPSULE ORAL 2 TIMES DAILY
Qty: 180 CAPSULE | Refills: 0 | Status: SHIPPED | OUTPATIENT
Start: 2023-10-24 | End: 2024-01-29

## 2023-10-25 ENCOUNTER — PATIENT MESSAGE (OUTPATIENT)
Dept: ADMINISTRATIVE | Facility: OTHER | Age: 53
End: 2023-10-25
Payer: COMMERCIAL

## 2023-10-25 ENCOUNTER — TELEPHONE (OUTPATIENT)
Dept: NEUROSURGERY | Facility: CLINIC | Age: 53
End: 2023-10-25
Payer: COMMERCIAL

## 2023-10-25 DIAGNOSIS — M47.22 CERVICAL SPONDYLOSIS WITH RADICULOPATHY: Primary | ICD-10-CM

## 2023-10-25 NOTE — PRE-PROCEDURE INSTRUCTIONS
[facility]: Outpatient Surgery  Preprocedure Instructions    Your physician's office will be calling you with your arrival time.    You will arrive at [facility], [address], [place].  Enter through the [place] entrance next to the Emergency Room, and come to the 6th floor to the Outpatient Surgery Department.     Visitory Policy:  You are allowed 2 adult visitors to be with you in the hospital. Please, no switching visitors in pre-op area. All hospital visitors should be in good current health.  No small children.     What to Bring:  Please have your ID, insurance cards, and all home medication bottles with you at check in.  Bring your CPAP machine if one is used at home.     Fasting:  Nothing to eat or drink after midnight the night before your procedure. This includes no ice, gum, hard candies, and/or tobacco products.    Medications:  Follow your doctor's instructions for taking any medications on the morning of your procedure.  If no instructions for taking medications were given, do not take any medications but bring your medications in their bottles to your procedure check in.     Follow your doctor's preoperative instructions regarding skin prep, bowel prep, bathing, or showering prior to your procedure.  If any special soaps were provided to you, please use according to your doctor's instructions. If no instructions were given from your doctor, take a good bath or shower with antibacterial soap the night before and the morning of your procedure.  On the morning of procedure, wear loose, comfortable clothing.  No lotions, makeup, perfumes, colognes, deodorant, or jewelry to your procedure.  Removable items (glasses, contact lenses, dentures, retainers, hearing aids) need to be removed for your procedure.  Bring your storage containers for these items if you wear them.     Artificial nails, body jewelry, eyelash extensions, and/or hair extensions with metal  clips are not allowed during your surgery.  If you currently wear any of these items, please arrange for them to be removed prior to your arrival to the hospital.     Outpatient or Same Day Surgeries:  Any patients receiving sedation/anesthesia are advised not to drive for 24 hours after their procedure.  We do not allow patients to drive themselves home after discharge.  If you are going home after your procedure, please have someone available to drive you home from the hospital.        You may call the Outpatient Surgery Department at [phone] with any questions or concerns.  We are looking forward to meeting you and taking great care of you for your procedure.  Thank you for choosing [facility] for your surgical needs.

## 2023-10-26 ENCOUNTER — HOSPITAL ENCOUNTER (OUTPATIENT)
Facility: HOSPITAL | Age: 53
Discharge: HOME | End: 2023-10-26
Attending: NEUROLOGICAL SURGERY | Admitting: NEUROLOGICAL SURGERY
Payer: COMMERCIAL

## 2023-10-26 ENCOUNTER — ANESTHESIA (OUTPATIENT)
Dept: SURGERY | Facility: HOSPITAL | Age: 53
End: 2023-10-26
Payer: COMMERCIAL

## 2023-10-26 DIAGNOSIS — M47.22 CERVICAL SPONDYLOSIS WITH RADICULOPATHY: Primary | ICD-10-CM

## 2023-10-26 LAB
ABORH RETYPE: NORMAL
GROUP & RH: NORMAL
INDIRECT COOMBS GEL: NORMAL
SPECIMEN OUTDATE: NORMAL

## 2023-10-26 PROCEDURE — 71000016 HC POSTOP RECOV ADDL HR: Performed by: NEUROLOGICAL SURGERY

## 2023-10-26 PROCEDURE — 37000008 HC ANESTHESIA 1ST 15 MINUTES: Performed by: NEUROLOGICAL SURGERY

## 2023-10-26 PROCEDURE — 86900 BLOOD TYPING SEROLOGIC ABO: CPT | Performed by: ANESTHESIOLOGY

## 2023-10-26 PROCEDURE — D9220A PRA ANESTHESIA: Mod: ANES,,, | Performed by: ANESTHESIOLOGY

## 2023-10-26 PROCEDURE — 22856 PR TOTAL DISC ARTHROPLASTY, CERVICAL, SINGLE: ICD-10-PCS | Mod: ,,, | Performed by: NEUROLOGICAL SURGERY

## 2023-10-26 PROCEDURE — D9220A PRA ANESTHESIA: ICD-10-PCS | Mod: ANES,,, | Performed by: ANESTHESIOLOGY

## 2023-10-26 PROCEDURE — 63600175 PHARM REV CODE 636 W HCPCS: Performed by: NEUROLOGICAL SURGERY

## 2023-10-26 PROCEDURE — 25000003 PHARM REV CODE 250

## 2023-10-26 PROCEDURE — D9220A PRA ANESTHESIA: Mod: CRNA,,, | Performed by: STUDENT IN AN ORGANIZED HEALTH CARE EDUCATION/TRAINING PROGRAM

## 2023-10-26 PROCEDURE — 27201423 OPTIME MED/SURG SUP & DEVICES STERILE SUPPLY: Performed by: NEUROLOGICAL SURGERY

## 2023-10-26 PROCEDURE — 22856 TOT DISC ARTHRP 1NTRSPC CRV: CPT | Mod: ,,, | Performed by: NEUROLOGICAL SURGERY

## 2023-10-26 PROCEDURE — 37000009 HC ANESTHESIA EA ADD 15 MINS: Performed by: NEUROLOGICAL SURGERY

## 2023-10-26 PROCEDURE — 36000710: Performed by: NEUROLOGICAL SURGERY

## 2023-10-26 PROCEDURE — 71000015 HC POSTOP RECOV 1ST HR: Performed by: NEUROLOGICAL SURGERY

## 2023-10-26 PROCEDURE — 36000711: Performed by: NEUROLOGICAL SURGERY

## 2023-10-26 PROCEDURE — C1713 ANCHOR/SCREW BN/BN,TIS/BN: HCPCS | Performed by: NEUROLOGICAL SURGERY

## 2023-10-26 PROCEDURE — 36620 INSERTION CATHETER ARTERY: CPT | Mod: 59,,, | Performed by: ANESTHESIOLOGY

## 2023-10-26 PROCEDURE — 36620 INSERTION CATHETER ARTERY: CPT

## 2023-10-26 PROCEDURE — 63600175 PHARM REV CODE 636 W HCPCS

## 2023-10-26 PROCEDURE — 71000033 HC RECOVERY, INTIAL HOUR: Performed by: NEUROLOGICAL SURGERY

## 2023-10-26 PROCEDURE — D9220A PRA ANESTHESIA: ICD-10-PCS | Mod: CRNA,,, | Performed by: STUDENT IN AN ORGANIZED HEALTH CARE EDUCATION/TRAINING PROGRAM

## 2023-10-26 PROCEDURE — C1729 CATH, DRAINAGE: HCPCS | Performed by: NEUROLOGICAL SURGERY

## 2023-10-26 PROCEDURE — 36620 ARTERIAL: ICD-10-PCS | Mod: 59,,, | Performed by: ANESTHESIOLOGY

## 2023-10-26 DEVICE — IMPLANTABLE DEVICE: Type: IMPLANTABLE DEVICE | Site: SPINE CERVICAL | Status: FUNCTIONAL

## 2023-10-26 RX ORDER — ERYTHROMYCIN 5 MG/G
OINTMENT OPHTHALMIC EVERY 8 HOURS
Status: DISCONTINUED | OUTPATIENT
Start: 2023-10-26 | End: 2023-10-26 | Stop reason: HOSPADM

## 2023-10-26 RX ORDER — HYDROCODONE BITARTRATE AND ACETAMINOPHEN 5; 325 MG/1; MG/1
1 TABLET ORAL EVERY 4 HOURS PRN
Status: DISCONTINUED | OUTPATIENT
Start: 2023-10-26 | End: 2023-10-26 | Stop reason: HOSPADM

## 2023-10-26 RX ORDER — FENTANYL CITRATE 50 UG/ML
INJECTION, SOLUTION INTRAMUSCULAR; INTRAVENOUS
Status: DISCONTINUED | OUTPATIENT
Start: 2023-10-26 | End: 2023-10-26

## 2023-10-26 RX ORDER — FENTANYL CITRATE 50 UG/ML
25 INJECTION, SOLUTION INTRAMUSCULAR; INTRAVENOUS EVERY 5 MIN PRN
Status: DISCONTINUED | OUTPATIENT
Start: 2023-10-26 | End: 2023-10-26 | Stop reason: HOSPADM

## 2023-10-26 RX ORDER — DEXAMETHASONE SODIUM PHOSPHATE 4 MG/ML
INJECTION, SOLUTION INTRA-ARTICULAR; INTRALESIONAL; INTRAMUSCULAR; INTRAVENOUS; SOFT TISSUE
Status: DISCONTINUED | OUTPATIENT
Start: 2023-10-26 | End: 2023-10-26

## 2023-10-26 RX ORDER — METHOCARBAMOL 750 MG/1
750 TABLET, FILM COATED ORAL EVERY 8 HOURS PRN
Status: DISCONTINUED | OUTPATIENT
Start: 2023-10-26 | End: 2023-10-26 | Stop reason: HOSPADM

## 2023-10-26 RX ORDER — CEFAZOLIN SODIUM 2 G/50ML
2 SOLUTION INTRAVENOUS
Status: COMPLETED | OUTPATIENT
Start: 2023-10-26 | End: 2023-10-26

## 2023-10-26 RX ORDER — HYDROMORPHONE HYDROCHLORIDE 2 MG/ML
0.2 INJECTION, SOLUTION INTRAMUSCULAR; INTRAVENOUS; SUBCUTANEOUS EVERY 5 MIN PRN
Status: DISCONTINUED | OUTPATIENT
Start: 2023-10-26 | End: 2023-10-26 | Stop reason: HOSPADM

## 2023-10-26 RX ORDER — ACETAMINOPHEN 10 MG/ML
INJECTION, SOLUTION INTRAVENOUS
Status: DISCONTINUED | OUTPATIENT
Start: 2023-10-26 | End: 2023-10-26

## 2023-10-26 RX ORDER — SCOLOPAMINE TRANSDERMAL SYSTEM 1 MG/1
1 PATCH, EXTENDED RELEASE TRANSDERMAL
Status: CANCELLED | OUTPATIENT
Start: 2023-10-26

## 2023-10-26 RX ORDER — ACETAMINOPHEN 325 MG/1
325 TABLET ORAL EVERY 6 HOURS PRN
Status: DISCONTINUED | OUTPATIENT
Start: 2023-10-26 | End: 2023-10-26 | Stop reason: HOSPADM

## 2023-10-26 RX ORDER — HYDROCODONE BITARTRATE AND ACETAMINOPHEN 10; 325 MG/1; MG/1
1 TABLET ORAL EVERY 4 HOURS PRN
Status: DISCONTINUED | OUTPATIENT
Start: 2023-10-26 | End: 2023-10-26 | Stop reason: HOSPADM

## 2023-10-26 RX ORDER — ONDANSETRON HYDROCHLORIDE 2 MG/ML
INJECTION, SOLUTION INTRAMUSCULAR; INTRAVENOUS
Status: DISCONTINUED | OUTPATIENT
Start: 2023-10-26 | End: 2023-10-26

## 2023-10-26 RX ORDER — KETAMINE HYDROCHLORIDE 100 MG/ML
INJECTION, SOLUTION INTRAMUSCULAR; INTRAVENOUS
Status: DISCONTINUED | OUTPATIENT
Start: 2023-10-26 | End: 2023-10-26

## 2023-10-26 RX ORDER — DEXAMETHASONE SODIUM PHOSPHATE 4 MG/ML
4 INJECTION, SOLUTION INTRA-ARTICULAR; INTRALESIONAL; INTRAMUSCULAR; INTRAVENOUS; SOFT TISSUE
Status: CANCELLED | OUTPATIENT
Start: 2023-10-26 | End: 2023-10-26

## 2023-10-26 RX ORDER — FAMOTIDINE 10 MG/ML
20 INJECTION INTRAVENOUS ONCE
Status: CANCELLED | OUTPATIENT
Start: 2023-10-26

## 2023-10-26 RX ORDER — ONDANSETRON 4 MG/1
8 TABLET, ORALLY DISINTEGRATING ORAL EVERY 6 HOURS PRN
Status: DISCONTINUED | OUTPATIENT
Start: 2023-10-26 | End: 2023-10-26 | Stop reason: HOSPADM

## 2023-10-26 RX ORDER — PROPOFOL 10 MG/ML
VIAL (ML) INTRAVENOUS CONTINUOUS PRN
Status: DISCONTINUED | OUTPATIENT
Start: 2023-10-26 | End: 2023-10-26

## 2023-10-26 RX ORDER — SODIUM CHLORIDE 0.9 % (FLUSH) 0.9 %
10 SYRINGE (ML) INJECTION
Status: DISCONTINUED | OUTPATIENT
Start: 2023-10-26 | End: 2023-10-26 | Stop reason: HOSPADM

## 2023-10-26 RX ORDER — EPINEPHRINE 1 MG/ML
INJECTION, SOLUTION, CONCENTRATE INTRAVENOUS
Status: DISCONTINUED | OUTPATIENT
Start: 2023-10-26 | End: 2023-10-26 | Stop reason: HOSPADM

## 2023-10-26 RX ORDER — BUPIVACAINE HYDROCHLORIDE 5 MG/ML
INJECTION, SOLUTION EPIDURAL; INTRACAUDAL
Status: DISCONTINUED | OUTPATIENT
Start: 2023-10-26 | End: 2023-10-26 | Stop reason: HOSPADM

## 2023-10-26 RX ORDER — CEFAZOLIN SODIUM 1 G/3ML
INJECTION, POWDER, FOR SOLUTION INTRAMUSCULAR; INTRAVENOUS
Status: DISCONTINUED | OUTPATIENT
Start: 2023-10-26 | End: 2023-10-26 | Stop reason: HOSPADM

## 2023-10-26 RX ORDER — PROMETHAZINE HYDROCHLORIDE 25 MG/ML
INJECTION, SOLUTION INTRAMUSCULAR; INTRAVENOUS
Status: DISCONTINUED | OUTPATIENT
Start: 2023-10-26 | End: 2023-10-26

## 2023-10-26 RX ORDER — ACETAMINOPHEN 10 MG/ML
1000 INJECTION, SOLUTION INTRAVENOUS ONCE
Status: DISCONTINUED | OUTPATIENT
Start: 2023-10-26 | End: 2023-10-26 | Stop reason: HOSPADM

## 2023-10-26 RX ORDER — HYDROMORPHONE HYDROCHLORIDE 2 MG/ML
INJECTION, SOLUTION INTRAMUSCULAR; INTRAVENOUS; SUBCUTANEOUS
Status: DISCONTINUED | OUTPATIENT
Start: 2023-10-26 | End: 2023-10-26

## 2023-10-26 RX ORDER — ROCURONIUM BROMIDE 10 MG/ML
INJECTION, SOLUTION INTRAVENOUS
Status: DISCONTINUED | OUTPATIENT
Start: 2023-10-26 | End: 2023-10-26

## 2023-10-26 RX ORDER — LIDOCAINE HYDROCHLORIDE 10 MG/ML
1 INJECTION, SOLUTION EPIDURAL; INFILTRATION; INTRACAUDAL; PERINEURAL ONCE
Status: CANCELLED | OUTPATIENT
Start: 2023-10-26 | End: 2023-10-26

## 2023-10-26 RX ORDER — LIDOCAINE HYDROCHLORIDE 20 MG/ML
INJECTION, SOLUTION EPIDURAL; INFILTRATION; INTRACAUDAL; PERINEURAL
Status: DISCONTINUED | OUTPATIENT
Start: 2023-10-26 | End: 2023-10-26

## 2023-10-26 RX ORDER — MIDAZOLAM HYDROCHLORIDE 1 MG/ML
INJECTION INTRAMUSCULAR; INTRAVENOUS
Status: DISCONTINUED | OUTPATIENT
Start: 2023-10-26 | End: 2023-10-26

## 2023-10-26 RX ORDER — PROPOFOL 10 MG/ML
VIAL (ML) INTRAVENOUS
Status: DISCONTINUED | OUTPATIENT
Start: 2023-10-26 | End: 2023-10-26

## 2023-10-26 RX ADMIN — PROMETHAZINE HYDROCHLORIDE 25 MG: 25 INJECTION INTRAMUSCULAR; INTRAVENOUS at 07:10

## 2023-10-26 RX ADMIN — ONDANSETRON 4 MG: 2 INJECTION INTRAMUSCULAR; INTRAVENOUS at 09:10

## 2023-10-26 RX ADMIN — SUGAMMADEX 200 MG: 100 INJECTION, SOLUTION INTRAVENOUS at 09:10

## 2023-10-26 RX ADMIN — CEFAZOLIN SODIUM 2 G: 2 SOLUTION INTRAVENOUS at 07:10

## 2023-10-26 RX ADMIN — PROPOFOL 150 MG: 10 INJECTION, EMULSION INTRAVENOUS at 07:10

## 2023-10-26 RX ADMIN — DEXAMETHASONE SODIUM PHOSPHATE 4 MG: 4 INJECTION, SOLUTION INTRA-ARTICULAR; INTRALESIONAL; INTRAMUSCULAR; INTRAVENOUS; SOFT TISSUE at 07:10

## 2023-10-26 RX ADMIN — KETAMINE HYDROCHLORIDE 20 MG: 100 INJECTION, SOLUTION, CONCENTRATE INTRAMUSCULAR; INTRAVENOUS at 07:10

## 2023-10-26 RX ADMIN — PROPOFOL 100 MCG/KG/MIN: 10 INJECTION, EMULSION INTRAVENOUS at 07:10

## 2023-10-26 RX ADMIN — ACETAMINOPHEN 1000 MG: 10 INJECTION, SOLUTION INTRAVENOUS at 09:10

## 2023-10-26 RX ADMIN — FENTANYL CITRATE 100 MCG: 50 INJECTION, SOLUTION INTRAMUSCULAR; INTRAVENOUS at 07:10

## 2023-10-26 RX ADMIN — KETAMINE HYDROCHLORIDE 10 MG: 100 INJECTION, SOLUTION, CONCENTRATE INTRAMUSCULAR; INTRAVENOUS at 08:10

## 2023-10-26 RX ADMIN — MIDAZOLAM HYDROCHLORIDE 2 MG: 1 INJECTION, SOLUTION INTRAMUSCULAR; INTRAVENOUS at 06:10

## 2023-10-26 RX ADMIN — ROCURONIUM BROMIDE 50 MG: 10 SOLUTION INTRAVENOUS at 07:10

## 2023-10-26 RX ADMIN — HYDROMORPHONE HYDROCHLORIDE 0.4 MG: 2 INJECTION, SOLUTION INTRAMUSCULAR; INTRAVENOUS; SUBCUTANEOUS at 09:10

## 2023-10-26 RX ADMIN — REMIFENTANIL HYDROCHLORIDE 0.1 MCG/KG/MIN: 1 INJECTION, POWDER, LYOPHILIZED, FOR SOLUTION INTRAVENOUS at 07:10

## 2023-10-26 RX ADMIN — LIDOCAINE HYDROCHLORIDE 80 MG: 20 INJECTION, SOLUTION EPIDURAL; INFILTRATION; INTRACAUDAL; PERINEURAL at 07:10

## 2023-10-26 RX ADMIN — SODIUM CHLORIDE, SODIUM GLUCONATE, SODIUM ACETATE, POTASSIUM CHLORIDE AND MAGNESIUM CHLORIDE: 526; 502; 368; 37; 30 INJECTION, SOLUTION INTRAVENOUS at 07:10

## 2023-10-26 RX ADMIN — PHENYLEPHRINE HYDROCHLORIDE 10 MCG/MIN: 10 INJECTION INTRAVENOUS at 07:10

## 2023-10-26 NOTE — DISCHARGE INSTRUCTIONS
-NO driving and NO alcohol consumption for 24 hours and while taking narcotic pain medications.    -Keep incisions clean and dry for 48 hours. OK to shower afterwards. Do not tub bathe or submerge incision under water.    -NELSON DRAIN CARE: keep bulb FULLY compressed. Empty drain every 4 hours OR as needed OR when contents reached 20mls. RECORD time and outputs. CALL the office in AM with results.    -NO heavy lifting. DO not lift objects greater than 10lbs. Use caution when lifting, bending, pulling, pushing, etc.    -Monitor site for infection: redness, swelling, drainage/pus/foul odor, fever, chills.    -Report to your nearest ER AND/OR notify your provider if you experience any SUDDEN/SEVERE chest/abdominal pain, profound weakness, trouble breathing, uncontrolled pain/bleeding.    BLEEDING: If surgical site begins to bleed , contact your doctor or go to ER    NAUSEA: due to the anesthesia, you may experience nausea for up to 24 hours. If nausea and vomiting last longer, contact your doctor.     INFECTION:  watch for any signs or symptoms of infection such as chills, fever, redness or drainage at surgical site. Notify your doctor     PAIN : take your pain medications as directed. If the pain medications are not helping, notify your doctor.

## 2023-10-26 NOTE — BRIEF OP NOTE
[facility] - Periop Services  Brief Operative Note    Surgery Date: 10/26/2023     Surgeon(s) and Role:     * Jeremy Silva MD - Primary    Assisting Surgeon: None    Pre-op Diagnosis:  Cervical spondylosis with radiculopathy [M47.22]    Post-op Diagnosis:  Post-Op Diagnosis Codes:     * Cervical spondylosis with radiculopathy [M47.22]    Procedure(s) (LRB):  REPLACEMENT, INTERVERTEBRAL DISC, CERVICAL, TOTAL (N/A)  C5-6 (5mm large footprint ProDisc-C SK)    Anesthesia: General    Operative Findings: Satisfactory Nerve root decompression and positioning of the disc device    Estimated Blood Loss: * No values recorded between 10/26/2023  8:06 AM and 10/26/2023  9:42 AM *  50cc    Specimens:   Specimen (24h ago, onward)      None              Discharge Note    OUTCOME: Patient tolerated treatment/procedure well without complication and is now ready for discharge.    DISPOSITION: Home or Self Care    FINAL DIAGNOSIS:  <principal problem not specified>    FOLLOWUP: In clinic    DISCHARGE INSTRUCTIONS:  No discharge procedures on file.

## 2023-10-26 NOTE — ANESTHESIA PREPROCEDURE EVALUATION
10/26/2023  Allen Esparza is a 52 y.o., male.      Pre-op Assessment    I have reviewed the Patient Summary Reports.     I have reviewed the Nursing Notes. I have reviewed the NPO Status.   I have reviewed the Medications.     Review of Systems  Anesthesia Hx:  PONV   Social:  Non-Smoker    Cardiovascular:   Denies Hypertension.  Denies MI.    Denies Angina.  Denies CHF.    Pulmonary:   Denies COPD.  Denies Asthma.    Renal/:   Denies Chronic Renal Disease. no renal calculi     Hepatic/GI:   Denies GERD. Denies Liver Disease.  Denies Hepatitis.    Neurological:   Denies CVA. Denies Seizures. Right arm numbness responsive to lyrica   Endocrine:   Denies Diabetes. Denies Hypothyroidism. Denies Hyperthyroidism.  Denies Obesity / BMI > 30  Psych:   Psychiatric History (ADHD)          Physical Exam  General: Well nourished, Cooperative, Alert and Oriented    Airway:  Mallampati: I   Mouth Opening: Normal  TM Distance: Normal  Tongue: Normal  Neck ROM: Normal ROM    Dental:  Intact        Anesthesia Plan  Type of Anesthesia, risks & benefits discussed:    Anesthesia Type: Gen ETT  Intra-op Monitoring Plan: Standard ASA Monitors and Art Line  Post Op Pain Control Plan: multimodal analgesia  Induction:  IV  Airway Plan: Video  Informed Consent: Informed consent signed with the Patient and all parties understand the risks and agree with anesthesia plan.  All questions answered. Patient consented to blood products? Yes  ASA Score: 2  Day of Surgery Review of History & Physical: H&P Update referred to the surgeon/provider.    Ready For Surgery From Anesthesia Perspective.     .

## 2023-10-26 NOTE — INTERVAL H&P NOTE
I have seen the patient and reviewed this note, and there is no change in history and physical since the last exam.  PROCEDURE:   CT scan of the abdomen and pelvis with and without IV contrast.

 

CLINICAL INDICATION:   Abdominal pain in a 19-year-old female.

 

TECHNIQUE:   Thin section axial, coronal and sagittal images were performed through the abdomen and 
pelvis without contrast and then following the injection of 90 cc of Omnipaque-300. Low-dose protoco
l imaging was utilized.

 

 One or more of the following dose reduction techniques were used:

- Automated exposure control.

- Adjustment of the mA and/or kV according to patient size.

Use of iterative reconstruction technique.

 

 

Radiation Dose: CTDI: 28.9 and DLP: 1810. 

 

COMPARISON:   No. 

 

FINDINGS:

Lungs and pleural space: Normal.

 

Heart: Normal.

 

The liver, common bile duct and gallbladder: There is diffuse fatty infiltration of the liver which 
measures 16.5 cm AP.  The liver is otherwise unremarkable.  The gallbladder and gallbladder wall are
 normal.

 

Pancreas: Normal.  The extrahepatic common bile duct is normal.

 

Gastrointestinal: The stomach and small bowel loops are unremarkable.  There is a tiny umbilical her
floresita containing fat.  The vermiform appendix is normal.  There is no evidence of an appendicolith.

 

Kidneys and bladder : Normal. 

 

Adrenal glands: Normal.

 

Spleen: Normal.

 

Lymph nodes: Normal.

 

Reproductive system: There is a large left adnexal cyst measuring 7.9 cm AP by 12.4 cm in height by 
13.1 cm transverse.

 

There is an elliptical solid mass ventral to the uterus measuring 8.1 x 4.6 by proximally 4.7 cm in 
height. This mass could be an ovary and the adjacent left adnexal cyst may or may not be contiguous 
with this mass.  A second structure possibly representing the left ovary measuring 2.5 by 2.6 by abo
ut 3.5 cm in size is noted in the left adnexal area which may represent the left ovary.

 

The uterus is unremarkable.  Right left inguinal areas are unremarkable. No free fluid is noted in t
he pelvis.

 

Bony elements: Normal.

 

Vasculature: Normal.

 

 

IMPRESSION:

 

1.  There is a large left adnexal cyst measuring 7.9 x 12.4 x 13.1 cm which lies adjacent to or may 
arise from an elliptical solid mass potentially representing the right ovary measuring 8.6 x 4.6 x 4
.7 cm. A pelvic sonogram is recommended for further characterization. An ovarian torsion should be e
xcluded. No free fluid is present in the pelvis.

2.  Hepatomegaly with fatty infiltration of the liver.

3.  Normal vermiform appendix.  No evidence of diverticulosis or diverticulitis.

4.  Obesity.

5.  Findings were phoned to Dr. Ashley Egan immediately following interpretation of the study. 

 

RPTAT:AAJJ

_____________________________________________ 

Physician Marina           Date    Time 

Electronically viewed and signed by Isauro Velásquez Physician on 04/01/2017 09:16 

 

D:  04/01/2017 09:16  T:  04/01/2017 09:16

/

## 2023-10-26 NOTE — PLAN OF CARE
Dc instructions provided to pt/fm at bedside  Questions answered  Verbalized understanding regarding  BLEEDING: If surgical site begins to bleed , contact your doctor or go to ER    NAUSEA: due to the anesthesia, you may experience nausea for up to 24 hours. If nausea and vomiting last longer, contact your doctor.     INFECTION:  watch for any signs or symptoms of infection such as chills, fever, redness or drainage at surgical site. Notify your doctor     PAIN : take your pain medications as directed. If the pain medications are not helping, notify your doctor.

## 2023-10-26 NOTE — ANESTHESIA PROCEDURE NOTES
Intubation    Date/Time: 10/26/2023 7:25 AM    Performed by: Lana Alvarado  Authorized by: Cisco Vega DO    Intubation:     Induction:  Intravenous    Intubated:  Postinduction    Mask Ventilation:  Easy mask    Attempts:  1    Attempted By:  Student    Method of Intubation:  Direct    Blade:  Willett 3    Laryngeal View Grade: Grade I - full view of cords      Difficult Airway Encountered?: No      Complications:  None    Airway Device:  Oral endotracheal tube    Airway Device Size:  7.5    Style/Cuff Inflation:  Cuffed (inflated to minimal occlusive pressure)    Tube secured:  22    Secured at:  The lips    Placement Verified By:  Capnometry    Complicating Factors:  None    Findings Post-Intubation:  BS equal bilateral and atraumatic/condition of teeth unchanged

## 2023-10-26 NOTE — ANESTHESIA POSTPROCEDURE EVALUATION
Anesthesia Post Evaluation    Patient: Allen Esparza    Procedure(s) Performed: Procedure(s) (LRB):  REPLACEMENT, INTERVERTEBRAL DISC, CERVICAL, TOTAL (N/A)    Final Anesthesia Type: general      Patient location during evaluation: PACU  Patient participation: Yes- Able to Participate  Level of consciousness: awake and alert  Post-procedure vital signs: reviewed and stable  Pain management: adequate  Airway patency: patent    PONV status at discharge: No PONV  Anesthetic complications: no      Cardiovascular status: blood pressure returned to baseline  Respiratory status: spontaneous ventilation and unassisted  Hydration status: euvolemic  Follow-up needed           Vitals Value Taken Time   /75 10/26/23 1416   Temp 36.7 °C (98.1 °F) 10/26/23 1103   Pulse 67 10/26/23 1416   Resp 18 10/26/23 1416   SpO2 97 % 10/26/23 1416         Event Time   Out of Recovery 10:40:00         Pain/Quirino Score: Quirino Score: 10 (10/26/2023  2:20 PM)  Modified Quirino Score: 20 (10/26/2023  2:20 PM)

## 2023-10-26 NOTE — OP NOTE
DATE OF OPERATION:   October 26, 2023     PREOPERATIVE DIAGNOSIS:   1. Cervical spondylosis with radiculopathy     POSTOPERATIVE DIAGNOSIS:   1. Cervical spondylosis with radiculopathy     SURGEON:  Jeremy Silva M.D.    ASSISTANT: TRINO     PROCEDURE:   1.  C5-6 disc arthroplasty with the ProDisc-C SK Total Disc Replacement system (5mm, large footprint implant)   2.  Microdissection for spinal procedure     ANESTHESIA:   General endotracheal     BLOOD LOSS:   50 cc     SPECIMEN(s):   Disc     COMPLICATIONS:  None    DRAIN:  NELSON x 1     HISTORY:   The patient is a 52-year-old gentleman with R side neck/shoulder/arm pain and numbness since August of 2022. Imaging studies show focal spondylosis and foraminal stenosis at C5-6. Options were discussed and, after conservative management failed, surgery was elected. The patient understood and accepted the nature of this surgery as well as its attendant risks.     FINDINGS:   There were no untoward findings.  As expected, there was significant disc degeneration and spondylosis at this level with significant foraminal narrowing.  His complete nerve root decompression at the completion of the procedure.  Proximally 80% uncinatectomy on each side was carried out.  There was excellent fit of the disc device at the completion of the procedure.    PROCEDURE IN DETAIL:   After endotracheal intubation and induction of general anesthesia, the patient's head was placed on a doughnut and a roll was placed under the shoulders. Intraoperative neuromonitoring electrodes were put into place, and both EMG, SSEP and, when appropriate, motor evoked potential monitoring were carried out continuously throughout the procedure. The neck was prepped and draped in the usual fashion.  The patient received intravenous antibiotics prior to the start of the procedure. Intraoperative C-arm fluoroscopy was used to both localize the incision and guide the placement of the graft and hardware. An incision  was marked out at the appropriate location and infiltrated with local anesthetic containing epinephrine. The incision was carried down through the skin and subcutaneous tissues with a knife. Unipolar cautery was used to incise the platysma. Circumferential subplatysmal dissection was carried out to allow better retraction. Then the fascial sheath was divided sharply, and sharp and blunt dissection were carried out medial to the sternocleidomastoid and carotid sheath and lateral to the trachea and esophagus down to the level of the prevertebral fascia.  After confirming correct interspace localization at C5-6, the longus colli musculature was undercut bilaterally at the operative levels. The self-retaining retractor system was put into place, and the air was released from the endotracheal tube cuff to minimize pressure along the recurrent laryngeal nerve.       The operating microscope was brought into place. The disc was incised sharply with a knife. Care was taken to wax any exposed bone above and below the incident level. Disc was removed with curettes and pituitary rongeurs down to the posterior endplates. The posterior longitudinal ligament was released, and then a balanced foraminal decompression was carried out with attempt to preserve as much of the uncinate process as possible. An appropriate sized trial was put into place nearly flush with the posterior edge of the endplate and an AP x-ray was taken to ensure midline placement and maximal coverage of the interspace.  Then the appropriate sized drill guide was put into place and keel cuts were made in the caudal and rostral vertebral bodies.  Then the chisel was used down the same keel cuts.  Then the appropriate sized implant was tapped into place and positioned optimally.  The inserting device was removed and AP and lateral x-rays were taken to confirm appropriate placement. The ventral portion of the keel cuts and any other areas of raw bone surface were  waxed.     The wound was irrigated copiously with antibiotic irrigation followed by irrigation with half-strength peroxide.  The wound was then closed with 3-0 Vicryl for the platysma and a running 4-0 Monocryl suture for the subcuticular. Dermabond glue was used for the skin edges. The patient was then taken to the post-anesthetic care unit in satisfactory condition with correct sponge and needle counts.

## 2023-10-26 NOTE — TRANSFER OF CARE
"Anesthesia Transfer of Care Note    Patient: Aleln Esparza    Procedure(s) Performed: Procedure(s) (LRB):  REPLACEMENT, INTERVERTEBRAL DISC, CERVICAL, TOTAL (N/A)    Patient location: PACU    Anesthesia Type: general    Transport from OR: Transported from OR on room air with adequate spontaneous ventilation    Post pain: adequate analgesia    Post assessment: no apparent anesthetic complications    Post vital signs: stable    Level of consciousness: sedated    Nausea/Vomiting: no nausea/vomiting    Complications: none    Transfer of care protocol was followed      Last vitals:   Visit Vitals  /76 (BP Location: Left arm, Patient Position: Sitting)   Pulse 61   Temp 36.7 °C (98 °F) (Oral)   Resp 18   Ht 5' 10" (1.778 m)   Wt 83 kg (182 lb 15.7 oz)   SpO2 96%   BMI 26.26 kg/m²     "

## 2023-10-26 NOTE — ANESTHESIA PROCEDURE NOTES
Arterial    Diagnosis: Cervical spondylosis with radiculopathy [M47.22]  Doctor requesting consult: Ricardo    Patient location during procedure: done out of OR  Procedure start time: 10/26/2023 6:56 AM  Timeout: 10/26/2023 6:55 AM  Procedure end time: 10/26/2023 7:01 AM    Staffing  Authorizing Provider: Cisco Vega DO  Performing Provider: Lana Alvarado    Staffing  Performed by: Lana Alvarado  Authorized by: Cisco Vega DO    Anesthesiologist was present at the time of the procedure.    Preanesthetic Checklist  Completed: patient identified, IV checked, site marked, risks and benefits discussed, surgical consent, monitors and equipment checked, pre-op evaluation, timeout performed and anesthesia consent givenArterial  Skin Prep: chlorhexidine gluconate  Local Infiltration: lidocaine  Orientation: right  Location: radial    Catheter Size: 20 G  Catheter placement by Anatomical landmarks. Heme positive aspiration all ports. Insertion Attempts: 1  Assessment  Dressing: secured with tape and tegaderm  Patient: Tolerated well

## 2023-10-27 ENCOUNTER — TELEPHONE (OUTPATIENT)
Dept: NEUROSURGERY | Facility: CLINIC | Age: 53
End: 2023-10-27
Payer: COMMERCIAL

## 2023-10-27 NOTE — TELEPHONE ENCOUNTER
----- Message from Jeremy Silva MD sent at 10/22/2023  9:04 AM CDT -----  Regarding: RE: TDR 10/26/23  Yes  ----- Message -----  From: Yuridia Franco LPN  Sent: 10/20/2023   1:24 PM CDT  To: Jeremy Silva MD  Subject: TDR 10/26/23                                     He is scheduled for C5-6 TDR 10/26. Prodisc?

## 2023-10-30 VITALS
WEIGHT: 183 LBS | HEART RATE: 67 BPM | SYSTOLIC BLOOD PRESSURE: 122 MMHG | DIASTOLIC BLOOD PRESSURE: 75 MMHG | TEMPERATURE: 98 F | RESPIRATION RATE: 18 BRPM | HEIGHT: 70 IN | BODY MASS INDEX: 26.2 KG/M2 | OXYGEN SATURATION: 97 %

## 2023-11-01 ENCOUNTER — TELEPHONE (OUTPATIENT)
Dept: NEUROSURGERY | Facility: CLINIC | Age: 53
End: 2023-11-01
Payer: COMMERCIAL

## 2023-11-01 NOTE — TELEPHONE ENCOUNTER
----- Message from Bertha Hobbs MA sent at 10/30/2023  8:34 AM CDT -----  Regarding: drainage    ----- Message -----  From: Aisha Henson  Sent: 10/27/2023   3:28 PM CDT  To: [facility]ardo VIZCAINO Staff    Type:  Patient Returning Call    Who Called: Pt  Who Left Message for Patient:  Does the patient know what this is regarding?:  Would the patient rather a call back or a response via [facility]? Call  Best Call Back Number: [phone]  Additional Information: Pt states Post-Op instructions indicate that he call office related to drain usage and proper care.

## 2023-11-01 NOTE — TELEPHONE ENCOUNTER
We received this message Monday, 10/30. I spoke with the patient yesterday. Ok to pull drain since it was not draining anymore. He reported to be doing well.

## 2023-11-08 ENCOUNTER — HOSPITAL ENCOUNTER (OUTPATIENT)
Dept: RADIOLOGY | Facility: HOSPITAL | Age: 53
Discharge: HOME | End: 2023-11-08
Attending: PHYSICIAN ASSISTANT
Payer: COMMERCIAL

## 2023-11-08 ENCOUNTER — OFFICE VISIT (OUTPATIENT)
Dept: NEUROSURGERY | Facility: CLINIC | Age: 53
End: 2023-11-08
Payer: COMMERCIAL

## 2023-11-08 VITALS
HEART RATE: 78 BPM | TEMPERATURE: 99 F | BODY MASS INDEX: 27.06 KG/M2 | WEIGHT: 189 LBS | RESPIRATION RATE: 18 BRPM | SYSTOLIC BLOOD PRESSURE: 126 MMHG | DIASTOLIC BLOOD PRESSURE: 79 MMHG | HEIGHT: 70 IN

## 2023-11-08 DIAGNOSIS — M47.22 CERVICAL SPONDYLOSIS WITH RADICULOPATHY: ICD-10-CM

## 2023-11-08 DIAGNOSIS — M47.22 CERVICAL SPONDYLOSIS WITH RADICULOPATHY: Primary | ICD-10-CM

## 2023-11-08 PROCEDURE — 99024 POSTOP FOLLOW-UP VISIT: CPT | Mod: ,,, | Performed by: NEUROLOGICAL SURGERY

## 2023-11-08 PROCEDURE — 72040 X-RAY EXAM NECK SPINE 2-3 VW: CPT | Mod: TC

## 2023-11-08 PROCEDURE — 3074F SYST BP LT 130 MM HG: CPT | Mod: CPTII,,, | Performed by: NEUROLOGICAL SURGERY

## 2023-11-08 PROCEDURE — 1159F MED LIST DOCD IN RCRD: CPT | Mod: CPTII,,, | Performed by: NEUROLOGICAL SURGERY

## 2023-11-08 PROCEDURE — 1160F RVW MEDS BY RX/DR IN RCRD: CPT | Mod: CPTII,,, | Performed by: NEUROLOGICAL SURGERY

## 2023-11-08 PROCEDURE — 3078F DIAST BP <80 MM HG: CPT | Mod: CPTII,,, | Performed by: NEUROLOGICAL SURGERY

## 2023-11-08 NOTE — PROGRESS NOTES
[facility]  Neurosurgery        Allen Esparza   [id number]   1970       SUBJECTIVE:       CHIEF COMPLAINT:    2 week post-op    HPI:    Allen Esparza is a 53 y.o.-year-old male who presents today for post-operative follow-up.  He is s/p C5-6 TDR that was done on 10/26/23.  He complains of tightness in the right interscapular and trapezius region.  He has some intermittent numbness in the right arm in various locations.  He says it is mainly positional and is much less severe than what he experienced prior to surgery.  He feels his range of motion has improved.  He says it feels smoother when turning his head.  He continues to be limited with left rotation due to the tightness in the right neck and interscapular region.  Overall he is much improved compared to preoperative.  He rates his pain at a 3/10 today.  He feels he continues to gradually improve with time.        Review of patient's allergies indicates:   Allergen Reactions    Wheat containing prod Other (See Comments)     Rhinitis     Current Outpatient Medications   Medication Sig Dispense Refill    clomiPHENE (CLOMID) 50 mg tablet Take 50 mg by mouth once daily.      COTEMPLA XR-ODT 8.6 mg TbLB Take 1 tablet by mouth 2 (two) times daily.      fexofenadine-pseudoephedrine (ALLEGRA-D 24) 180-240 mg per 24 hr tablet Take 1 tablet by mouth once daily.      levocetirizine (XYZAL) 5 MG tablet Take 5 mg by mouth every evening.      mometasone (NASONEX) 50 mcg/actuation nasal spray 2 sprays by Nasal route daily as needed.      multivitamin capsule Take 1 capsule by mouth once daily.      pregabalin (LYRICA) 75 MG capsule Take 1 capsule (75 mg total) by mouth 2 (two) times daily. 180 capsule 0    tadalafiL (CIALIS) 5 MG tablet Take 5 mg by mouth as needed.      diclofenac (VOLTAREN) 75 MG EC tablet 75 mg as needed.      HYDROcodone-acetaminophen (NORCO) 7.5-325 mg per tablet Take 1 tablet by mouth every 4 (four) hours as needed for Pain.  (Patient not taking: Reported on 11/8/2023) 40 tablet 0     No current facility-administered medications for this visit.     Past Medical History:   Diagnosis Date    ADHD     Arthritis     Burning sensation     right arm    Cervical radiculitis     Cervical spondylosis with radiculopathy     Disc displacement, lumbar     Exhaustion     Fatigue     PONV (postoperative nausea and vomiting)     Right arm numbness     Right carpal tunnel syndrome      Past Surgical History:   Procedure Laterality Date    COLONOSCOPY      ELBOW SURGERY Left     EPIDURAL STEROID INJECTION INTO CERVICAL SPINE      EPIDURAL STEROID INJECTION INTO CERVICAL SPINE N/A 02/23/2023    Procedure: INJECTION, STEROID, SPINE, CERVICAL, EPIDURAL C7-T1;  Surgeon: Laamr Villanueva MD;  Location: [facility];  Service: Pain Management;  Laterality: N/A;    EPIDURAL STEROID INJECTION INTO CERVICAL SPINE N/A 04/13/2023    Procedure: INJECTION, STEROID, SPINE, CERVICAL, EPIDURAL C7 T1;  Surgeon: Lamar Villanueva MD;  Location: [facility] OR;  Service: Pain Management;  Laterality: N/A;  C7 T1    EPIDURAL STEROID INJECTION INTO LUMBAR SPINE      LUMBAR FUSION      MICRODISCECTOMY OF SPINE      REPAIR OF SUPERIOR LABRAL ANTERIOR-TO-POSTERIOR (SLAP) TEAR OF SHOULDER Right 2021    ROTATOR CUFF REPAIR Right 2021    SURGICAL REMOVAL OF BONE SPUR      x2 right shoulder    TOTAL REPLACEMENT OF CERVICAL INTERVERTEBRAL DISC N/A 10/26/2023    Procedure: REPLACEMENT, INTERVERTEBRAL DISC, CERVICAL, TOTAL;  Surgeon: Jeremy Silva MD;  Location: [facility];  Service: Neurosurgery;  Laterality: N/A;  C5-6 TDR, ACDF if necessary  NTI    VASECTOMY       Family History       Problem Relation (Age of Onset)    Diabetes Mother, Father    Hyperlipidemia Father    Hypertension Mother, Father    Stroke Mother          Social History     Socioeconomic History    Marital status:    Tobacco Use    Smoking status: Never    Smokeless tobacco: Never   Substance and Sexual Activity     "Alcohol use: Not Currently     Alcohol/week: 3.0 standard drinks of alcohol     Types: 1 Glasses of wine, 1 Cans of beer, 1 Shots of liquor per week     Comment: occasionally    Drug use: Never    Sexual activity: Yes         Review of systems:  Constitutional:  Positive for fatigue. Negative for chills and fever.   HENT:  Negative for nosebleeds and sore throat.    Eyes:  Negative for pain and visual disturbance.   Respiratory:  Negative for cough, chest tightness and shortness of breath.    Cardiovascular:  Negative for chest pain.   Gastrointestinal:  Negative for diarrhea, nausea and vomiting.   Genitourinary:  Negative for difficulty urinating, dysuria and hematuria.   Musculoskeletal:  Positive for neck pain and neck stiffness. Negative for gait problem and myalgias.   Skin:  Negative for rash.   Neurological:  Positive for weakness. Negative for dizziness, facial asymmetry, numbness and headaches.   Psychiatric/Behavioral:  Negative for confusion and sleep disturbance. The patient is not nervous/anxious.        OBJECTIVE:       Vital Signs  Temp: 98.5 °F (36.9 °C)  Pulse: 78  Resp: 18  BP: 126/79  Pain Score:   3  Height: 5' 10" (177.8 cm)  Weight: 85.7 kg (189 lb)  Body mass index is 27.12 kg/m².    Physical Exam:    General:  Pleasant. Well-nourished. Alert.    Head:  Normocephalic, without obvious abnormality, atraumatic    Lungs:   Breathing is quiet, non-lablored    Neurological:    Oriented to Person, Place, Time   Speech:  normal  Memory, cognition, and affect are appropriate.  Motor Strength: Moves all extremities spontaneously with good tone.  No abnormal movements seen.      Cervical (anterior) incision:  C/D/I  Wound edges well-approximated  healing well    ROM:   Mildly limited left rotation, right rotation is full    Gait:  normal    Most recent xrays show stable alignment with satisfactory position of the hardware.        ASSESSMENT/PLAN:       1. Cervical spondylosis with radiculopathy  - X-Ray " Cervical Spine 5 View W Flex Extxt; Future  - Follow up 3 months po w/ x-rays (sx 10/26/23)      I, Dr. Jeremy Silva, personally performed the services described in this documentation. All medical record entries made by the scribe, Yuridia Vasques RN, were at my direction and in my presence.  I have reviewed the chart and agree that the record reflects my personal performance and is accurate and complete. Jeremy Silva MD.  8:39 AM 11/08/2023         Jeremy Silva MD FACS [facility]

## 2023-12-01 ENCOUNTER — PATIENT MESSAGE (OUTPATIENT)
Dept: NEUROSURGERY | Facility: CLINIC | Age: 53
End: 2023-12-01
Payer: COMMERCIAL

## 2024-01-16 NOTE — PROGRESS NOTES
[facility]  History & Physical  Neurosurgery      Allen Esparza   [id number]   1970     SUBJECTIVE:     CHIEF COMPLAINT:  Neck tightness    HPI:  Allen Esparza is a 53 y.o. male who presents for a 3 month post-operative follow-up.  He is s/p  C5-6 TDR by Dr. Silva on 10/26/23.  He was last seen by Dr. Silva in clinic on 11/8/2023 describing some tightness into the right interscapular and trapezius region.  He describes intermittent numbness into the right arm in various locations.  He felt his symptoms were mostly positional and much less severe than prior to surgical intervention.  He felt his range of motion had improved and felt smoother when turning his head.  He continued at that time with limited rotation to the left secondary to tightness into the neck and interscapular region.  Overall he felt much improved compared to preoperatively.  He rated his pain a 3/10 at that time and felt his symptoms continued to gradually improve at that time.    The patient returns today reporting the symptoms that are better since surgery.  The patient is complaining of weakness in the right neck and right superior trapezius region.  He feels this pain is associated to the way that he sleeps.  He continues to have some limitation in rotation of the cervical spine to the right although he feels this is improving.  He continues on with home exercises and strengthening on a regular basis.  He is denying any numbness weakness or radicular upper extremity symptoms today.  He has been struggling with a tremor in the right-greater-than-left hand.  He states he had experienced this in the past and does have a family history of similar issues although feels following surgical intervention the right-sided tremor has progressed.  He also describes jerking of his arms such as punching in his sleep.  The patient denies disturbances in bowel or bladder function.  There is no difficulty with balance.     Past Medical  History:   Diagnosis Date    ADHD     Arthritis     Burning sensation     right arm    Cervical radiculitis     Cervical spondylosis with radiculopathy     Disc displacement, lumbar     Exhaustion     Fatigue     PONV (postoperative nausea and vomiting)     Right arm numbness     Right carpal tunnel syndrome        Past Surgical History:   Procedure Laterality Date    COLONOSCOPY      ELBOW SURGERY Left     EPIDURAL STEROID INJECTION INTO CERVICAL SPINE      EPIDURAL STEROID INJECTION INTO CERVICAL SPINE N/A 02/23/2023    Procedure: INJECTION, STEROID, SPINE, CERVICAL, EPIDURAL C7-T1;  Surgeon: Lamar Villanueva MD;  Location: [facility] OR;  Service: Pain Management;  Laterality: N/A;    EPIDURAL STEROID INJECTION INTO CERVICAL SPINE N/A 04/13/2023    Procedure: INJECTION, STEROID, SPINE, CERVICAL, EPIDURAL C7 T1;  Surgeon: Lamar Villanueva MD;  Location: [facility] OR;  Service: Pain Management;  Laterality: N/A;  C7 T1    EPIDURAL STEROID INJECTION INTO LUMBAR SPINE      LUMBAR FUSION      MICRODISCECTOMY OF SPINE      REPAIR OF SUPERIOR LABRAL ANTERIOR-TO-POSTERIOR (SLAP) TEAR OF SHOULDER Right 2021    ROTATOR CUFF REPAIR Right 2021    SURGICAL REMOVAL OF BONE SPUR      x2 right shoulder    TOTAL REPLACEMENT OF CERVICAL INTERVERTEBRAL DISC N/A 10/26/2023    Procedure: REPLACEMENT, INTERVERTEBRAL DISC, CERVICAL, TOTAL;  Surgeon: Jeremy Silva MD;  Location: [facility];  Service: Neurosurgery;  Laterality: N/A;  C5-6 TDR, ACDF if necessary  NTI    VASECTOMY         Family History   Problem Relation Age of Onset    Diabetes Mother     Hypertension Mother     Stroke Mother     Diabetes Father     Hyperlipidemia Father     Hypertension Father        Social History     Socioeconomic History    Marital status:    Tobacco Use    Smoking status: Never    Smokeless tobacco: Never   Substance and Sexual Activity    Alcohol use: Not Currently     Alcohol/week: 3.0 standard drinks of alcohol     Types: 1 Glasses of wine, 1 Cans of  beer, 1 Shots of liquor per week     Comment: occasionally    Drug use: Never    Sexual activity: Yes       Review of patient's allergies indicates:   Allergen Reactions    Wheat containing prod Other (See Comments)     Rhinitis        Current Outpatient Medications   Medication Instructions    anastrozole (ARIMIDEX) 1 mg, Oral, Daily    clomiPHENE (CLOMID) 50 mg, Oral, Daily    diclofenac (VOLTAREN) 75 mg, As needed (PRN)    fexofenadine-pseudoephedrine (ALLEGRA-D 24) 180-240 mg per 24 hr tablet 1 tablet, Oral, Daily    HYDROcodone-acetaminophen (NORCO) 7.5-325 mg per tablet 1 tablet, Oral, Every 4 hours PRN    levocetirizine (XYZAL) 5 mg, Oral, Nightly    mometasone (NASONEX) 50 mcg/actuation nasal spray 2 sprays, Nasal, Daily PRN    multivitamin capsule 1 capsule, Oral, Daily    pregabalin (LYRICA) 75 mg, Oral, 2 times daily    rOPINIRole (REQUIP) 0.25 mg, Oral, Daily    tadalafiL (CIALIS) 5 mg, Oral, As needed (PRN)          Review of Systems   Constitutional:  Negative for chills, fever and weight loss.   HENT:  Negative for congestion, hearing loss, nosebleeds and tinnitus.    Eyes:  Negative for blurred vision, double vision and photophobia.   Respiratory:  Negative for cough, shortness of breath and wheezing.    Cardiovascular:  Negative for chest pain, palpitations and leg swelling.   Gastrointestinal:  Negative for constipation, diarrhea, nausea and vomiting.   Genitourinary:  Negative for dysuria, frequency and urgency.   Musculoskeletal:  Positive for neck pain (Tightness pulling in the right neck and superior trapezius). Negative for back pain and falls.   Skin:  Negative for itching and rash.   Neurological:  Positive for tremors (bilateral upper extremities). Negative for dizziness, tingling, sensory change, speech change, seizures, loss of consciousness and headaches.   Psychiatric/Behavioral:  Negative for depression, hallucinations and memory loss. The patient is not nervous/anxious.   "        OBJECTIVE:     Physical Exam    Visit Vitals  /83 (BP Location: Left arm, Patient Position: Sitting)   Pulse 69   Ht 5' 10" (1.778 m)   Wt 88.5 kg (195 lb)   BMI 27.98 kg/m²        General:  Pleasant, Well-nourished, Well-groomed.    Cardiovascular:  Heart has regular rate and rhythm.    Lungs:  Breathing is quiet, non-lablored    Musculoskeletal:  Incision: no significant drainage, no dehiscence, no significant erythema, well healed.  ROM is Full  Slightly limited cervical rotation to the right secondary to tightness    Neurological:  Muscle strength against resistance:   Right Left   Deltoid (C5) 5/5 5/5   Biceps (C5/6) 5/5 5/5   Brachioradialis (C5/6) 5/5 5/5   Triceps (C7) 5/5 5/5   Wrist extension (C7) 5/5 5/5   Finger abduction (C8) 5/5 5/5    5/5 5/5   Sensation is intact to primary modalities in bilateral upper and lower extremities.    Reflexes:  Negative Babinski, Clonus, Lorenzo, Tinel's, and Phalen's bilaterally.  Gait is normal  Coordination is normal.  Tremor noted to the bilateral hands, right-greater-than-left    Imaging:  All pertinent neuroimaging independently reviewed. Discussed these findings in detail with the patient.    Updated x-rays of the cervical spine dated 1/29/2024 reveal stable postoperative changes with disc replacement at C5-6.  Normal alignment noted with no abnormal motion on extension or flexion views.  Slight straightening of normal cervical lordosis to the upper cervical region from C2-C5.    ASSESSMENT:       ICD-10-CM ICD-9-CM   1. Cervical spondylosis with radiculopathy  M47.22 721.0   2. Tremor of both hands  R25.1 781.0       PLAN:     1. Cervical spondylosis with radiculopathy  - X-Ray Cervical Spine 5 View W Flex Extxt; Future    2. Tremor of both hands  - Ambulatory referral/consult to Neurology; Future    Allen Esparza returns today reporting continued improvement of symptoms following surgical intervention.  He does continue to struggle with some " tightness into the right neck and right superior trapezius region.  I did take the time to review his recent x-rays as well as previous x-rays in clinic today.  We did discuss dry needling, physical therapy as well as massage therapy today.  The patient states he would like to attempt some massage therapy to hopefully alleviate his continued symptoms.  I recommend the patient attend massage therapy once monthly for the next month then once every other week for a month and as needed going forward from that time.  We did discuss trigger point injections as well should his symptoms persist despite massage therapy.  He was encouraged to continue on with home strengthening exercises.  We will plan to see the patient back for a 1 year postoperative visit with updated x-rays at that time.  He was advised to notify us should his symptoms persist.    Follow up in about 9 months (around 10/26/2024) for With Imaging Prior to Appt.    E/M Level Based On Time:   15 minutes spent on reviewing chart, which includes interpreting lab results and diagnostic tests.   20 minutes spent in the room with the patient performing a history and physical exam, counseling or educating the patient/caregiver, prescribing medications, ordering labwork/diagnostic tests, or placing referrals.   0 minutes spent collaborating plan of care with physician.   5 minutes spent documenting all relevant clinical informationin the electronic health record.     Total Time Spent: 40 minutes         MANGO Rodríguez    Disclaimer:  This note is prepared using voice recognition software and as such is likely to have errors despite attempts at proofreading. Please contact me for questions.

## 2024-01-29 ENCOUNTER — HOSPITAL ENCOUNTER (OUTPATIENT)
Dept: RADIOLOGY | Facility: HOSPITAL | Age: 54
Discharge: HOME | End: 2024-01-29
Attending: NEUROLOGICAL SURGERY
Payer: COMMERCIAL

## 2024-01-29 ENCOUNTER — OFFICE VISIT (OUTPATIENT)
Dept: NEUROSURGERY | Facility: CLINIC | Age: 54
End: 2024-01-29
Payer: COMMERCIAL

## 2024-01-29 VITALS
WEIGHT: 195 LBS | HEIGHT: 70 IN | HEART RATE: 69 BPM | BODY MASS INDEX: 27.92 KG/M2 | SYSTOLIC BLOOD PRESSURE: 126 MMHG | DIASTOLIC BLOOD PRESSURE: 83 MMHG

## 2024-01-29 DIAGNOSIS — R25.1 TREMOR OF BOTH HANDS: ICD-10-CM

## 2024-01-29 DIAGNOSIS — M47.22 CERVICAL SPONDYLOSIS WITH RADICULOPATHY: Primary | ICD-10-CM

## 2024-01-29 DIAGNOSIS — M47.22 CERVICAL SPONDYLOSIS WITH RADICULOPATHY: ICD-10-CM

## 2024-01-29 PROCEDURE — 1159F MED LIST DOCD IN RCRD: CPT | Mod: CPTII,,, | Performed by: NURSE PRACTITIONER

## 2024-01-29 PROCEDURE — 72052 X-RAY EXAM NECK SPINE 6/>VWS: CPT | Mod: TC

## 2024-01-29 PROCEDURE — 1160F RVW MEDS BY RX/DR IN RCRD: CPT | Mod: CPTII,,, | Performed by: NURSE PRACTITIONER

## 2024-01-29 PROCEDURE — 3008F BODY MASS INDEX DOCD: CPT | Mod: CPTII,,, | Performed by: NURSE PRACTITIONER

## 2024-01-29 PROCEDURE — 3079F DIAST BP 80-89 MM HG: CPT | Mod: CPTII,,, | Performed by: NURSE PRACTITIONER

## 2024-01-29 PROCEDURE — 3074F SYST BP LT 130 MM HG: CPT | Mod: CPTII,,, | Performed by: NURSE PRACTITIONER

## 2024-01-29 PROCEDURE — 99215 OFFICE O/P EST HI 40 MIN: CPT | Mod: ,,, | Performed by: NURSE PRACTITIONER

## 2024-01-29 RX ORDER — ANASTROZOLE 1 MG/1
1 TABLET ORAL DAILY
COMMUNITY
Start: 2024-01-22 | End: 2024-04-01

## 2024-01-29 RX ORDER — ROPINIROLE 0.5 MG/1
0.25 TABLET, FILM COATED ORAL DAILY
COMMUNITY
Start: 2024-01-23 | End: 2024-02-22 | Stop reason: SDUPTHER

## 2024-02-22 ENCOUNTER — OFFICE VISIT (OUTPATIENT)
Dept: NEUROLOGY | Facility: CLINIC | Age: 54
End: 2024-02-22
Payer: COMMERCIAL

## 2024-02-22 VITALS
BODY MASS INDEX: 26.77 KG/M2 | WEIGHT: 187 LBS | DIASTOLIC BLOOD PRESSURE: 82 MMHG | HEIGHT: 70 IN | SYSTOLIC BLOOD PRESSURE: 118 MMHG

## 2024-02-22 DIAGNOSIS — G25.3 MYOCLONUS: ICD-10-CM

## 2024-02-22 DIAGNOSIS — E61.1 HYPOFERREMIA: ICD-10-CM

## 2024-02-22 DIAGNOSIS — G25.81 RLS (RESTLESS LEGS SYNDROME): ICD-10-CM

## 2024-02-22 DIAGNOSIS — G25.0 ESSENTIAL TREMOR: Primary | ICD-10-CM

## 2024-02-22 PROCEDURE — 3074F SYST BP LT 130 MM HG: CPT | Mod: CPTII,S$GLB,, | Performed by: PSYCHIATRY & NEUROLOGY

## 2024-02-22 PROCEDURE — 99999 PR PBB SHADOW E&M-EST. PATIENT-LVL III: CPT | Mod: PBBFAC,,, | Performed by: PSYCHIATRY & NEUROLOGY

## 2024-02-22 PROCEDURE — 1159F MED LIST DOCD IN RCRD: CPT | Mod: CPTII,S$GLB,, | Performed by: PSYCHIATRY & NEUROLOGY

## 2024-02-22 PROCEDURE — 3079F DIAST BP 80-89 MM HG: CPT | Mod: CPTII,S$GLB,, | Performed by: PSYCHIATRY & NEUROLOGY

## 2024-02-22 PROCEDURE — 99205 OFFICE O/P NEW HI 60 MIN: CPT | Mod: S$GLB,,, | Performed by: PSYCHIATRY & NEUROLOGY

## 2024-02-22 PROCEDURE — 3008F BODY MASS INDEX DOCD: CPT | Mod: CPTII,S$GLB,, | Performed by: PSYCHIATRY & NEUROLOGY

## 2024-02-22 RX ORDER — PROPRANOLOL HYDROCHLORIDE 10 MG/1
10-20 TABLET ORAL 2 TIMES DAILY PRN
COMMUNITY
Start: 2024-01-30 | End: 2024-03-20

## 2024-02-22 RX ORDER — PROPRANOLOL HYDROCHLORIDE 60 MG/1
60 CAPSULE, EXTENDED RELEASE ORAL DAILY
Qty: 30 CAPSULE | Refills: 11 | Status: SHIPPED | OUTPATIENT
Start: 2024-02-22 | End: 2024-06-10 | Stop reason: SDUPTHER

## 2024-02-22 RX ORDER — ROPINIROLE 1 MG/1
1 TABLET, FILM COATED ORAL NIGHTLY
Qty: 30 TABLET | Refills: 12 | Status: SHIPPED | OUTPATIENT
Start: 2024-02-22 | End: 2024-04-01

## 2024-02-22 RX ORDER — GABAPENTIN 300 MG/1
300 CAPSULE ORAL NIGHTLY
Qty: 30 CAPSULE | Refills: 11 | Status: SHIPPED | OUTPATIENT
Start: 2024-02-22 | End: 2024-04-01
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
## 2024-02-29 ENCOUNTER — PATIENT MESSAGE (OUTPATIENT)
Dept: NEUROLOGY | Facility: CLINIC | Age: 54
End: 2024-02-29
Payer: COMMERCIAL

## 2024-02-29 ENCOUNTER — LAB VISIT (OUTPATIENT)
Dept: LAB | Facility: HOSPITAL | Age: 54
End: 2024-02-29
Attending: PSYCHIATRY & NEUROLOGY
Payer: COMMERCIAL

## 2024-02-29 DIAGNOSIS — G25.0 ESSENTIAL TREMOR: ICD-10-CM

## 2024-02-29 DIAGNOSIS — G25.81 RLS (RESTLESS LEGS SYNDROME): ICD-10-CM

## 2024-02-29 DIAGNOSIS — E61.1 HYPOFERREMIA: ICD-10-CM

## 2024-02-29 LAB
FERRITIN SERPL-MCNC: 174.16 NG/ML (ref 21.81–274.66)
IRON SATN MFR SERPL: 31 % (ref 20–50)
IRON SERPL-MCNC: 85 UG/DL (ref 65–175)
TIBC SERPL-MCNC: 190 UG/DL (ref 69–240)
TIBC SERPL-MCNC: 275 UG/DL (ref 250–450)
TRANSFERRIN SERPL-MCNC: 255 MG/DL (ref 174–364)

## 2024-02-29 PROCEDURE — 83540 ASSAY OF IRON: CPT

## 2024-02-29 PROCEDURE — 82728 ASSAY OF FERRITIN: CPT

## 2024-02-29 PROCEDURE — 36415 COLL VENOUS BLD VENIPUNCTURE: CPT

## 2024-03-20 PROBLEM — G25.81 RLS (RESTLESS LEGS SYNDROME): Status: ACTIVE | Noted: 2024-03-20

## 2024-03-20 PROBLEM — G25.0 ESSENTIAL TREMOR: Status: ACTIVE | Noted: 2024-03-20

## 2024-03-20 PROBLEM — G25.3 MYOCLONUS: Status: ACTIVE | Noted: 2024-03-20
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
## 2024-04-01 ENCOUNTER — OFFICE VISIT (OUTPATIENT)
Dept: NEUROLOGY | Facility: CLINIC | Age: 54
End: 2024-04-01
Payer: COMMERCIAL

## 2024-04-01 VITALS
SYSTOLIC BLOOD PRESSURE: 120 MMHG | DIASTOLIC BLOOD PRESSURE: 80 MMHG | HEIGHT: 70 IN | WEIGHT: 190 LBS | BODY MASS INDEX: 27.2 KG/M2

## 2024-04-01 DIAGNOSIS — G25.81 RLS (RESTLESS LEGS SYNDROME): ICD-10-CM

## 2024-04-01 DIAGNOSIS — G25.0 ESSENTIAL TREMOR: Primary | ICD-10-CM

## 2024-04-01 PROCEDURE — 99999 PR PBB SHADOW E&M-EST. PATIENT-LVL III: CPT | Mod: PBBFAC,,, | Performed by: NURSE PRACTITIONER

## 2024-04-01 PROCEDURE — 3079F DIAST BP 80-89 MM HG: CPT | Mod: CPTII,S$GLB,, | Performed by: NURSE PRACTITIONER

## 2024-04-01 PROCEDURE — 3074F SYST BP LT 130 MM HG: CPT | Mod: CPTII,S$GLB,, | Performed by: NURSE PRACTITIONER

## 2024-04-01 PROCEDURE — 3008F BODY MASS INDEX DOCD: CPT | Mod: CPTII,S$GLB,, | Performed by: NURSE PRACTITIONER

## 2024-04-01 PROCEDURE — 99214 OFFICE O/P EST MOD 30 MIN: CPT | Mod: S$GLB,,, | Performed by: NURSE PRACTITIONER

## 2024-04-01 PROCEDURE — 1159F MED LIST DOCD IN RCRD: CPT | Mod: CPTII,S$GLB,, | Performed by: NURSE PRACTITIONER

## 2024-04-01 PROCEDURE — 1160F RVW MEDS BY RX/DR IN RCRD: CPT | Mod: CPTII,S$GLB,, | Performed by: NURSE PRACTITIONER

## 2024-04-01 RX ORDER — GABAPENTIN ENACARBIL 300 MG/1
300 TABLET, EXTENDED RELEASE ORAL NIGHTLY
Qty: 30 TABLET | Refills: 12 | Status: SHIPPED | OUTPATIENT
Start: 2024-04-01 | End: 2024-06-10

## 2024-04-01 RX ORDER — GABAPENTIN ENACARBIL 300 MG/1
300 TABLET, EXTENDED RELEASE ORAL NIGHTLY
Qty: 30 TABLET | Refills: 12 | Status: SHIPPED | OUTPATIENT
Start: 2024-04-01 | End: 2024-04-01
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
## 2024-06-10 ENCOUNTER — OFFICE VISIT (OUTPATIENT)
Dept: NEUROLOGY | Facility: CLINIC | Age: 54
End: 2024-06-10
Payer: COMMERCIAL

## 2024-06-10 VITALS
SYSTOLIC BLOOD PRESSURE: 120 MMHG | DIASTOLIC BLOOD PRESSURE: 80 MMHG | BODY MASS INDEX: 26.2 KG/M2 | WEIGHT: 183 LBS | HEIGHT: 70 IN

## 2024-06-10 DIAGNOSIS — G25.81 RLS (RESTLESS LEGS SYNDROME): Primary | ICD-10-CM

## 2024-06-10 DIAGNOSIS — G25.3 MYOCLONUS: ICD-10-CM

## 2024-06-10 DIAGNOSIS — M62.838 MUSCLE SPASM: ICD-10-CM

## 2024-06-10 DIAGNOSIS — G25.0 ESSENTIAL TREMOR: ICD-10-CM

## 2024-06-10 PROCEDURE — 99214 OFFICE O/P EST MOD 30 MIN: CPT | Mod: S$GLB,,, | Performed by: NURSE PRACTITIONER

## 2024-06-10 PROCEDURE — 1160F RVW MEDS BY RX/DR IN RCRD: CPT | Mod: CPTII,S$GLB,, | Performed by: NURSE PRACTITIONER

## 2024-06-10 PROCEDURE — 3079F DIAST BP 80-89 MM HG: CPT | Mod: CPTII,S$GLB,, | Performed by: NURSE PRACTITIONER

## 2024-06-10 PROCEDURE — 1159F MED LIST DOCD IN RCRD: CPT | Mod: CPTII,S$GLB,, | Performed by: NURSE PRACTITIONER

## 2024-06-10 PROCEDURE — 99999 PR PBB SHADOW E&M-EST. PATIENT-LVL III: CPT | Mod: PBBFAC,,, | Performed by: NURSE PRACTITIONER

## 2024-06-10 PROCEDURE — 3008F BODY MASS INDEX DOCD: CPT | Mod: CPTII,S$GLB,, | Performed by: NURSE PRACTITIONER

## 2024-06-10 PROCEDURE — 3074F SYST BP LT 130 MM HG: CPT | Mod: CPTII,S$GLB,, | Performed by: NURSE PRACTITIONER

## 2024-06-10 RX ORDER — PROPRANOLOL HYDROCHLORIDE 60 MG/1
60 CAPSULE, EXTENDED RELEASE ORAL DAILY
Qty: 90 CAPSULE | Refills: 4 | Status: SHIPPED | OUTPATIENT
Start: 2024-06-10 | End: 2025-09-03

## 2024-06-10 RX ORDER — TIZANIDINE 2 MG/1
2 TABLET ORAL EVERY 8 HOURS PRN
Qty: 30 TABLET | Refills: 5 | Status: SHIPPED | OUTPATIENT
Start: 2024-06-10

## 2024-06-10 NOTE — PATIENT INSTRUCTIONS
"INTRODUCTION   Tremor is defined as an involuntary, rhythmic, and oscillatory movement of a body part [1]. It is caused by either alternating or synchronous contractions of antagonistic muscles. Tremor is the most common of all movement disorders, occurring from time to time in many normal individuals in the form of exaggerated physiologic tremor [2].  This topic will provide an overview of the classification, clinical features, diagnostic evaluation, and treatment of tremor, including essential tremor (ET), which also is discussed in greater detail separately. (See "Essential tremor: Clinical features and diagnosis" and "Essential tremor: Treatment and prognosis" and "Surgical treatment of essential tremor".)    CLASSIFICATION -- Tremors have been variably defined and categorized over time, and classification is made difficult by overlapping clinical characteristics and etiologies. The most common distinction is based on the activating conditions (ie, at rest versus action), but other clinical characteristics, such as frequency and body distribution, also guide the evaluation and etiologic diagnosis.  A clinical and etiologic classification scheme has been proposed by the International Parkinson and Movement Disorder Society [1]. It provides a framework upon which currently recognized and new tremor syndromes can be defined.  Clinical characteristics -- Clinical classification of tremor is based on history, tremor characteristics, associated neurologic and systemic signs, and, in some cases, additional testing.  Rest versus action -- The activating conditions that give rise to a tremor are a key distinguishing feature (table 1).  Rest tremor occurs in a body part that is fully supported, relaxed, and not voluntarily activated.  Action tremor occurs with voluntary muscle contraction and is further subdivided into three types:  ?Kinetic tremor occurs during any voluntary movement. Kinetic tremors include:  Simple " kinetic tremor, in which tremor is roughly the same throughout the course of a voluntary movement  Intention tremor, characterized by a crescendo increase in tremor as the affected body part approaches its target  Task-specific kinetic tremor, which occurs during a specific task such as writing  ?Postural tremor occurs when a specific posture or position, such as holding the arms outstretched or while standing, is voluntarily maintained.  ?Isometric tremor occurs during muscle contraction against a stationary object, such as making a fist or squeezing an object.  Tremor frequency and body distribution -- Tremor frequency (oscillations per second) is used to describe tremor, although it is not particularly helpful in diagnosis as most pathologic tremors have a frequency of 4 to 8 Hertz (Hz) [1]. The most common subdivisions used for descriptive purposes are <4, 4 to 8, 8 to 12, and >12 Hz. Tremor frequency is typically estimated clinically but can be accurately measured using a motion transducer or electromyography (EMG).  The anatomic distribution of tremor can be classified as:  ?Focal (only one body region is affected, such as voice, head, jaw, or one limb)  ?Segmental (two or more contiguous body parts in the upper or lower body are affected, such as head and arm)  ?Hemitremor (one side of the body is affected)  ?Generalized (upper and lower body are affected)  Isolated versus combined -- Tremor syndromes are specific combinations of clinical signs and symptoms that commonly coexist. Syndromes can help narrow the search for an etiologic diagnosis. Syndromes may be isolated, when tremor is the only neurologic manifestation, or combined, when other systemic or neurologic signs coexist with the tremor (table 2) [1]. Importantly, syndromes may have multiple etiologies, and a particular etiology may produce multiple clinical syndromes.  Etiology -- A vast number of diseases, disorders, medications, toxins, and  "substances cause tremor. Etiologies can be broadly classified as genetic, acquired, and idiopathic.  Some of the more common etiologies of resting and action tremors are listed in the table (table 3). In addition to these, there are many uncommon or rare conditions that may be associated with tremor; most are action tremors [3].    REST TREMORS -- Parkinson disease (PD) and other parkinsonian syndromes are the most common causes of rest tremor.  General features -- Rest tremor is evident with the affected body part fully supported against gravity and completely at rest. It is temporarily dampened or abolished during voluntary activity. Rest tremors usually fluctuate in amplitude and may appear and disappear depending upon the degree of patient repose, whether the patient feels that they are under observation, and other factors, including stress, excitement, or recent exercise.  Rest tremor is typically less disabling than postural or kinetic tremors because of its absence during voluntary activity. However, a rest tremor may quickly reappear as soon as the body part assumes a new resting or antigravity posture (a phenomenon referred to as "re-emergent tremor"), and in this case will interfere with the use of eating utensils, handwriting, typing, and other purposeful postures or movements. Rest tremor with these postural features is more of a handicap or disability than pure rest tremor, which is of mainly cosmetic concern.  Dystonic tremor is usually postural or task specific but it can occur at rest, in which state it tends to be jerky and irregular, unlike the smoother, more rhythmic pattern of the tremor of parkinsonism. (See 'Dystonic tremor' below.)  On examination, when rest tremor is absent or minimally apparent, it usually can be activated or enhanced by having the patient walk, perform repetitive movements of the opposite limb, or perform a cognitive task, such as naming the months of the year " "backwards.  Idiopathic Parkinson disease -- The rest tremor in PD typically appears first in one hand or less commonly in one leg. As the disease progresses, tremor may spread from one hand to involve the ipsilateral leg and/or the contralateral arm. Tremor of the leg or foot is more commonly due to PD than to essential tremor (ET). The face, lips, and jaw may be involved but, unlike ET or cerebellar disease, PD only occasionally produces tremor of the entire head. (See "Clinical manifestations of Parkinson disease", section on 'Tremor'.)  When the tremor in PD is limited to distal muscles of the hand, it may produce a characteristic "pill-rolling" pattern, with a frequency of 4 to 6 Hertz (Hz). With increasing severity, the tremor may become more continuous, larger in amplitude, and more proximal in distribution, but the frequency remains constant.  Postural tremor may coexist with rest tremor or may be present by itself in some patients with PD, sometimes leading to an incorrect diagnosis of ET. (See 'Essential tremor' below.)  Tremor-dominant Parkinson disease -- A low-amplitude rest tremor of the hand or jaw, unaccompanied by other manifestations of parkinsonism, sometimes occurs as an isolated finding and may not progress to more generalized PD. However, tremor is such a common first sign of PD that it most often is followed by progressive and more disabling symptoms such as generalized bradykinesia, gait disturbance, and postural instability.  Patients can go many years into their disease with tremor as the only or the most prominent sign of their disorder. When this happens, this is referred to as "tremor-dominant PD," although this designation is most reliably applied in retrospect, when the relatively benign course has already been charted.  Isolated rest tremor (SWEDD) -- The radiographic term "SWEDD" (Scans Without Evidence of Dopaminergic Deficit) has been used to designate patients with relatively " "isolated rest and action tremors of the arms, resembling those of early PD, that have failed to evolve over time into more generalized PD [4]. Unlike patients with PD, these individuals lack evidence for nigrostriatal dopamine deficiency with dopamine transporter imaging (DaTscan). Patients with SWEDD sometimes exhibit reduced arm swing and mild focal dystonia on the affected side, and may have jaw or head tremor or facial hypomimia, but no signs of parkinsonian bradykinesia [4]. They are therefore to be distinguished from individuals with tremor-dominant PD. (See "Diagnosis and differential diagnosis of Parkinson disease", section on 'Scans without evidence of dopaminergic deficit (SWEDD)'.)    Others -- Other disorders associated with rest tremor include drug-induced parkinsonism secondary to dopamine receptor blocking agents; the atypical parkinsonian syndromes such as dementia with Lewy bodies, multiple system atrophy, and progressive supranuclear palsy; Luis Alfredo disease; non-Wilsonian hepatocerebral degeneration; and thalamic or midbrain injury due to stroke, trauma, or demyelinating disease. Rest tremor may also occur as a "spillover" phenomenon in a variety of disorders in which very severe action tremors predominate, such as Luis Alfredo disease, multiple sclerosis, severe forms of ET [5], and other cerebellar or extrapyramidal syndromes.  Rubral tremor and dystonic tremor are combined tremor syndromes in which the tremor can have a rest component but is often mixed with postural and kinetic features as well. (See 'Rubral tremor' below and 'Dystonic tremor' below.)    ACTION TREMORS -- Action tremors are the largest group of tremors. They include postural tremors, elicited during examination with the arms suspended against gravity in a fixed posture, and kinetic tremors, which emerge during the course of goal-directed activity (eg, drinking from a glass of water, pouring water from one cup into another, writing, and " "drawing of a spiral).  Physiologic tremor -- Normal individuals have a very low-amplitude, high-frequency physiologic tremor of approximately 10 to 12 Hertz (Hz). Physiologic tremor is usually not visible under ordinary circumstances, although some people have a natural proclivity to demonstrate mild, nondisabling physiologic tremulousness. Many factors can enhance the tremor to the point of detection, most often by increasing sympathetic activity [2].  ?Medications - Common drugs that increase adrenergic activity include beta-adrenergic agonists such as terbutaline, albuterol, isoproterenol, epinephrine, amphetamines, selective serotonin reuptake inhibitors (SSRIs) and tricyclic antidepressants, levodopa, nicotine, and xanthines such as theophylline and caffeine (table 4).  ?High adrenergic state - Anxiety, excitement, fright, muscle fatigue, hypoglycemia, alcohol and opioid withdrawal, thyrotoxicosis, fever, and pheochromocytoma also enhance adrenergic activity. (See "Neurologic manifestations of hyperthyroidism and Graves' disease", section on 'Tremor'.)  Enhancement of physiologic tremor is the most common cause of action tremor. Thus, a medical rather than primary neurologic cause for action tremor should be considered first in most cases.  Drug-induced tremor -- Drugs and toxins that commonly increase physiologic tremor or induce tremor by other means include lithium, antidepressants, corticosteroids, sodium valproate, tacrolimus, bromides, mercury, lead, and arsenic (table 4).  Essential tremor -- Essential tremor (ET) is reviewed here briefly and is discussed in detail separately. (See "Essential tremor: Clinical features and diagnosis" and "Essential tremor: Treatment and prognosis" and "Surgical treatment of essential tremor".)  ET is the most common neurologic disorder that causes action tremor, with an estimated prevalence worldwide of up to 5 percent of the population. The incidence of ET increases with " "age, although it often affects middle-aged individuals, especially when it is familial. A family history is present in 30 to 70 percent of cases, and evidence suggests an autosomal dominant pattern of inheritance with reduced penetrance. (See "Essential tremor: Clinical features and diagnosis", section on 'Pathogenesis and genetics'.)  ET varies from a low-amplitude, high-frequency postural tremor of the hands to a much larger-amplitude, and at times lower-frequency, postural and action tremor that is activated by particular postures and actions. ET most often affects the hands and arms and can be asymmetric. Less often, it may involve the head, voice, trunk, and legs. ET becomes immediately apparent in the arms when they are held outstretched; it typically increases at the very end of goal-directed movements such as drinking from a glass or finger-to-nose testing. Tremor in the legs is unusual in ET. Tremor of the head may be vertical ("yes-yes") or horizontal ("no-no") and is usually associated with tremor of the hand or voice. ET is typically relieved by small amounts of alcohol. Caffeine can worsen the tremor, although this effect is much more commonly seen with physiologic tremor.  The diagnosis of ET is based upon clinical features (table 5). (See "Essential tremor: Clinical features and diagnosis", section on 'Evaluation'.)  The main considerations in the differential diagnosis of ET are enhanced physiologic tremor, drug-induced tremor, parkinsonian tremor, tremor caused by cerebellar disorders, and dystonic tremor.  A frequent entity on the differential diagnosis of ET is parkinsonian tremor (table 6 and table 7 and table 8). Differentiation from classic resting tremor should be straightforward; however, some patients with Parkinson disease (PD) also have an action tremor indistinguishable from ET. In fact, it is not unusual for patients with PD to present with a postural tremor before they display other " "signs of PD. Likewise, patients with severe ET may have a rest component to their tremor, and some patients with ET exhibit mild nonprogressive parkinsonism.    Primary writing tremor -- Many action tremors are particularly severe during the act of writing. Tremor that occurs exclusively while writing, and not during other voluntary motor activities, is referred to as "primary writing tremor" [6]. This tremor is limited to the hand and causes relatively large-amplitude supination-pronation movements at a frequency of 5 to 6 Hz. The low frequency and large amplitude of the tremor, its frequent occurrence in writer's "cramp" or writer's dystonia, its relative resistance to propranolol, and its occasional response to anticholinergic drugs suggest a closer relationship to dystonia than to ET. (See "Etiology, clinical features, and diagnostic evaluation of dystonia", section on 'Task-specific dystonia'.)    Orthostatic tremor -- Orthostatic tremor is a postural tremor limited to the legs and trunk that occurs exclusively while standing [7-10]. In two reports of 184 and 68 patients, there was a female predominance (64 and 77 percent, respectively) [11,12]. The mean age of onset was between 54 and 59 years (range 13 to 85 years). Both high- and low-frequency orthostatic tremors have been described; their relationship to ET is uncertain. In cases of high-frequency tremor, movements of the legs may be so low in amplitude that they initially escape clinical detection.    Cerebellar tremor -- "Cerebellar tremor" is the term used to describe a tremor that is caused by known cerebellar pathology. It can be postural, simple kinetic, intention, or any combination of these features. In severe cases, cerebellar tremor, like ET, can spill over and also occur at rest. Tremor frequency is typically low (3 to 4 Hz) and can be associated with ataxia and dysmetria.  Cerebellar tremor is due to disturbances anywhere along the path of the " "cerebellar outflow projection system from the dentate nucleus of the cerebellum to its termination in the ventral lateral nucleus of the thalamus. The most common causes are multiple sclerosis, midbrain trauma, and stroke (table 3). Degenerative and inflammatory diseases of the cerebellum, severe forms of ET, Luis Alfredo disease, hepatocerebral degeneration, and mercury poisoning may also produce this tremor.  Cerebellar tremor typically has a component of intention tremor in that the tremor increases in severity, either gradually or abruptly, as the hand moves closer to its target. Intention tremors are usually very large in amplitude due to involvement of proximal muscles and are sometimes difficult to distinguish from severe cerebellar ataxia. The frequent association with ataxia, dysmetria, titubation, and other cerebellar signs serves to identify the cerebellar origin of intention tremor.  Titubation of the head and neck ("to and fro" movements) may be associated with cerebellar tremor; it is distinguished from essential head tremor by the presence of other cerebellar findings.    Rubral tremor -- Rubral tremor, sometimes called Sanchez tremor after the early 20th century English neurologist who described it, is a subtype of cerebellar tremor with a greater degree of proximal, high-amplitude movement. Rubral tremor is caused by midbrain, thalamic, cerebellar, or pontine injury [13]. In many cases of rubral tremor, there are combined lesions that interrupt the outflow pathway from the cerebellum to the motor thalamus, often involving the superior cerebellar peduncle, substantia nigra, or red nucleus.  Rubral tremor is present at rest but tends to have a slower frequency (3 to 5 Hz) than typical Parkinson rest tremor (5 to 7 Hz). It also persists unchanged or increases with postural changes or goal-directed activity. It may produce a combination of rest, postural, simple kinetic, and intention tremors.  Neuropathic " "tremor -- Postural or kinetic tremors are sometimes associated with large fiber peripheral neuropathy. This association is most commonly observed in hereditary neuropathies, during the recovery phase of some cases of Guillain-Barré syndrome, and in chronic inflammatory demyelinating polyneuropathy (CIDP). Muscle weakness and loss of proprioceptive or muscle spindle inputs may account for the tremor. The frequency and amplitude of neuropathic tremor may vary greatly when associated with a proprioceptive deficit. (See "Overview of polyneuropathy".)    Dystonic tremor -- Dystonic tremor refers to tremor in a body part that is affected by dystonia. Because of the association with dystonia, dystonic tremor is an example of a combined tremor syndrome [1]. Common examples include dystonic head tremor and segmental tremulous dystonia of the head and upper limbs, which occur due to cervical dystonia. (See "Etiology, clinical features, and diagnostic evaluation of dystonia".)  Dystonic tremors are usually postural or task specific but sometimes occur at rest, in which state they tend to be jerky and irregular. The use of an alleviating maneuver (geste antagoniste, sensory trick) can be helpful in distinguishing dystonic tremor from other tremor syndromes, such as ET. In dystonic head tremor, for example, moving an arm to the face or head in a specific plane can alleviate cervical dystonia symptoms, including head tremor. Exacerbation of dystonic tremor by an attempt to maintain certain postures can also be helpful in the examination.    FUNCTIONAL TREMORS -- Functional tremor, previously known as psychogenic tremor, is typically characterized by complex rest, postural, and kinetic tremor with abrupt onset, a static course, changeable features, functional disability out of proportion to tremor magnitude, and resistance to treatment. Any body part may be involved, but, remarkably, the fingers are often spared, with much of the " "tremor of the arm occurring at the wrist.  Other features of functional movement disorders are often present, such as distractibility, variability of symptoms, and clinical features that are incongruous with known tremor syndromes. Examination usually shows variable tremor direction and frequency, distractibility (attenuation or elimination of tremor with distraction maneuvers such as repetitive tapping tasks with an uninvolved opposite hand or foot), and/or tremor entrainment (ie, shift of tremor frequency to the speed of a distraction maneuver). (See "Functional movement disorders", section on 'Clinical features'.)    EVALUATION -- The diagnostic approach to patients with tremor involves the history, physical examination, and selected laboratory studies [14]. Action tremor is most common and, of these, essential tremor (ET) and enhanced physiologic tremor are the most frequent causes [15]. Patients with tremor due to other disorders such as hyperthyroidism, Parkinson disease (PD), dystonia, Luis Alfredo disease, or medications frequently have additional signs or symptoms that help point to the diagnosis, although this is not always the case. The criteria for ET (table 5) and methods for distinguishing it from other tremors are shown in the tables (table 1 and table 3 and table 6 and table 7 and table 8).    History -- The history concerning the age of onset and evolution of tremor is usually straightforward, since it is a highly visible symptom that is readily evident to the patient and family members. Examination of previous handwriting samples may be useful in determining the precise time of onset.  Body parts that are affected and symmetry can be helpful clues to the likely diagnosis. Head tremor is common in ET but unusual in PD; conversely, jaw tremor and resting leg tremor are common in PD and rare in ET. Jaw or chin tremor at rest should be distinguished from lower lip tremor that occurs with activation, as " facial tremor with activation may be seen in ET. Vocal tremor is strongly suggestive of ET. Asymmetry or unilaterality is typically more characteristic of PD tremor than ET. Any involuntary posturing that occurs with tremor should raise the possibility of a dystonic tremor.  Precipitating, aggravating, or relieving factors such as caffeine, alcohol, medications, exercise, fatigue, or stress should be elicited; a complete list of all medications should be reviewed to exclude the possibility of enhanced physiologic tremor and drug-induced tremor (see 'Physiologic tremor' above). Mitigation of tremor by alcohol is classically described in ET but is not highly sensitive or specific.  Patients should also be questioned about any associated neurologic symptoms that would assist in diagnosis, including loss of dexterity, stiffness, stooped posture, voice softness, shuffling gait, acting out of dreams (all suggestive of PD), cognitive changes, and visual hallucinations (suggestive of dementia with Lewy bodies). Lack of any of the above features along with a predominantly action tremor would be suggestive of ET.  Family history in ET reflects an autosomal dominant pattern of inheritance in approximately half of all patients. Parkinsonian tremor is usually sporadic, but a family history of PD is present in approximately 15 percent of cases; the disease affects first-degree relatives (parents, siblings, or offspring) in approximately half of the familial cases. Autosomal PD affecting three or more generations is rare.  Examination -- A detailed neurologic examination is important to identify specific features of the tremor (including its frequency, amplitude, pattern, and body distribution), activating conditions, and other neurologic findings, if present [1,16].  Examination begins with observations of the tremor during the interview. Due to stress, many patients with tremor are more symptomatic during the early part of the  "examination than after they become acclimated to the doctor-patient encounter. Patients should be observed sitting and walking. Horizontal or vertical head tremor is usually associated with tremor elsewhere. Isolated head tremor should raise the possibility of cervical dystonia or midline cerebellar syndromes. Localized jaw tremor is more commonly a manifestation of parkinsonism, while localized face or lip tremor may occur in ET. Essential voice tremor is readily audible and may be further enhanced by having the patient hold a prolonged note.  Tremor in the arm is observed with the affected limb fully supported on the patient's lap or the arm of a chair, with the limb elevated against gravity, and during goal-directed movements. Most resting tremors cease with changes in limb posture but may reappear following repositioning to another fixed posture, especially when held outstretched against gravity, referred to as "re-emergent tremor." Parkinsonian tremor, in contrast with ET, is usually activated by repetitive movements of the opposite hand, during walking, and during mental distraction such as reciting the months of the year backwards.  Postural and kinetic tremors are best elicited with the arms held outstretched; with the shoulders abducted, elbows flexed, and index fingers held an inch apart in front of the face (to elicit any "wing-beating" tremor); during finger-to-nose maneuvers; and while drinking from or pouring from a paper cup. Writing and drawing may demonstrate the large, tremulous, angulated loops of ET or the micrographia of parkinsonism. A drawing sample of a spiral is commonly used to evaluate ET severity and can be repeated to be compared over time. Having the patient draw a spiral with the hand suspended above the table increases the sensitivity of this test.  While examining for postural tremor and kinetic tremor, careful observation for abnormal posturing of the tremulous fingers, hand, or " upper limb is required to rule out a dystonic tremor. Dystonic posturing can also be elicited during examination of repetitive movements of the opposite limb, writing, and walking.  Tremor of the leg should be assessed with the limb at rest, during heel-to-shin testing, and while standing and walking. Leg tremor is more commonly due to parkinsonism than ET.  Frequency of tremor should be estimated at rest and during postural and action maneuvers. Frequency varies depending on cause:  ?High for enhanced physiologic tremor (10 to 12 Hertz [Hz]) and orthostatic tremor (14 to 20 Hz)  ?Medium to high for ET (6 to 12 Hz)  ?Low to medium for the resting tremor of parkinsonism (4 to 6 Hz)  ?Low for rubral (Sanchez) tremor (3 to 5 Hz)  Functional tremors tend to vary in frequency as well as amplitude and direction from moment to moment and either become more irregular or subside entirely when the patient is distracted by performing a complex, repetitive motor task with the contralateral limb. The tremor may also entrain to the frequency of the repetitive movement being tested in another limb. For this reason, it can be helpful to test for entrainability by asking patients to tap their fingers at the same rhythm as the examiner, and then slowly increase or decrease the frequency of the taps, looking for associated changes in the rhythm of the tremor. (See 'Functional tremors' above.)  The gait is almost always normal in patients with ET, while it is characteristically narrow-based and shuffling in PD and wide-based and ataxic in cerebellar disorders. The gait may have histrionic (nonphysiologic) qualities in patients with functional tremors.    Laboratory studies -- The routine laboratory evaluation of tremor should include tests of thyroid function, diagnostic studies to exclude Luis Alfredo disease, and, rarely, screening for heavy metal poisoning such as mercury or arsenic if an environmental cause is suspected. Luis Alfredo disease  "should be suspected in anyone under age 40 who has tremor or other involuntary movements or postures [2] (see "Luis Alfredo disease: Diagnostic tests"). Hypoglycemia and pheochromocytoma may need to be ruled out in patients with enhanced physiologic tremor.  Brain imaging is usually not indicated in patients with classic presentations of ET, PD, enhanced physiologic tremor, or drug-induced tremor, although normal pressure hydrocephalus (NPH) or stroke can rarely produce a parkinsonian phenotype. In cases where there is clinical uncertainty as to whether a patient has ET or isolated PD tremor, striatal dopamine transporter imaging (DaTscan) will detect striatal dopamine deficiency even very early in PD but will be normal in ET. (See "Diagnosis and differential diagnosis of Parkinson disease".)  Magnetic resonance imaging (MRI) can be useful in patients thought to have rubral or Sanchez tremor, which is typically due to a lesion in the cerebellar outflow network; in those with Luis Alfredo disease; and in those with a pure hemidystonic tremor, which may be due to a contralateral structural lesion.  Quantitative computerized analysis of tremor is available in some tertiary care facilities, but its ability to reliably distinguish between tremor types has not been established.    TREATMENT -- The treatment of tremor is symptomatic, and some forms lack any useful pharmacologic therapy. Even medications known to have some benefit for certain tremor syndromes usually have only partial effect. For combined tremor syndromes, treatment of the underlying disorder is the primary approach.  Physical and occupational therapy may be of benefit to help identify coping mechanisms and compensatory strategies. In patients with refractory or disabling tremor, the approach should be individualized according to the underlying cause. In addition to medications, some patients may be candidates for surgical therapies.  ?Rest tremor - Rest tremor " "associated with Parkinson disease (PD) or other parkinsonian disorders is managed by treatment of the underlying disorder, typically with antiparkinson agents such as anticholinergic drugs, amantadine, dopamine agonists, levodopa, and zonisamide. Deep brain stimulation (DBS) is an option for some patients with refractory tremor due to PD, but not due to other parkinsonian disorders. This topic is discussed separately. (See "Initial pharmacologic treatment of Parkinson disease" and "Device-assisted and lesioning procedures for Parkinson disease".)  ?Enhanced physiologic tremor - Physiologic tremor is best managed by reduction or removal of the responsible offending medication or toxin (table 4); diagnosis and treatment of possible associated endocrine disorders; and dealing with stress, anxiety, or fatigue (see 'Physiologic tremor' above). Single doses of propranolol, a short-acting beta blocker, taken in anticipation of social situations that are likely to exacerbate tremor (eg, as with tremor associated with public speaking or having dinner with friends) are useful in some patients.  ?Essential tremor (ET) - ET can be treated pharmacologically with partial symptomatic benefit. Propranolol and primidone are the preferred, evidence-based drugs to use as initial monotherapy, and when used in combination they can sometimes offer additive benefit (algorithm 1). Second-line medications for ET include agents such as topiramate and gabapentin. Botulinum toxin injections can be effective for head tremor and vocal tremor in ET, and in selected cases for upper extremity tremor. Both DBS and unilateral thalamotomy, which can now be performed via focused ultrasound with MRI guidance, are effective for the treatment of medically refractory ET. (See "Essential tremor: Treatment and prognosis" and "Surgical treatment of essential tremor".)  ?Orthostatic tremor - Orthostatic tremor involves the legs and trunk and occurs exclusively " "while standing (see 'Orthostatic tremor' above). In one retrospective case series of 184 patients with orthostatic tremor, treatment with benzodiazepines, mainly clonazepam, was associated with moderate or marked relief in approximately one-third of the patients [11]. Other classes, such as antiseizure medications (eg, gabapentin, primidone, valproate) were associated with lower rates of improvement, and lack of a placebo control was a major limitation. Limited evidence suggests that some patients with medically refractory orthostatic tremor show modest improvement with DBS of the ventral intermediate nucleus of the thalamus [17].  ?Cerebellar tremor - Cerebellar tremors lack any useful pharmacotherapy. The rare patient with severe intention tremor and little or no ataxia, such as sometimes occurs in multiple sclerosis, may be helped by DBS of the ventral intermediate nucleus of the thalamus. (See "Surgical treatment of essential tremor".)  ?Rubral tremor - Rubral tremor sometimes responds to levodopa therapy [13,18]. Other medications that may have some benefit are levetiracetam and trihexyphenidyl; thalamic DBS has been used with some success for medically refractory patients but requires more study [19].  ?Dystonic tremor - Dystonic tremor of the head related to cervical dystonia can be improved with botulinum toxin injections into bilateral cervical muscles. If sternocleidomastoid muscles are injected bilaterally, small doses should be used to avoid dysphagia. (See "Treatment of dystonia in children and adults", section on 'Focal dystonia'.)    SOCIETY GUIDELINE LINKS -- Links to society and government-sponsored guidelines from selected countries and regions around the world are provided separately. (See "Society guideline links: Essential tremor".)  INFORMATION FOR PATIENTS -- [facility] offers two types of patient education materials, "The Basics" and "Beyond the Basics." The Basics patient education pieces are " "written in plain language, at the 5th to 6th grade reading level, and they answer the four or five key questions a patient might have about a given condition. These articles are best for patients who want a general overview and who prefer short, easy-to-read materials. Beyond the Basics patient education pieces are longer, more sophisticated, and more detailed. These articles are written at the 10th to 12th grade reading level and are best for patients who want in-depth information and are comfortable with some medical jargon.  Here are the patient education articles that are relevant to this topic. We encourage you to print or e-mail these topics to your patients. (You can also locate patient education articles on a variety of subjects by searching on "patient info" and the keyword(s) of interest.)  ?Basics topics (see "Patient education: Tremor (The Basics)")  ?Beyond the Basics topics (see "Patient education: Tremor (Beyond the Basics)")    SUMMARY AND RECOMMENDATIONS  ?Classification - Clinical classification of tremor is based on history, tremor characteristics, associated neurologic and systemic signs, and, in some cases, additional testing. A vast number of diseases, disorders, medications, toxins, and substances cause tremor. Etiologies can be broadly classified as genetic, acquired, and idiopathic. (See 'Classification' above.)  ?Rest versus action - The activating conditions that give rise to a tremor are a key distinguishing feature (table 1). Rest tremor occurs in a body part that is fully supported and not voluntarily activated, whereas action tremor occurs with voluntary muscle contraction. Action tremors can be further characterized as kinetic, postural, and isometric. (See 'Rest versus action' above.)  ?Causes of rest tremor - Parkinson disease (PD) and other parkinsonian syndromes are the most common causes of rest tremor (table 2). (See 'Rest tremors' above.)  ?Causes of action tremor - Examples of " isolated tremor syndromes in which action tremor can be the sole neurologic symptom include (table 2):  Enhanced physiologic tremor (table 4) (see 'Physiologic tremor' above)  Essential tremor (ET) (table 5) (see 'Essential tremor' above)  Task-specific tremors (eg, writing tremor) (see 'Primary writing tremor' above)  Orthostatic tremor (see 'Orthostatic tremor' above)  Examples of combined tremor syndromes in which action tremor may be seen in association with other neurologic or systemic signs and symptoms include:  Cerebellar tremor (see 'Cerebellar tremor' above)  Neuropathic tremor (see 'Neuropathic tremor' above)  Dystonic tremor (see 'Dystonic tremor' above)  Functional tremor (see 'Functional tremors' above)  ?Evaluation - A detailed neurologic examination is important to identify specific features of the tremor (including its frequency, amplitude, pattern, and body distribution), activating conditions, and other neurologic findings, if present. (See 'Evaluation' above.)  The routine laboratory evaluation of tremor should include tests of thyroid function, diagnostic studies to exclude Luis Alfredo disease, and, rarely, screening for heavy metal poisoning such as mercury or arsenic if an environmental cause is suspected. Brain imaging is usually not indicated in patients with classic presentations of ET, PD, enhanced physiologic tremor, or drug-induced tremor. (See 'Laboratory studies' above.)  ?Treatment - Physical and occupational therapy may be of benefit in patients with limb tremor to help identify coping mechanisms and compensatory strategies. Some forms of tremor are responsive to symptomatic pharmacotherapy. (See 'Treatment' above.)
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
## 2024-08-20 ENCOUNTER — OFFICE VISIT (OUTPATIENT)
Dept: NEUROLOGY | Facility: CLINIC | Age: 54
End: 2024-08-20
Payer: COMMERCIAL

## 2024-08-20 VITALS — WEIGHT: 190 LBS | BODY MASS INDEX: 27.2 KG/M2 | HEIGHT: 70 IN

## 2024-08-20 DIAGNOSIS — G25.0 ESSENTIAL TREMOR: ICD-10-CM

## 2024-08-20 DIAGNOSIS — G25.81 RLS (RESTLESS LEGS SYNDROME): Primary | ICD-10-CM

## 2024-08-20 DIAGNOSIS — G25.3 MYOCLONUS: ICD-10-CM

## 2024-08-20 PROCEDURE — 3008F BODY MASS INDEX DOCD: CPT | Mod: CPTII,S$GLB,, | Performed by: NURSE PRACTITIONER

## 2024-08-20 PROCEDURE — 1160F RVW MEDS BY RX/DR IN RCRD: CPT | Mod: CPTII,S$GLB,, | Performed by: NURSE PRACTITIONER

## 2024-08-20 PROCEDURE — 1159F MED LIST DOCD IN RCRD: CPT | Mod: CPTII,S$GLB,, | Performed by: NURSE PRACTITIONER

## 2024-08-20 PROCEDURE — 99999 PR PBB SHADOW E&M-EST. PATIENT-LVL III: CPT | Mod: PBBFAC,,, | Performed by: NURSE PRACTITIONER

## 2024-08-20 PROCEDURE — 99214 OFFICE O/P EST MOD 30 MIN: CPT | Mod: S$GLB,,, | Performed by: NURSE PRACTITIONER

## 2024-08-20 RX ORDER — PROPRANOLOL HYDROCHLORIDE 80 MG/1
80 CAPSULE, EXTENDED RELEASE ORAL DAILY
Qty: 90 CAPSULE | Refills: 4 | Status: SHIPPED | OUTPATIENT
Start: 2024-08-20 | End: 2025-11-13

## 2024-10-28 ENCOUNTER — OFFICE VISIT (OUTPATIENT)
Dept: NEUROSURGERY | Facility: CLINIC | Age: 54
End: 2024-10-28
Payer: COMMERCIAL

## 2024-10-28 ENCOUNTER — HOSPITAL ENCOUNTER (OUTPATIENT)
Dept: RADIOLOGY | Facility: HOSPITAL | Age: 54
Discharge: HOME | End: 2024-10-28
Attending: NURSE PRACTITIONER
Payer: COMMERCIAL

## 2024-10-28 VITALS
BODY MASS INDEX: 27.92 KG/M2 | RESPIRATION RATE: 16 BRPM | SYSTOLIC BLOOD PRESSURE: 126 MMHG | DIASTOLIC BLOOD PRESSURE: 85 MMHG | WEIGHT: 195 LBS | HEIGHT: 70 IN | HEART RATE: 62 BPM

## 2024-10-28 DIAGNOSIS — M47.22 CERVICAL SPONDYLOSIS WITH RADICULOPATHY: ICD-10-CM

## 2024-10-28 DIAGNOSIS — M54.12 CERVICAL RADICULITIS: ICD-10-CM

## 2024-10-28 DIAGNOSIS — M79.10 MUSCLE TENSION PAIN: Primary | ICD-10-CM

## 2024-10-28 PROCEDURE — 3008F BODY MASS INDEX DOCD: CPT | Mod: CPTII,,, | Performed by: NURSE PRACTITIONER

## 2024-10-28 PROCEDURE — 1160F RVW MEDS BY RX/DR IN RCRD: CPT | Mod: CPTII,,, | Performed by: NURSE PRACTITIONER

## 2024-10-28 PROCEDURE — 3079F DIAST BP 80-89 MM HG: CPT | Mod: CPTII,,, | Performed by: NURSE PRACTITIONER

## 2024-10-28 PROCEDURE — 1159F MED LIST DOCD IN RCRD: CPT | Mod: CPTII,,, | Performed by: NURSE PRACTITIONER

## 2024-10-28 PROCEDURE — 99214 OFFICE O/P EST MOD 30 MIN: CPT | Mod: ,,, | Performed by: NURSE PRACTITIONER

## 2024-10-28 PROCEDURE — 72052 X-RAY EXAM NECK SPINE 6/>VWS: CPT | Mod: TC

## 2024-10-28 PROCEDURE — 3074F SYST BP LT 130 MM HG: CPT | Mod: CPTII,,, | Performed by: NURSE PRACTITIONER

## 2024-11-05 ENCOUNTER — PATIENT MESSAGE (OUTPATIENT)
Dept: NEUROLOGY | Facility: CLINIC | Age: 54
End: 2024-11-05
Payer: COMMERCIAL

## 2024-12-27 ENCOUNTER — HOSPITAL ENCOUNTER (EMERGENCY)
Facility: HOSPITAL | Age: 54
Discharge: HOME | End: 2024-12-27
Attending: EMERGENCY MEDICINE
Payer: COMMERCIAL

## 2024-12-27 VITALS
HEART RATE: 68 BPM | OXYGEN SATURATION: 98 % | WEIGHT: 190 LBS | TEMPERATURE: 98 F | DIASTOLIC BLOOD PRESSURE: 81 MMHG | HEIGHT: 70 IN | SYSTOLIC BLOOD PRESSURE: 132 MMHG | RESPIRATION RATE: 18 BRPM | BODY MASS INDEX: 27.2 KG/M2

## 2024-12-27 DIAGNOSIS — S61.311A LACERATION OF LEFT INDEX FINGER WITHOUT FOREIGN BODY WITH DAMAGE TO NAIL, INITIAL ENCOUNTER: Primary | ICD-10-CM

## 2024-12-27 PROCEDURE — 99283 EMERGENCY DEPT VISIT LOW MDM: CPT | Mod: 25

## 2024-12-27 RX ORDER — HYDROCODONE BITARTRATE AND ACETAMINOPHEN 5; 325 MG/1; MG/1
1 TABLET ORAL EVERY 8 HOURS PRN
Qty: 9 TABLET | Refills: 0 | Status: SHIPPED | OUTPATIENT
Start: 2024-12-27 | End: 2024-12-30

## 2024-12-27 NOTE — ED PROVIDER NOTES
Encounter Date: 12/27/2024       History     Chief Complaint   Patient presents with    Laceration     Patient reports laceration to left pointer finger while cutting onions 2 days ago.      See MDM    The history is provided by the patient. No  was used.     Review of patient's allergies indicates:   Allergen Reactions    Wheat containing prod Other (See Comments)     Rhinitis     Past Medical History:   Diagnosis Date    ADHD     Arthritis     Burning sensation     right arm    Cervical radiculitis     Cervical spondylosis with radiculopathy     Disc displacement, lumbar     Exhaustion     Fatigue     PONV (postoperative nausea and vomiting)     Right arm numbness     Right carpal tunnel syndrome     Tremor      Past Surgical History:   Procedure Laterality Date    COLONOSCOPY      ELBOW SURGERY Left     EPIDURAL STEROID INJECTION INTO CERVICAL SPINE      EPIDURAL STEROID INJECTION INTO CERVICAL SPINE N/A 02/23/2023    Procedure: INJECTION, STEROID, SPINE, CERVICAL, EPIDURAL C7-T1;  Surgeon: Lamar Villanueva MD;  Location: [facility] OR;  Service: Pain Management;  Laterality: N/A;    EPIDURAL STEROID INJECTION INTO CERVICAL SPINE N/A 04/13/2023    Procedure: INJECTION, STEROID, SPINE, CERVICAL, EPIDURAL C7 T1;  Surgeon: Lamar Villanueva MD;  Location: [facility] OR;  Service: Pain Management;  Laterality: N/A;  C7 T1    EPIDURAL STEROID INJECTION INTO LUMBAR SPINE      LUMBAR FUSION      MICRODISCECTOMY OF SPINE      REPAIR OF SUPERIOR LABRAL ANTERIOR-TO-POSTERIOR (SLAP) TEAR OF SHOULDER Right 2021    ROTATOR CUFF REPAIR Right 2021    SURGICAL REMOVAL OF BONE SPUR      x2 right shoulder    TOTAL REPLACEMENT OF CERVICAL INTERVERTEBRAL DISC N/A 10/26/2023    Procedure: REPLACEMENT, INTERVERTEBRAL DISC, CERVICAL, TOTAL;  Surgeon: Jeremy Silva MD;  Location: [facility] OR;  Service: Neurosurgery;  Laterality: N/A;  C5-6 TDR, ACDF if necessary  NTI    VASECTOMY       Family History   Problem Relation Name Age of  Onset    Diabetes Mother      Hypertension Mother      Stroke Mother      Diabetes Father      Hyperlipidemia Father      Hypertension Father       Social History     Tobacco Use    Smoking status: Never    Smokeless tobacco: Never   Substance Use Topics    Alcohol use: Not Currently     Alcohol/week: 3.0 standard drinks of alcohol     Types: 1 Glasses of wine, 1 Cans of beer, 1 Shots of liquor per week     Comment: occasionally    Drug use: Never     Review of Systems   Constitutional:         Laceration to L pointer finger    All other systems reviewed and are negative.      Physical Exam     Initial Vitals [12/27/24 1153]   BP Pulse Resp Temp SpO2   132/81 68 18 97.9 °F (36.6 °C) 98 %      MAP       --         Physical Exam    Nursing note and vitals reviewed.  Constitutional: He appears well-developed and well-nourished.   HENT:   Head: Normocephalic and atraumatic.   Eyes: EOM are normal.   Neck:   Normal range of motion.  Cardiovascular:  Intact distal pulses.           Pulmonary/Chest: Effort normal.   Musculoskeletal:         General: Normal range of motion.      Left hand: Laceration and tenderness present. No swelling or bony tenderness. Normal range of motion. Normal strength. Normal sensation. Normal capillary refill. Normal pulse.        Hands:       Cervical back: Normal range of motion.      Comments: Laceration with minor soft tissue avulsion to distal left 2nd digit involving the top, distal corner of his nail bed.  Exposed Tissue appears healthy and without signs of infection.     Neurological: He is alert and oriented to person, place, and time.   Skin: Skin is warm and dry. Capillary refill takes less than 2 seconds.   Psychiatric: He has a normal mood and affect.         ED Course   Procedures  Labs Reviewed - No data to display       Imaging Results              X-Ray Finger 2 or More Views Left (Final result)  Result time 12/27/24 12:58:45   Procedure changed from X-Ray Finger 2 or More Views  Right     Final result by Rose Hernandez MD (12/27/24 12:58:45)                   Impression:      No acute bony abnormality.      Electronically signed by: Rose Hernandez  Date:    12/27/2024  Time:    12:58               Narrative:    EXAMINATION:  XR FINGER 2 OR MORE VIEWS LEFT    CLINICAL HISTORY:  laceration;    COMPARISON:  None.    FINDINGS:  There is no acute fracture or malalignment.  There is a small soft tissue laceration of the 2nd digit.                                       Medications - No data to display  Medical Decision Making  The patient is a 54 y.o. male with a pertinent PMHX of nothing who presents to the Emergency Department with his wife with a chief complaint of laceration to L pointer finger. Symptoms began 2 days ago and have been constant since onset. The finger pain is currently rated as a 9/10 in severity and described as sharp, burning with no radiation. Associated symptoms include nothing. Symptoms are aggravated with nothing and alleviated with nothing. The patient denies purulent discharge, fever, numbness, tingling, changes in skin color. They report taking Advil 800 prior to arrival with not much relief of symptoms. No other reported symptoms at this time.    Pertinent physical exam findings include Laceration with minor soft tissue avulsion to distal left 2nd digit involving the top, distal corner of his nail bed.  Exposed Tissue appears healthy and without signs of infection. TTP to the L distal 2nd finger.  Vital signs WNL.    Left finger XR with no acute bony abnormalities.    Imaging findings discussed with patient.  Will prescribed medication for pain and provide patient with wound care instructions and a finger splint.  Patient verbalized understanding and agreement of plan.  Discharge instructions and return precautions provided.  All questions answered.      Amount and/or Complexity of Data Reviewed  Radiology: ordered. Decision-making details documented in ED  Course.      Additional MDM:   Differential Diagnosis:   Other: The following diagnoses were also considered and will be evaluated: Contusion, Finger Fracture and Abrasions.                                   Clinical Impression:  Final diagnoses:  [S61.311A] Laceration of left index finger without foreign body with damage to nail, initial encounter (Primary)          ED Disposition Condition    Discharge Stable          ED Prescriptions       Medication Sig Dispense Start Date End Date Auth. Provider    HYDROcodone-acetaminophen (NORCO) 5-325 mg per tablet Take 1 tablet by mouth every 8 (eight) hours as needed for Pain. 9 tablet 12/27/2024 12/30/2024 Chio Trimble PA-C          Follow-up Information       Follow up With Specialties Details Why Contact Info    Yan Mahajan MD Internal Medicine Call in 1 week As needed [address]  [facility] [id number]  [phone]               Chio Trimble PA-C  12/27/24 9700

## 2024-12-27 NOTE — DISCHARGE INSTRUCTIONS
Take ibuprofen or Tylenol as needed for pain.  For more severe pain take [place].  Keep your wound clean and dry.  See attached wound care instructions for further information.  If you experience any new or worsening symptoms return to the emergency department or see your primary care provider.

## 2025-02-13 ENCOUNTER — OFFICE VISIT (OUTPATIENT)
Dept: NEUROLOGY | Facility: CLINIC | Age: 55
End: 2025-02-13
Payer: COMMERCIAL

## 2025-02-13 VITALS
BODY MASS INDEX: 26.48 KG/M2 | SYSTOLIC BLOOD PRESSURE: 112 MMHG | WEIGHT: 185 LBS | HEIGHT: 70 IN | DIASTOLIC BLOOD PRESSURE: 68 MMHG

## 2025-02-13 DIAGNOSIS — G25.81 RLS (RESTLESS LEGS SYNDROME): ICD-10-CM

## 2025-02-13 DIAGNOSIS — G25.0 ESSENTIAL TREMOR: Primary | ICD-10-CM

## 2025-02-13 PROCEDURE — 99214 OFFICE O/P EST MOD 30 MIN: CPT | Mod: S$GLB,,, | Performed by: NURSE PRACTITIONER

## 2025-02-13 PROCEDURE — 3078F DIAST BP <80 MM HG: CPT | Mod: CPTII,S$GLB,, | Performed by: NURSE PRACTITIONER

## 2025-02-13 PROCEDURE — 1159F MED LIST DOCD IN RCRD: CPT | Mod: CPTII,S$GLB,, | Performed by: NURSE PRACTITIONER

## 2025-02-13 PROCEDURE — 99999 PR PBB SHADOW E&M-EST. PATIENT-LVL III: CPT | Mod: PBBFAC,,, | Performed by: NURSE PRACTITIONER

## 2025-02-13 PROCEDURE — 3074F SYST BP LT 130 MM HG: CPT | Mod: CPTII,S$GLB,, | Performed by: NURSE PRACTITIONER

## 2025-02-13 PROCEDURE — 3008F BODY MASS INDEX DOCD: CPT | Mod: CPTII,S$GLB,, | Performed by: NURSE PRACTITIONER

## 2025-02-13 PROCEDURE — 1160F RVW MEDS BY RX/DR IN RCRD: CPT | Mod: CPTII,S$GLB,, | Performed by: NURSE PRACTITIONER

## 2025-02-13 RX ORDER — EPINEPHRINE 0.3 MG/.3ML
INJECTION SUBCUTANEOUS
COMMUNITY
Start: 2024-08-31

## 2025-02-13 RX ORDER — GABAPENTIN 300 MG/1
300 CAPSULE ORAL
COMMUNITY
Start: 2025-02-04

## 2025-02-13 RX ORDER — DEXTROAMPHETAMINE SACCHARATE, AMPHETAMINE ASPARTATE, DEXTROAMPHETAMINE SULFATE AND AMPHETAMINE SULFATE 2.5; 2.5; 2.5; 2.5 MG/1; MG/1; MG/1; MG/1
1 TABLET ORAL 3 TIMES DAILY
COMMUNITY
Start: 2025-01-07

## 2025-02-13 NOTE — PATIENT INSTRUCTIONS
"INTRODUCTION   Tremor is defined as an involuntary, rhythmic, and oscillatory movement of a body part [1]. It is caused by either alternating or synchronous contractions of antagonistic muscles. Tremor is the most common of all movement disorders, occurring from time to time in many normal individuals in the form of exaggerated physiologic tremor [2].  This topic will provide an overview of the classification, clinical features, diagnostic evaluation, and treatment of tremor, including essential tremor (ET), which also is discussed in greater detail separately. (See "Essential tremor: Clinical features and diagnosis" and "Essential tremor: Treatment and prognosis" and "Surgical treatment of essential tremor".)    CLASSIFICATION -- Tremors have been variably defined and categorized over time, and classification is made difficult by overlapping clinical characteristics and etiologies. The most common distinction is based on the activating conditions (ie, at rest versus action), but other clinical characteristics, such as frequency and body distribution, also guide the evaluation and etiologic diagnosis.  A clinical and etiologic classification scheme has been proposed by the International Parkinson and Movement [employer] Society [1]. It provides a framework upon which currently recognized and new tremor syndromes can be defined.  Clinical characteristics -- Clinical classification of tremor is based on history, tremor characteristics, associated neurologic and systemic signs, and, in some cases, additional testing.  Rest versus action -- The activating conditions that give rise to a tremor are a key distinguishing feature (table 1).  Rest tremor occurs in a body part that is fully supported, relaxed, and not voluntarily activated.  Action tremor occurs with voluntary muscle contraction and is further subdivided into three types:  ?Kinetic tremor occurs during any voluntary movement. Kinetic tremors include:  Simple " kinetic tremor, in which tremor is roughly the same throughout the course of a voluntary movement  Intention tremor, characterized by a crescendo increase in tremor as the affected body part approaches its target  Task-specific kinetic tremor, which occurs during a specific task such as writing  ?Postural tremor occurs when a specific posture or position, such as holding the arms outstretched or while standing, is voluntarily maintained.  ?Isometric tremor occurs during muscle contraction against a stationary object, such as making a fist or squeezing an object.  Tremor frequency and body distribution -- Tremor frequency (oscillations per second) is used to describe tremor, although it is not particularly helpful in diagnosis as most pathologic tremors have a frequency of 4 to 8 Hertz (Hz) [1]. The most common subdivisions used for descriptive purposes are <4, 4 to 8, 8 to 12, and >12 Hz. Tremor frequency is typically estimated clinically but can be accurately measured using a motion transducer or electromyography (EMG).  The anatomic distribution of tremor can be classified as:  ?Focal (only one body region is affected, such as voice, head, jaw, or one limb)  ?Segmental (two or more contiguous body parts in the upper or lower body are affected, such as head and arm)  ?Hemitremor (one side of the body is affected)  ?Generalized (upper and lower body are affected)  Isolated versus combined -- Tremor syndromes are specific combinations of clinical signs and symptoms that commonly coexist. Syndromes can help narrow the search for an etiologic diagnosis. Syndromes may be isolated, when tremor is the only neurologic manifestation, or combined, when other systemic or neurologic signs coexist with the tremor (table 2) [1]. Importantly, syndromes may have multiple etiologies, and a particular etiology may produce multiple clinical syndromes.  Etiology -- A vast number of diseases, disorders, medications, toxins, and  "substances cause tremor. Etiologies can be broadly classified as genetic, acquired, and idiopathic.  Some of the more common etiologies of resting and action tremors are listed in the table (table 3). In addition to these, there are many uncommon or rare conditions that may be associated with tremor; most are action tremors [3].    REST TREMORS -- Parkinson disease (PD) and other parkinsonian syndromes are the most common causes of rest tremor.  General features -- Rest tremor is evident with the affected body part fully supported against gravity and completely at rest. It is temporarily dampened or abolished during voluntary activity. Rest tremors usually fluctuate in amplitude and may appear and disappear depending upon the degree of patient repose, whether the patient feels that they are under observation, and other factors, including stress, excitement, or recent exercise.  Rest tremor is typically less disabling than postural or kinetic tremors because of its absence during voluntary activity. However, a rest tremor may quickly reappear as soon as the body part assumes a new resting or antigravity posture (a phenomenon referred to as "re-emergent tremor"), and in this case will interfere with the use of eating utensils, handwriting, typing, and other purposeful postures or movements. Rest tremor with these postural features is more of a handicap or disability than pure rest tremor, which is of mainly cosmetic concern.  Dystonic tremor is usually postural or task specific but it can occur at rest, in which state it tends to be jerky and irregular, unlike the smoother, more rhythmic pattern of the tremor of parkinsonism. (See 'Dystonic tremor' below.)  On examination, when rest tremor is absent or minimally apparent, it usually can be activated or enhanced by having the patient walk, perform repetitive movements of the opposite limb, or perform a cognitive task, such as naming the months of the year " "backwards.  Idiopathic Parkinson disease -- The rest tremor in PD typically appears first in one hand or less commonly in one leg. As the disease progresses, tremor may spread from one hand to involve the ipsilateral leg and/or the contralateral arm. Tremor of the leg or foot is more commonly due to PD than to essential tremor (ET). The face, lips, and jaw may be involved but, unlike ET or cerebellar disease, PD only occasionally produces tremor of the entire head. (See "Clinical manifestations of Parkinson disease", section on 'Tremor'.)  When the tremor in PD is limited to distal muscles of the hand, it may produce a characteristic "pill-rolling" pattern, with a frequency of 4 to 6 Hertz (Hz). With increasing severity, the tremor may become more continuous, larger in amplitude, and more proximal in distribution, but the frequency remains constant.  Postural tremor may coexist with rest tremor or may be present by itself in some patients with PD, sometimes leading to an incorrect diagnosis of ET. (See 'Essential tremor' below.)  Tremor-dominant Parkinson disease -- A low-amplitude rest tremor of the hand or jaw, unaccompanied by other manifestations of parkinsonism, sometimes occurs as an isolated finding and may not progress to more generalized PD. However, tremor is such a common first sign of PD that it most often is followed by progressive and more disabling symptoms such as generalized bradykinesia, gait disturbance, and postural instability.  Patients can go many years into their disease with tremor as the only or the most prominent sign of their disorder. When this happens, this is referred to as "tremor-dominant PD," although this designation is most reliably applied in retrospect, when the relatively benign course has already been charted.  Isolated rest tremor (SWEDD) -- The radiographic term "SWEDD" (Scans Without Evidence of Dopaminergic Deficit) has been used to designate patients with relatively " "isolated rest and action tremors of the arms, resembling those of early PD, that have failed to evolve over time into more generalized PD [4]. Unlike patients with PD, these individuals lack evidence for nigrostriatal dopamine deficiency with dopamine transporter imaging (DaTscan). Patients with SWEDD sometimes exhibit reduced arm swing and mild focal dystonia on the affected side, and may have jaw or head tremor or facial hypomimia, but no signs of parkinsonian bradykinesia [4]. They are therefore to be distinguished from individuals with tremor-dominant PD. (See "Diagnosis and differential diagnosis of Parkinson disease", section on 'Scans without evidence of dopaminergic deficit (SWEDD)'.)    Others -- Other disorders associated with rest tremor include drug-induced parkinsonism secondary to dopamine receptor blocking agents; the atypical parkinsonian syndromes such as dementia with Lewy bodies, multiple system atrophy, and progressive supranuclear palsy; Luis Alfredo disease; non-Wilsonian hepatocerebral degeneration; and thalamic or midbrain injury due to stroke, trauma, or demyelinating disease. Rest tremor may also occur as a "spillover" phenomenon in a variety of disorders in which very severe action tremors predominate, such as Luis Alfredo disease, multiple sclerosis, severe forms of ET [5], and other cerebellar or extrapyramidal syndromes.  Rubral tremor and dystonic tremor are combined tremor syndromes in which the tremor can have a rest component but is often mixed with postural and kinetic features as well. (See 'Rubral tremor' below and 'Dystonic tremor' below.)    ACTION TREMORS -- Action tremors are the largest group of tremors. They include postural tremors, elicited during examination with the arms suspended against gravity in a fixed posture, and kinetic tremors, which emerge during the course of goal-directed activity (eg, drinking from a glass of water, pouring water from one cup into another, writing, and " "drawing of a spiral).  Physiologic tremor -- Normal individuals have a very low-amplitude, high-frequency physiologic tremor of approximately 10 to 12 Hertz (Hz). Physiologic tremor is usually not visible under ordinary circumstances, although some people have a natural proclivity to demonstrate mild, nondisabling physiologic tremulousness. Many factors can enhance the tremor to the point of detection, most often by increasing sympathetic activity [2].  ?Medications - Common drugs that increase adrenergic activity include beta-adrenergic agonists such as terbutaline, albuterol, isoproterenol, epinephrine, amphetamines, selective serotonin reuptake inhibitors (SSRIs) and tricyclic antidepressants, levodopa, nicotine, and xanthines such as theophylline and caffeine (table 4).  ?High adrenergic state - Anxiety, excitement, fright, muscle fatigue, hypoglycemia, alcohol and opioid withdrawal, thyrotoxicosis, fever, and pheochromocytoma also enhance adrenergic activity. (See "Neurologic manifestations of hyperthyroidism and Graves' disease", section on 'Tremor'.)  Enhancement of physiologic tremor is the most common cause of action tremor. Thus, a medical rather than primary neurologic cause for action tremor should be considered first in most cases.  Drug-induced tremor -- Drugs and toxins that commonly increase physiologic tremor or induce tremor by other means include lithium, antidepressants, corticosteroids, sodium valproate, tacrolimus, bromides, mercury, lead, and arsenic (table 4).  Essential tremor -- Essential tremor (ET) is reviewed here briefly and is discussed in detail separately. (See "Essential tremor: Clinical features and diagnosis" and "Essential tremor: Treatment and prognosis" and "Surgical treatment of essential tremor".)  ET is the most common neurologic disorder that causes action tremor, with an estimated prevalence worldwide of up to 5 percent of the population. The incidence of ET increases with " "age, although it often affects middle-aged individuals, especially when it is familial. A family history is present in 30 to 70 percent of cases, and evidence suggests an autosomal dominant pattern of inheritance with reduced penetrance. (See "Essential tremor: Clinical features and diagnosis", section on 'Pathogenesis and genetics'.)  ET varies from a low-amplitude, high-frequency postural tremor of the hands to a much larger-amplitude, and at times lower-frequency, postural and action tremor that is activated by particular postures and actions. ET most often affects the hands and arms and can be asymmetric. Less often, it may involve the head, voice, trunk, and legs. ET becomes immediately apparent in the arms when they are held outstretched; it typically increases at the very end of goal-directed movements such as drinking from a glass or finger-to-nose testing. Tremor in the legs is unusual in ET. Tremor of the head may be vertical ("yes-yes") or horizontal ("no-no") and is usually associated with tremor of the hand or voice. ET is typically relieved by small amounts of alcohol. Caffeine can worsen the tremor, although this effect is much more commonly seen with physiologic tremor.  The diagnosis of ET is based upon clinical features (table 5). (See "Essential tremor: Clinical features and diagnosis", section on 'Evaluation'.)  The main considerations in the differential diagnosis of ET are enhanced physiologic tremor, drug-induced tremor, parkinsonian tremor, tremor caused by cerebellar disorders, and dystonic tremor.  A frequent entity on the differential diagnosis of ET is parkinsonian tremor (table 6 and table 7 and table 8). Differentiation from classic resting tremor should be straightforward; however, some patients with Parkinson disease (PD) also have an action tremor indistinguishable from ET. In fact, it is not unusual for patients with PD to present with a postural tremor before they display other " "signs of PD. Likewise, patients with severe ET may have a rest component to their tremor, and some patients with ET exhibit mild nonprogressive parkinsonism.    Primary writing tremor -- Many action tremors are particularly severe during the act of writing. Tremor that occurs exclusively while writing, and not during other voluntary motor activities, is referred to as "primary writing tremor" [6]. This tremor is limited to the hand and causes relatively large-amplitude supination-pronation movements at a frequency of 5 to 6 Hz. The low frequency and large amplitude of the tremor, its frequent occurrence in writer's "cramp" or writer's dystonia, its relative resistance to propranolol, and its occasional response to anticholinergic drugs suggest a closer relationship to dystonia than to ET. (See "Etiology, clinical features, and diagnostic evaluation of dystonia", section on 'Task-specific dystonia'.)    Orthostatic tremor -- Orthostatic tremor is a postural tremor limited to the legs and trunk that occurs exclusively while standing [7-10]. In two reports of 184 and 68 patients, there was a female predominance (64 and 77 percent, respectively) [11,12]. The mean age of onset was between 54 and 59 years (range 13 to 85 years). Both high- and low-frequency orthostatic tremors have been described; their relationship to ET is uncertain. In cases of high-frequency tremor, movements of the legs may be so low in amplitude that they initially escape clinical detection.    Cerebellar tremor -- "Cerebellar tremor" is the term used to describe a tremor that is caused by known cerebellar pathology. It can be postural, simple kinetic, intention, or any combination of these features. In severe cases, cerebellar tremor, like ET, can spill over and also occur at rest. Tremor frequency is typically low (3 to 4 Hz) and can be associated with ataxia and dysmetria.  Cerebellar tremor is due to disturbances anywhere along the path of the " "cerebellar outflow projection system from the dentate nucleus of the cerebellum to its termination in the ventral lateral nucleus of the thalamus. The most common causes are multiple sclerosis, midbrain trauma, and stroke (table 3). Degenerative and inflammatory diseases of the cerebellum, severe forms of ET, Luis Alfredo disease, hepatocerebral degeneration, and mercury poisoning may also produce this tremor.  Cerebellar tremor typically has a component of intention tremor in that the tremor increases in severity, either gradually or abruptly, as the hand moves closer to its target. Intention tremors are usually very large in amplitude due to involvement of proximal muscles and are sometimes difficult to distinguish from severe cerebellar ataxia. The frequent association with ataxia, dysmetria, titubation, and other cerebellar signs serves to identify the cerebellar origin of intention tremor.  Titubation of the head and neck ("to and fro" movements) may be associated with cerebellar tremor; it is distinguished from essential head tremor by the presence of other cerebellar findings.    Rubral tremor -- Rubral tremor, sometimes called Sanchez tremor after the early 20th century English neurologist who described it, is a subtype of cerebellar tremor with a greater degree of proximal, high-amplitude movement. Rubral tremor is caused by midbrain, thalamic, cerebellar, or pontine injury [13]. In many cases of rubral tremor, there are combined lesions that interrupt the outflow pathway from the cerebellum to the motor thalamus, often involving the superior cerebellar peduncle, substantia nigra, or red nucleus.  Rubral tremor is present at rest but tends to have a slower frequency (3 to 5 Hz) than typical Parkinson rest tremor (5 to 7 Hz). It also persists unchanged or increases with postural changes or goal-directed activity. It may produce a combination of rest, postural, simple kinetic, and intention tremors.  Neuropathic " "tremor -- Postural or kinetic tremors are sometimes associated with large fiber peripheral neuropathy. This association is most commonly observed in hereditary neuropathies, during the recovery phase of some cases of Guillain-Barré syndrome, and in chronic inflammatory demyelinating polyneuropathy (CIDP). Muscle weakness and loss of proprioceptive or muscle spindle inputs may account for the tremor. The frequency and amplitude of neuropathic tremor may vary greatly when associated with a proprioceptive deficit. (See "Overview of polyneuropathy".)    Dystonic tremor -- Dystonic tremor refers to tremor in a body part that is affected by dystonia. Because of the association with dystonia, dystonic tremor is an example of a combined tremor syndrome [1]. Common examples include dystonic head tremor and segmental tremulous dystonia of the head and upper limbs, which occur due to cervical dystonia. (See "Etiology, clinical features, and diagnostic evaluation of dystonia".)  Dystonic tremors are usually postural or task specific but sometimes occur at rest, in which state they tend to be jerky and irregular. The use of an alleviating maneuver (geste antagoniste, sensory trick) can be helpful in distinguishing dystonic tremor from other tremor syndromes, such as ET. In dystonic head tremor, for example, moving an arm to the face or head in a specific plane can alleviate cervical dystonia symptoms, including head tremor. Exacerbation of dystonic tremor by an attempt to maintain certain postures can also be helpful in the examination.    FUNCTIONAL TREMORS -- Functional tremor, previously known as psychogenic tremor, is typically characterized by complex rest, postural, and kinetic tremor with abrupt onset, a static course, changeable features, functional disability out of proportion to tremor magnitude, and resistance to treatment. Any body part may be involved, but, remarkably, the fingers are often spared, with much of the " "tremor of the arm occurring at the wrist.  Other features of functional movement disorders are often present, such as distractibility, variability of symptoms, and clinical features that are incongruous with known tremor syndromes. Examination usually shows variable tremor direction and frequency, distractibility (attenuation or elimination of tremor with distraction maneuvers such as repetitive tapping tasks with an uninvolved opposite hand or foot), and/or tremor entrainment (ie, shift of tremor frequency to the speed of a distraction maneuver). (See "Functional movement disorders", section on 'Clinical features'.)    EVALUATION -- The diagnostic approach to patients with tremor involves the history, physical examination, and selected laboratory studies [14]. Action tremor is most common and, of these, essential tremor (ET) and enhanced physiologic tremor are the most frequent causes [15]. Patients with tremor due to other disorders such as hyperthyroidism, Parkinson disease (PD), dystonia, Luis Alfredo disease, or medications frequently have additional signs or symptoms that help point to the diagnosis, although this is not always the case. The criteria for ET (table 5) and methods for distinguishing it from other tremors are shown in the tables (table 1 and table 3 and table 6 and table 7 and table 8).    History -- The history concerning the age of onset and evolution of tremor is usually straightforward, since it is a highly visible symptom that is readily evident to the patient and family members. Examination of previous handwriting samples may be useful in determining the precise time of onset.  Body parts that are affected and symmetry can be helpful clues to the likely diagnosis. Head tremor is common in ET but unusual in PD; conversely, jaw tremor and resting leg tremor are common in PD and rare in ET. Jaw or chin tremor at rest should be distinguished from lower lip tremor that occurs with activation, as " facial tremor with activation may be seen in ET. Vocal tremor is strongly suggestive of ET. Asymmetry or unilaterality is typically more characteristic of PD tremor than ET. Any involuntary posturing that occurs with tremor should raise the possibility of a dystonic tremor.  Precipitating, aggravating, or relieving factors such as caffeine, alcohol, medications, exercise, fatigue, or stress should be elicited; a complete list of all medications should be reviewed to exclude the possibility of enhanced physiologic tremor and drug-induced tremor (see 'Physiologic tremor' above). Mitigation of tremor by alcohol is classically described in ET but is not highly sensitive or specific.  Patients should also be questioned about any associated neurologic symptoms that would assist in diagnosis, including loss of dexterity, stiffness, stooped posture, voice softness, shuffling gait, acting out of dreams (all suggestive of PD), cognitive changes, and visual hallucinations (suggestive of dementia with Lewy bodies). Lack of any of the above features along with a predominantly action tremor would be suggestive of ET.  Family history in ET reflects an autosomal dominant pattern of inheritance in approximately half of all patients. Parkinsonian tremor is usually sporadic, but a family history of PD is present in approximately 15 percent of cases; the disease affects first-degree relatives (parents, siblings, or offspring) in approximately half of the familial cases. Autosomal PD affecting three or more generations is rare.  Examination -- A detailed neurologic examination is important to identify specific features of the tremor (including its frequency, amplitude, pattern, and body distribution), activating conditions, and other neurologic findings, if present [1,16].  Examination begins with observations of the tremor during the interview. Due to stress, many patients with tremor are more symptomatic during the early part of the  "examination than after they become acclimated to the doctor-patient encounter. Patients should be observed sitting and walking. Horizontal or vertical head tremor is usually associated with tremor elsewhere. Isolated head tremor should raise the possibility of cervical dystonia or midline cerebellar syndromes. Localized jaw tremor is more commonly a manifestation of parkinsonism, while localized face or lip tremor may occur in ET. Essential voice tremor is readily audible and may be further enhanced by having the patient hold a prolonged note.  Tremor in the arm is observed with the affected limb fully supported on the patient's lap or the arm of a chair, with the limb elevated against gravity, and during goal-directed movements. Most resting tremors cease with changes in limb posture but may reappear following repositioning to another fixed posture, especially when held outstretched against gravity, referred to as "re-emergent tremor." Parkinsonian tremor, in contrast with ET, is usually activated by repetitive movements of the opposite hand, during walking, and during mental distraction such as reciting the months of the year backwards.  Postural and kinetic tremors are best elicited with the arms held outstretched; with the shoulders abducted, elbows flexed, and index fingers held an inch apart in front of the face (to elicit any "wing-beating" tremor); during finger-to-nose maneuvers; and while drinking from or pouring from a paper cup. Writing and drawing may demonstrate the large, tremulous, angulated loops of ET or the micrographia of parkinsonism. A drawing sample of a spiral is commonly used to evaluate ET severity and can be repeated to be compared over time. Having the patient draw a spiral with the hand suspended above the table increases the sensitivity of this test.  While examining for postural tremor and kinetic tremor, careful observation for abnormal posturing of the tremulous fingers, hand, or " upper limb is required to rule out a dystonic tremor. Dystonic posturing can also be elicited during examination of repetitive movements of the opposite limb, writing, and walking.  Tremor of the leg should be assessed with the limb at rest, during heel-to-shin testing, and while standing and walking. Leg tremor is more commonly due to parkinsonism than ET.  Frequency of tremor should be estimated at rest and during postural and action maneuvers. Frequency varies depending on cause:  ?High for enhanced physiologic tremor (10 to 12 Hertz [Hz]) and orthostatic tremor (14 to 20 Hz)  ?Medium to high for ET (6 to 12 Hz)  ?Low to medium for the resting tremor of parkinsonism (4 to 6 Hz)  ?Low for rubral (Sanchez) tremor (3 to 5 Hz)  Functional tremors tend to vary in frequency as well as amplitude and direction from moment to moment and either become more irregular or subside entirely when the patient is distracted by performing a complex, repetitive motor task with the contralateral limb. The tremor may also entrain to the frequency of the repetitive movement being tested in another limb. For this reason, it can be helpful to test for entrainability by asking patients to tap their fingers at the same rhythm as the examiner, and then slowly increase or decrease the frequency of the taps, looking for associated changes in the rhythm of the tremor. (See 'Functional tremors' above.)  The gait is almost always normal in patients with ET, while it is characteristically narrow-based and shuffling in PD and wide-based and ataxic in cerebellar disorders. The gait may have histrionic (nonphysiologic) qualities in patients with functional tremors.    Laboratory studies -- The routine laboratory evaluation of tremor should include tests of thyroid function, diagnostic studies to exclude Luis Alfredo disease, and, rarely, screening for heavy metal poisoning such as mercury or arsenic if an environmental cause is suspected. Luis Alfredo disease  "should be suspected in anyone under age 40 who has tremor or other involuntary movements or postures [2] (see "Luis Alfredo disease: Diagnostic tests"). Hypoglycemia and pheochromocytoma may need to be ruled out in patients with enhanced physiologic tremor.  Brain imaging is usually not indicated in patients with classic presentations of ET, PD, enhanced physiologic tremor, or drug-induced tremor, although normal pressure hydrocephalus (NPH) or stroke can rarely produce a parkinsonian phenotype. In cases where there is clinical uncertainty as to whether a patient has ET or isolated PD tremor, striatal dopamine transporter imaging (DaTscan) will detect striatal dopamine deficiency even very early in PD but will be normal in ET. (See "Diagnosis and differential diagnosis of Parkinson disease".)  Magnetic resonance imaging (MRI) can be useful in patients thought to have rubral or Sanchez tremor, which is typically due to a lesion in the cerebellar outflow network; in those with Luis Alfredo disease; and in those with a pure hemidystonic tremor, which may be due to a contralateral structural lesion.  Quantitative computerized analysis of tremor is available in some tertiary care facilities, but its ability to reliably distinguish between tremor types has not been established.    TREATMENT -- The treatment of tremor is symptomatic, and some forms lack any useful pharmacologic therapy. Even medications known to have some benefit for certain tremor syndromes usually have only partial effect. For combined tremor syndromes, treatment of the underlying disorder is the primary approach.  Physical and occupational therapy may be of benefit to help identify coping mechanisms and compensatory strategies. In patients with refractory or disabling tremor, the approach should be individualized according to the underlying cause. In addition to medications, some patients may be candidates for surgical therapies.  ?Rest tremor - Rest tremor " "associated with Parkinson disease (PD) or other parkinsonian disorders is managed by treatment of the underlying disorder, typically with antiparkinson agents such as anticholinergic drugs, amantadine, dopamine agonists, levodopa, and zonisamide. Deep brain stimulation (DBS) is an option for some patients with refractory tremor due to PD, but not due to other parkinsonian disorders. This topic is discussed separately. (See "Initial pharmacologic treatment of Parkinson disease" and "Device-assisted and lesioning procedures for Parkinson disease".)  ?Enhanced physiologic tremor - Physiologic tremor is best managed by reduction or removal of the responsible offending medication or toxin (table 4); diagnosis and treatment of possible associated endocrine disorders; and dealing with stress, anxiety, or fatigue (see 'Physiologic tremor' above). Single doses of propranolol, a short-acting beta blocker, taken in anticipation of social situations that are likely to exacerbate tremor (eg, as with tremor associated with public speaking or having dinner with friends) are useful in some patients.  ?Essential tremor (ET) - ET can be treated pharmacologically with partial symptomatic benefit. Propranolol and primidone are the preferred, evidence-based drugs to use as initial monotherapy, and when used in combination they can sometimes offer additive benefit (algorithm 1). Second-line medications for ET include agents such as topiramate and gabapentin. Botulinum toxin injections can be effective for head tremor and vocal tremor in ET, and in selected cases for upper extremity tremor. Both DBS and unilateral thalamotomy, which can now be performed via focused ultrasound with MRI guidance, are effective for the treatment of medically refractory ET. (See "Essential tremor: Treatment and prognosis" and "Surgical treatment of essential tremor".)  ?Orthostatic tremor - Orthostatic tremor involves the legs and trunk and occurs exclusively " "while standing (see 'Orthostatic tremor' above). In one retrospective case series of 184 patients with orthostatic tremor, treatment with benzodiazepines, mainly clonazepam, was associated with moderate or marked relief in approximately one-third of the patients [11]. Other classes, such as antiseizure medications (eg, gabapentin, primidone, valproate) were associated with lower rates of improvement, and lack of a placebo control was a major limitation. Limited evidence suggests that some patients with medically refractory orthostatic tremor show modest improvement with DBS of the ventral intermediate nucleus of the thalamus [17].  ?Cerebellar tremor - Cerebellar tremors lack any useful pharmacotherapy. The rare patient with severe intention tremor and little or no ataxia, such as sometimes occurs in multiple sclerosis, may be helped by DBS of the ventral intermediate nucleus of the thalamus. (See "Surgical treatment of essential tremor".)  ?Rubral tremor - Rubral tremor sometimes responds to levodopa therapy [13,18]. Other medications that may have some benefit are levetiracetam and trihexyphenidyl; thalamic DBS has been used with some success for medically refractory patients but requires more study [19].  ?Dystonic tremor - Dystonic tremor of the head related to cervical dystonia can be improved with botulinum toxin injections into bilateral cervical muscles. If sternocleidomastoid muscles are injected bilaterally, small doses should be used to avoid dysphagia. (See "Treatment of dystonia in children and adults", section on 'Focal dystonia'.)    SOCIETY GUIDELINE LINKS -- Links to society and government-sponsored guidelines from selected countries and regions around the world are provided separately. (See "Society guideline links: Essential tremor".)  INFORMATION FOR PATIENTS -- [facility] offers two types of patient education materials, "The Basics" and "Beyond the Basics." The Basics patient education pieces are " "written in plain language, at the 5th to 6th grade reading level, and they answer the four or five key questions a patient might have about a given condition. These articles are best for patients who want a general overview and who prefer short, easy-to-read materials. Beyond the Basics patient education pieces are longer, more sophisticated, and more detailed. These articles are written at the 10th to 12th grade reading level and are best for patients who want in-depth information and are comfortable with some medical jargon.  Here are the patient education articles that are relevant to this topic. We encourage you to print or e-mail these topics to your patients. (You can also locate patient education articles on a variety of subjects by searching on "patient info" and the keyword(s) of interest.)  ?Basics topics (see "Patient education: Tremor (The Basics)")  ?Beyond the Basics topics (see "Patient education: Tremor (Beyond the Basics)")    SUMMARY AND RECOMMENDATIONS  ?Classification - Clinical classification of tremor is based on history, tremor characteristics, associated neurologic and systemic signs, and, in some cases, additional testing. A vast number of diseases, disorders, medications, toxins, and substances cause tremor. Etiologies can be broadly classified as genetic, acquired, and idiopathic. (See 'Classification' above.)  ?Rest versus action - The activating conditions that give rise to a tremor are a key distinguishing feature (table 1). Rest tremor occurs in a body part that is fully supported and not voluntarily activated, whereas action tremor occurs with voluntary muscle contraction. Action tremors can be further characterized as kinetic, postural, and isometric. (See 'Rest versus action' above.)  ?Causes of rest tremor - Parkinson disease (PD) and other parkinsonian syndromes are the most common causes of rest tremor (table 2). (See 'Rest tremors' above.)  ?Causes of action tremor - Examples of " isolated tremor syndromes in which action tremor can be the sole neurologic symptom include (table 2):  Enhanced physiologic tremor (table 4) (see 'Physiologic tremor' above)  Essential tremor (ET) (table 5) (see 'Essential tremor' above)  Task-specific tremors (eg, writing tremor) (see 'Primary writing tremor' above)  Orthostatic tremor (see 'Orthostatic tremor' above)  Examples of combined tremor syndromes in which action tremor may be seen in association with other neurologic or systemic signs and symptoms include:  Cerebellar tremor (see 'Cerebellar tremor' above)  Neuropathic tremor (see 'Neuropathic tremor' above)  Dystonic tremor (see 'Dystonic tremor' above)  Functional tremor (see 'Functional tremors' above)  ?Evaluation - A detailed neurologic examination is important to identify specific features of the tremor (including its frequency, amplitude, pattern, and body distribution), activating conditions, and other neurologic findings, if present. (See 'Evaluation' above.)  The routine laboratory evaluation of tremor should include tests of thyroid function, diagnostic studies to exclude Luis Alfredo disease, and, rarely, screening for heavy metal poisoning such as mercury or arsenic if an environmental cause is suspected. Brain imaging is usually not indicated in patients with classic presentations of ET, PD, enhanced physiologic tremor, or drug-induced tremor. (See 'Laboratory studies' above.)  ?Treatment - Physical and occupational therapy may be of benefit in patients with limb tremor to help identify coping mechanisms and compensatory strategies. Some forms of tremor are responsive to symptomatic pharmacotherapy. (See 'Treatment' above.)
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
## 2025-04-27 DIAGNOSIS — G25.81 RESTLESS LEGS SYNDROME: ICD-10-CM

## 2025-04-27 DIAGNOSIS — G25.0 ESSENTIAL TREMOR: ICD-10-CM

## 2025-04-30 RX ORDER — GABAPENTIN 300 MG/1
300 CAPSULE ORAL NIGHTLY
Qty: 30 CAPSULE | Refills: 0 | Status: SHIPPED | OUTPATIENT
Start: 2025-04-30

## 2025-05-26 NOTE — PROGRESS NOTES
Subjective     Patient ID: Allen Espazra is a 54 y.o. male.    Chief Complaint: Tremors    HPI  ET & RLS  Inderal LA 80/day (increased to this in Aug 2024)  Still having RUE tremor  Has difficulty drinking, eating, pouring liquids from bottle/carton, tying shoes, writing, holding/reading papers, difficulty holding/using phone, trouble carrying heavy bags    Jennifer was not approved by his insurance  He wants to cont his same meds for now    Prev tried &/or failed: propranolol, inderal LA, gabapentin, ropinirole, horizant, flexeril, tizanidine    RLS not bothersome  Exercising regularly      Review of Systems  A 14pt ROS was reviewed & is negative unless otherwise documented in the HPI       Objective     Physical Exam  GENERAL: NAD, calm, cooperative, appropriate  Awake/alert  Well groomed  RESP: CTAB  HEART: RRR  No LE edema  MENTAL STATUS: oriented, follow commands reliably  SPEECH/LANGUAGE: clear, fluent  CN:  Perrla, eomi, vff, gaze conjugate  No tactile or motor facial asymmetry  Tongue protrudes midline  Motor: no focal weakness  Cerebellar: RUE tremor, wing beating worse  Sensory: normal to tactile stim/vibration  DTRs: normal +2, symmetric  Gait: steady       Assessment and Plan     1. Essential tremor      PLAN:  Cont inderal 80mg day  F/u 6m    [facility]     Follow up in about 6 months (around 11/27/2025) for ET.

## 2025-05-27 ENCOUNTER — OFFICE VISIT (OUTPATIENT)
Dept: NEUROLOGY | Facility: CLINIC | Age: 55
End: 2025-05-27
Payer: COMMERCIAL

## 2025-05-27 VITALS
BODY MASS INDEX: 27.63 KG/M2 | DIASTOLIC BLOOD PRESSURE: 77 MMHG | HEIGHT: 70 IN | SYSTOLIC BLOOD PRESSURE: 117 MMHG | WEIGHT: 193 LBS

## 2025-05-27 DIAGNOSIS — G25.0 ESSENTIAL TREMOR: Primary | ICD-10-CM

## 2025-05-27 PROCEDURE — 99214 OFFICE O/P EST MOD 30 MIN: CPT | Mod: S$GLB,,, | Performed by: NURSE PRACTITIONER

## 2025-05-27 PROCEDURE — 3074F SYST BP LT 130 MM HG: CPT | Mod: CPTII,S$GLB,, | Performed by: NURSE PRACTITIONER

## 2025-05-27 PROCEDURE — 1160F RVW MEDS BY RX/DR IN RCRD: CPT | Mod: CPTII,S$GLB,, | Performed by: NURSE PRACTITIONER

## 2025-05-27 PROCEDURE — 1159F MED LIST DOCD IN RCRD: CPT | Mod: CPTII,S$GLB,, | Performed by: NURSE PRACTITIONER

## 2025-05-27 PROCEDURE — 3078F DIAST BP <80 MM HG: CPT | Mod: CPTII,S$GLB,, | Performed by: NURSE PRACTITIONER

## 2025-05-27 PROCEDURE — 3008F BODY MASS INDEX DOCD: CPT | Mod: CPTII,S$GLB,, | Performed by: NURSE PRACTITIONER

## 2025-05-27 PROCEDURE — 99999 PR PBB SHADOW E&M-EST. PATIENT-LVL III: CPT | Mod: PBBFAC,,, | Performed by: NURSE PRACTITIONER

## 2025-05-27 NOTE — PATIENT INSTRUCTIONS
"INTRODUCTION   Tremor is defined as an involuntary, rhythmic, and oscillatory movement of a body part [1]. It is caused by either alternating or synchronous contractions of antagonistic muscles. Tremor is the most common of all movement disorders, occurring from time to time in many normal individuals in the form of exaggerated physiologic tremor [2].  This topic will provide an overview of the classification, clinical features, diagnostic evaluation, and treatment of tremor, including essential tremor (ET), which also is discussed in greater detail separately. (See "Essential tremor: Clinical features and diagnosis" and "Essential tremor: Treatment and prognosis" and "Surgical treatment of essential tremor".)    CLASSIFICATION -- Tremors have been variably defined and categorized over time, and classification is made difficult by overlapping clinical characteristics and etiologies. The most common distinction is based on the activating conditions (ie, at rest versus action), but other clinical characteristics, such as frequency and body distribution, also guide the evaluation and etiologic diagnosis.  A clinical and etiologic classification scheme has been proposed by the International Parkinson and Movement Disorder Society [1]. It provides a framework upon which currently recognized and new tremor syndromes can be defined.  Clinical characteristics -- Clinical classification of tremor is based on history, tremor characteristics, associated neurologic and systemic signs, and, in some cases, additional testing.  Rest versus action -- The activating conditions that give rise to a tremor are a key distinguishing feature (table 1).  Rest tremor occurs in a body part that is fully supported, relaxed, and not voluntarily activated.  Action tremor occurs with voluntary muscle contraction and is further subdivided into three types:  ?Kinetic tremor occurs during any voluntary movement. Kinetic tremors include:  Simple " kinetic tremor, in which tremor is roughly the same throughout the course of a voluntary movement  Intention tremor, characterized by a crescendo increase in tremor as the affected body part approaches its target  Task-specific kinetic tremor, which occurs during a specific task such as writing  ?Postural tremor occurs when a specific posture or position, such as holding the arms outstretched or while standing, is voluntarily maintained.  ?Isometric tremor occurs during muscle contraction against a stationary object, such as making a fist or squeezing an object.  Tremor frequency and body distribution -- Tremor frequency (oscillations per second) is used to describe tremor, although it is not particularly helpful in diagnosis as most pathologic tremors have a frequency of 4 to 8 Hertz (Hz) [1]. The most common subdivisions used for descriptive purposes are <4, 4 to 8, 8 to 12, and >12 Hz. Tremor frequency is typically estimated clinically but can be accurately measured using a motion transducer or electromyography (EMG).  The anatomic distribution of tremor can be classified as:  ?Focal (only one body region is affected, such as voice, head, jaw, or one limb)  ?Segmental (two or more contiguous body parts in the upper or lower body are affected, such as head and arm)  ?Hemitremor (one side of the body is affected)  ?Generalized (upper and lower body are affected)  Isolated versus combined -- Tremor syndromes are specific combinations of clinical signs and symptoms that commonly coexist. Syndromes can help narrow the search for an etiologic diagnosis. Syndromes may be isolated, when tremor is the only neurologic manifestation, or combined, when other systemic or neurologic signs coexist with the tremor (table 2) [1]. Importantly, syndromes may have multiple etiologies, and a particular etiology may produce multiple clinical syndromes.  Etiology -- A vast number of diseases, disorders, medications, toxins, and  "substances cause tremor. Etiologies can be broadly classified as genetic, acquired, and idiopathic.  Some of the more common etiologies of resting and action tremors are listed in the table (table 3). In addition to these, there are many uncommon or rare conditions that may be associated with tremor; most are action tremors [3].    REST TREMORS -- Parkinson disease (PD) and other parkinsonian syndromes are the most common causes of rest tremor.  General features -- Rest tremor is evident with the affected body part fully supported against gravity and completely at rest. It is temporarily dampened or abolished during voluntary activity. Rest tremors usually fluctuate in amplitude and may appear and disappear depending upon the degree of patient repose, whether the patient feels that they are under observation, and other factors, including stress, excitement, or recent exercise.  Rest tremor is typically less disabling than postural or kinetic tremors because of its absence during voluntary activity. However, a rest tremor may quickly reappear as soon as the body part assumes a new resting or antigravity posture (a phenomenon referred to as "re-emergent tremor"), and in this case will interfere with the use of eating utensils, handwriting, typing, and other purposeful postures or movements. Rest tremor with these postural features is more of a handicap or disability than pure rest tremor, which is of mainly cosmetic concern.  Dystonic tremor is usually postural or task specific but it can occur at rest, in which state it tends to be jerky and irregular, unlike the smoother, more rhythmic pattern of the tremor of parkinsonism. (See 'Dystonic tremor' below.)  On examination, when rest tremor is absent or minimally apparent, it usually can be activated or enhanced by having the patient walk, perform repetitive movements of the opposite limb, or perform a cognitive task, such as naming the months of the year " "backwards.  Idiopathic Parkinson disease -- The rest tremor in PD typically appears first in one hand or less commonly in one leg. As the disease progresses, tremor may spread from one hand to involve the ipsilateral leg and/or the contralateral arm. Tremor of the leg or foot is more commonly due to PD than to essential tremor (ET). The face, lips, and jaw may be involved but, unlike ET or cerebellar disease, PD only occasionally produces tremor of the entire head. (See "Clinical manifestations of Parkinson disease", section on 'Tremor'.)  When the tremor in PD is limited to distal muscles of the hand, it may produce a characteristic "pill-rolling" pattern, with a frequency of 4 to 6 Hertz (Hz). With increasing severity, the tremor may become more continuous, larger in amplitude, and more proximal in distribution, but the frequency remains constant.  Postural tremor may coexist with rest tremor or may be present by itself in some patients with PD, sometimes leading to an incorrect diagnosis of ET. (See 'Essential tremor' below.)  Tremor-dominant Parkinson disease -- A low-amplitude rest tremor of the hand or jaw, unaccompanied by other manifestations of parkinsonism, sometimes occurs as an isolated finding and may not progress to more generalized PD. However, tremor is such a common first sign of PD that it most often is followed by progressive and more disabling symptoms such as generalized bradykinesia, gait disturbance, and postural instability.  Patients can go many years into their disease with tremor as the only or the most prominent sign of their disorder. When this happens, this is referred to as "tremor-dominant PD," although this designation is most reliably applied in retrospect, when the relatively benign course has already been charted.  Isolated rest tremor (SWEDD) -- The radiographic term "SWEDD" (Scans Without Evidence of Dopaminergic Deficit) has been used to designate patients with relatively " "isolated rest and action tremors of the arms, resembling those of early PD, that have failed to evolve over time into more generalized PD [4]. Unlike patients with PD, these individuals lack evidence for nigrostriatal dopamine deficiency with dopamine transporter imaging (DaTscan). Patients with SWEDD sometimes exhibit reduced arm swing and mild focal dystonia on the affected side, and may have jaw or head tremor or facial hypomimia, but no signs of parkinsonian bradykinesia [4]. They are therefore to be distinguished from individuals with tremor-dominant PD. (See "Diagnosis and differential diagnosis of Parkinson disease", section on 'Scans without evidence of dopaminergic deficit (SWEDD)'.)    Others -- Other disorders associated with rest tremor include drug-induced parkinsonism secondary to dopamine receptor blocking agents; the atypical parkinsonian syndromes such as dementia with Lewy bodies, multiple system atrophy, and progressive supranuclear palsy; Luis Alfredo disease; non-Wilsonian hepatocerebral degeneration; and thalamic or midbrain injury due to stroke, trauma, or demyelinating disease. Rest tremor may also occur as a "spillover" phenomenon in a variety of disorders in which very severe action tremors predominate, such as Luis Alfredo disease, multiple sclerosis, severe forms of ET [5], and other cerebellar or extrapyramidal syndromes.  Rubral tremor and dystonic tremor are combined tremor syndromes in which the tremor can have a rest component but is often mixed with postural and kinetic features as well. (See 'Rubral tremor' below and 'Dystonic tremor' below.)    ACTION TREMORS -- Action tremors are the largest group of tremors. They include postural tremors, elicited during examination with the arms suspended against gravity in a fixed posture, and kinetic tremors, which emerge during the course of goal-directed activity (eg, drinking from a glass of water, pouring water from one cup into another, writing, and " "drawing of a spiral).  Physiologic tremor -- Normal individuals have a very low-amplitude, high-frequency physiologic tremor of approximately 10 to 12 Hertz (Hz). Physiologic tremor is usually not visible under ordinary circumstances, although some people have a natural proclivity to demonstrate mild, nondisabling physiologic tremulousness. Many factors can enhance the tremor to the point of detection, most often by increasing sympathetic activity [2].  ?Medications - Common drugs that increase adrenergic activity include beta-adrenergic agonists such as terbutaline, albuterol, isoproterenol, epinephrine, amphetamines, selective serotonin reuptake inhibitors (SSRIs) and tricyclic antidepressants, levodopa, nicotine, and xanthines such as theophylline and caffeine (table 4).  ?High adrenergic state - Anxiety, excitement, fright, muscle fatigue, hypoglycemia, alcohol and opioid withdrawal, thyrotoxicosis, fever, and pheochromocytoma also enhance adrenergic activity. (See "Neurologic manifestations of hyperthyroidism and Graves' disease", section on 'Tremor'.)  Enhancement of physiologic tremor is the most common cause of action tremor. Thus, a medical rather than primary neurologic cause for action tremor should be considered first in most cases.  Drug-induced tremor -- Drugs and toxins that commonly increase physiologic tremor or induce tremor by other means include lithium, antidepressants, corticosteroids, sodium valproate, tacrolimus, bromides, mercury, lead, and arsenic (table 4).  Essential tremor -- Essential tremor (ET) is reviewed here briefly and is discussed in detail separately. (See "Essential tremor: Clinical features and diagnosis" and "Essential tremor: Treatment and prognosis" and "Surgical treatment of essential tremor".)  ET is the most common neurologic disorder that causes action tremor, with an estimated prevalence worldwide of up to 5 percent of the population. The incidence of ET increases with " "age, although it often affects middle-aged individuals, especially when it is familial. A family history is present in 30 to 70 percent of cases, and evidence suggests an autosomal dominant pattern of inheritance with reduced penetrance. (See "Essential tremor: Clinical features and diagnosis", section on 'Pathogenesis and genetics'.)  ET varies from a low-amplitude, high-frequency postural tremor of the hands to a much larger-amplitude, and at times lower-frequency, postural and action tremor that is activated by particular postures and actions. ET most often affects the hands and arms and can be asymmetric. Less often, it may involve the head, voice, trunk, and legs. ET becomes immediately apparent in the arms when they are held outstretched; it typically increases at the very end of goal-directed movements such as drinking from a glass or finger-to-nose testing. Tremor in the legs is unusual in ET. Tremor of the head may be vertical ("yes-yes") or horizontal ("no-no") and is usually associated with tremor of the hand or voice. ET is typically relieved by small amounts of alcohol. Caffeine can worsen the tremor, although this effect is much more commonly seen with physiologic tremor.  The diagnosis of ET is based upon clinical features (table 5). (See "Essential tremor: Clinical features and diagnosis", section on 'Evaluation'.)  The main considerations in the differential diagnosis of ET are enhanced physiologic tremor, drug-induced tremor, parkinsonian tremor, tremor caused by cerebellar disorders, and dystonic tremor.  A frequent entity on the differential diagnosis of ET is parkinsonian tremor (table 6 and table 7 and table 8). Differentiation from classic resting tremor should be straightforward; however, some patients with Parkinson disease (PD) also have an action tremor indistinguishable from ET. In fact, it is not unusual for patients with PD to present with a postural tremor before they display other " "signs of PD. Likewise, patients with severe ET may have a rest component to their tremor, and some patients with ET exhibit mild nonprogressive parkinsonism.    Primary writing tremor -- Many action tremors are particularly severe during the act of writing. Tremor that occurs exclusively while writing, and not during other voluntary motor activities, is referred to as "primary writing tremor" [6]. This tremor is limited to the hand and causes relatively large-amplitude supination-pronation movements at a frequency of 5 to 6 Hz. The low frequency and large amplitude of the tremor, its frequent occurrence in writer's "cramp" or writer's dystonia, its relative resistance to propranolol, and its occasional response to anticholinergic drugs suggest a closer relationship to dystonia than to ET. (See "Etiology, clinical features, and diagnostic evaluation of dystonia", section on 'Task-specific dystonia'.)    Orthostatic tremor -- Orthostatic tremor is a postural tremor limited to the legs and trunk that occurs exclusively while standing [7-10]. In two reports of 184 and 68 patients, there was a female predominance (64 and 77 percent, respectively) [11,12]. The mean age of onset was between 54 and 59 years (range 13 to 85 years). Both high- and low-frequency orthostatic tremors have been described; their relationship to ET is uncertain. In cases of high-frequency tremor, movements of the legs may be so low in amplitude that they initially escape clinical detection.    Cerebellar tremor -- "Cerebellar tremor" is the term used to describe a tremor that is caused by known cerebellar pathology. It can be postural, simple kinetic, intention, or any combination of these features. In severe cases, cerebellar tremor, like ET, can spill over and also occur at rest. Tremor frequency is typically low (3 to 4 Hz) and can be associated with ataxia and dysmetria.  Cerebellar tremor is due to disturbances anywhere along the path of the " "cerebellar outflow projection system from the dentate nucleus of the cerebellum to its termination in the ventral lateral nucleus of the thalamus. The most common causes are multiple sclerosis, midbrain trauma, and stroke (table 3). Degenerative and inflammatory diseases of the cerebellum, severe forms of ET, Luis Alfredo disease, hepatocerebral degeneration, and mercury poisoning may also produce this tremor.  Cerebellar tremor typically has a component of intention tremor in that the tremor increases in severity, either gradually or abruptly, as the hand moves closer to its target. Intention tremors are usually very large in amplitude due to involvement of proximal muscles and are sometimes difficult to distinguish from severe cerebellar ataxia. The frequent association with ataxia, dysmetria, titubation, and other cerebellar signs serves to identify the cerebellar origin of intention tremor.  Titubation of the head and neck ("to and fro" movements) may be associated with cerebellar tremor; it is distinguished from essential head tremor by the presence of other cerebellar findings.    Rubral tremor -- Rubral tremor, sometimes called Sanchez tremor after the early 20th century English neurologist who described it, is a subtype of cerebellar tremor with a greater degree of proximal, high-amplitude movement. Rubral tremor is caused by midbrain, thalamic, cerebellar, or pontine injury [13]. In many cases of rubral tremor, there are combined lesions that interrupt the outflow pathway from the cerebellum to the motor thalamus, often involving the superior cerebellar peduncle, substantia nigra, or red nucleus.  Rubral tremor is present at rest but tends to have a slower frequency (3 to 5 Hz) than typical Parkinson rest tremor (5 to 7 Hz). It also persists unchanged or increases with postural changes or goal-directed activity. It may produce a combination of rest, postural, simple kinetic, and intention tremors.  Neuropathic " "tremor -- Postural or kinetic tremors are sometimes associated with large fiber peripheral neuropathy. This association is most commonly observed in hereditary neuropathies, during the recovery phase of some cases of Guillain-Barré syndrome, and in chronic inflammatory demyelinating polyneuropathy (CIDP). Muscle weakness and loss of proprioceptive or muscle spindle inputs may account for the tremor. The frequency and amplitude of neuropathic tremor may vary greatly when associated with a proprioceptive deficit. (See "Overview of polyneuropathy".)    Dystonic tremor -- Dystonic tremor refers to tremor in a body part that is affected by dystonia. Because of the association with dystonia, dystonic tremor is an example of a combined tremor syndrome [1]. Common examples include dystonic head tremor and segmental tremulous dystonia of the head and upper limbs, which occur due to cervical dystonia. (See "Etiology, clinical features, and diagnostic evaluation of dystonia".)  Dystonic tremors are usually postural or task specific but sometimes occur at rest, in which state they tend to be jerky and irregular. The use of an alleviating maneuver (geste antagoniste, sensory trick) can be helpful in distinguishing dystonic tremor from other tremor syndromes, such as ET. In dystonic head tremor, for example, moving an arm to the face or head in a specific plane can alleviate cervical dystonia symptoms, including head tremor. Exacerbation of dystonic tremor by an attempt to maintain certain postures can also be helpful in the examination.    FUNCTIONAL TREMORS -- Functional tremor, previously known as psychogenic tremor, is typically characterized by complex rest, postural, and kinetic tremor with abrupt onset, a static course, changeable features, functional disability out of proportion to tremor magnitude, and resistance to treatment. Any body part may be involved, but, remarkably, the fingers are often spared, with much of the " "tremor of the arm occurring at the wrist.  Other features of functional movement disorders are often present, such as distractibility, variability of symptoms, and clinical features that are incongruous with known tremor syndromes. Examination usually shows variable tremor direction and frequency, distractibility (attenuation or elimination of tremor with distraction maneuvers such as repetitive tapping tasks with an uninvolved opposite hand or foot), and/or tremor entrainment (ie, shift of tremor frequency to the speed of a distraction maneuver). (See "Functional movement disorders", section on 'Clinical features'.)    EVALUATION -- The diagnostic approach to patients with tremor involves the history, physical examination, and selected laboratory studies [14]. Action tremor is most common and, of these, essential tremor (ET) and enhanced physiologic tremor are the most frequent causes [15]. Patients with tremor due to other disorders such as hyperthyroidism, Parkinson disease (PD), dystonia, Luis Alfredo disease, or medications frequently have additional signs or symptoms that help point to the diagnosis, although this is not always the case. The criteria for ET (table 5) and methods for distinguishing it from other tremors are shown in the tables (table 1 and table 3 and table 6 and table 7 and table 8).    History -- The history concerning the age of onset and evolution of tremor is usually straightforward, since it is a highly visible symptom that is readily evident to the patient and family members. Examination of previous handwriting samples may be useful in determining the precise time of onset.  Body parts that are affected and symmetry can be helpful clues to the likely diagnosis. Head tremor is common in ET but unusual in PD; conversely, jaw tremor and resting leg tremor are common in PD and rare in ET. Jaw or chin tremor at rest should be distinguished from lower lip tremor that occurs with activation, as " facial tremor with activation may be seen in ET. Vocal tremor is strongly suggestive of ET. Asymmetry or unilaterality is typically more characteristic of PD tremor than ET. Any involuntary posturing that occurs with tremor should raise the possibility of a dystonic tremor.  Precipitating, aggravating, or relieving factors such as caffeine, alcohol, medications, exercise, fatigue, or stress should be elicited; a complete list of all medications should be reviewed to exclude the possibility of enhanced physiologic tremor and drug-induced tremor (see 'Physiologic tremor' above). Mitigation of tremor by alcohol is classically described in ET but is not highly sensitive or specific.  Patients should also be questioned about any associated neurologic symptoms that would assist in diagnosis, including loss of dexterity, stiffness, stooped posture, voice softness, shuffling gait, acting out of dreams (all suggestive of PD), cognitive changes, and visual hallucinations (suggestive of dementia with Lewy bodies). Lack of any of the above features along with a predominantly action tremor would be suggestive of ET.  Family history in ET reflects an autosomal dominant pattern of inheritance in approximately half of all patients. Parkinsonian tremor is usually sporadic, but a family history of PD is present in approximately 15 percent of cases; the disease affects first-degree relatives (parents, siblings, or offspring) in approximately half of the familial cases. Autosomal PD affecting three or more generations is rare.  Examination -- A detailed neurologic examination is important to identify specific features of the tremor (including its frequency, amplitude, pattern, and body distribution), activating conditions, and other neurologic findings, if present [1,16].  Examination begins with observations of the tremor during the interview. Due to stress, many patients with tremor are more symptomatic during the early part of the  "examination than after they become acclimated to the doctor-patient encounter. Patients should be observed sitting and walking. Horizontal or vertical head tremor is usually associated with tremor elsewhere. Isolated head tremor should raise the possibility of cervical dystonia or midline cerebellar syndromes. Localized jaw tremor is more commonly a manifestation of parkinsonism, while localized face or lip tremor may occur in ET. Essential voice tremor is readily audible and may be further enhanced by having the patient hold a prolonged note.  Tremor in the arm is observed with the affected limb fully supported on the patient's lap or the arm of a chair, with the limb elevated against gravity, and during goal-directed movements. Most resting tremors cease with changes in limb posture but may reappear following repositioning to another fixed posture, especially when held outstretched against gravity, referred to as "re-emergent tremor." Parkinsonian tremor, in contrast with ET, is usually activated by repetitive movements of the opposite hand, during walking, and during mental distraction such as reciting the months of the year backwards.  Postural and kinetic tremors are best elicited with the arms held outstretched; with the shoulders abducted, elbows flexed, and index fingers held an inch apart in front of the face (to elicit any "wing-beating" tremor); during finger-to-nose maneuvers; and while drinking from or pouring from a paper cup. Writing and drawing may demonstrate the large, tremulous, angulated loops of ET or the micrographia of parkinsonism. A drawing sample of a spiral is commonly used to evaluate ET severity and can be repeated to be compared over time. Having the patient draw a spiral with the hand suspended above the table increases the sensitivity of this test.  While examining for postural tremor and kinetic tremor, careful observation for abnormal posturing of the tremulous fingers, hand, or " upper limb is required to rule out a dystonic tremor. Dystonic posturing can also be elicited during examination of repetitive movements of the opposite limb, writing, and walking.  Tremor of the leg should be assessed with the limb at rest, during heel-to-shin testing, and while standing and walking. Leg tremor is more commonly due to parkinsonism than ET.  Frequency of tremor should be estimated at rest and during postural and action maneuvers. Frequency varies depending on cause:  ?High for enhanced physiologic tremor (10 to 12 Hertz [Hz]) and orthostatic tremor (14 to 20 Hz)  ?Medium to high for ET (6 to 12 Hz)  ?Low to medium for the resting tremor of parkinsonism (4 to 6 Hz)  ?Low for rubral (Sanchez) tremor (3 to 5 Hz)  Functional tremors tend to vary in frequency as well as amplitude and direction from moment to moment and either become more irregular or subside entirely when the patient is distracted by performing a complex, repetitive motor task with the contralateral limb. The tremor may also entrain to the frequency of the repetitive movement being tested in another limb. For this reason, it can be helpful to test for entrainability by asking patients to tap their fingers at the same rhythm as the examiner, and then slowly increase or decrease the frequency of the taps, looking for associated changes in the rhythm of the tremor. (See 'Functional tremors' above.)  The gait is almost always normal in patients with ET, while it is characteristically narrow-based and shuffling in PD and wide-based and ataxic in cerebellar disorders. The gait may have histrionic (nonphysiologic) qualities in patients with functional tremors.    Laboratory studies -- The routine laboratory evaluation of tremor should include tests of thyroid function, diagnostic studies to exclude Luis Alfredo disease, and, rarely, screening for heavy metal poisoning such as mercury or arsenic if an environmental cause is suspected. Luis Alfredo disease  "should be suspected in anyone under age 40 who has tremor or other involuntary movements or postures [2] (see "Luis Alfredo disease: Diagnostic tests"). Hypoglycemia and pheochromocytoma may need to be ruled out in patients with enhanced physiologic tremor.  Brain imaging is usually not indicated in patients with classic presentations of ET, PD, enhanced physiologic tremor, or drug-induced tremor, although normal pressure hydrocephalus (NPH) or stroke can rarely produce a parkinsonian phenotype. In cases where there is clinical uncertainty as to whether a patient has ET or isolated PD tremor, striatal dopamine transporter imaging (DaTscan) will detect striatal dopamine deficiency even very early in PD but will be normal in ET. (See "Diagnosis and differential diagnosis of Parkinson disease".)  Magnetic resonance imaging (MRI) can be useful in patients thought to have rubral or Sanchez tremor, which is typically due to a lesion in the cerebellar outflow network; in those with Luis Alfredo disease; and in those with a pure hemidystonic tremor, which may be due to a contralateral structural lesion.  Quantitative computerized analysis of tremor is available in some tertiary care facilities, but its ability to reliably distinguish between tremor types has not been established.    TREATMENT -- The treatment of tremor is symptomatic, and some forms lack any useful pharmacologic therapy. Even medications known to have some benefit for certain tremor syndromes usually have only partial effect. For combined tremor syndromes, treatment of the underlying disorder is the primary approach.  Physical and occupational therapy may be of benefit to help identify coping mechanisms and compensatory strategies. In patients with refractory or disabling tremor, the approach should be individualized according to the underlying cause. In addition to medications, some patients may be candidates for surgical therapies.  ?Rest tremor - Rest tremor " "associated with Parkinson disease (PD) or other parkinsonian disorders is managed by treatment of the underlying disorder, typically with antiparkinson agents such as anticholinergic drugs, amantadine, dopamine agonists, levodopa, and zonisamide. Deep brain stimulation (DBS) is an option for some patients with refractory tremor due to PD, but not due to other parkinsonian disorders. This topic is discussed separately. (See "Initial pharmacologic treatment of Parkinson disease" and "Device-assisted and lesioning procedures for Parkinson disease".)  ?Enhanced physiologic tremor - Physiologic tremor is best managed by reduction or removal of the responsible offending medication or toxin (table 4); diagnosis and treatment of possible associated endocrine disorders; and dealing with stress, anxiety, or fatigue (see 'Physiologic tremor' above). Single doses of propranolol, a short-acting beta blocker, taken in anticipation of social situations that are likely to exacerbate tremor (eg, as with tremor associated with public speaking or having dinner with friends) are useful in some patients.  ?Essential tremor (ET) - ET can be treated pharmacologically with partial symptomatic benefit. Propranolol and primidone are the preferred, evidence-based drugs to use as initial monotherapy, and when used in combination they can sometimes offer additive benefit (algorithm 1). Second-line medications for ET include agents such as topiramate and gabapentin. Botulinum toxin injections can be effective for head tremor and vocal tremor in ET, and in selected cases for upper extremity tremor. Both DBS and unilateral thalamotomy, which can now be performed via focused ultrasound with MRI guidance, are effective for the treatment of medically refractory ET. (See "Essential tremor: Treatment and prognosis" and "Surgical treatment of essential tremor".)  ?Orthostatic tremor - Orthostatic tremor involves the legs and trunk and occurs exclusively " "while standing (see 'Orthostatic tremor' above). In one retrospective case series of 184 patients with orthostatic tremor, treatment with benzodiazepines, mainly clonazepam, was associated with moderate or marked relief in approximately one-third of the patients [11]. Other classes, such as antiseizure medications (eg, gabapentin, primidone, valproate) were associated with lower rates of improvement, and lack of a placebo control was a major limitation. Limited evidence suggests that some patients with medically refractory orthostatic tremor show modest improvement with DBS of the ventral intermediate nucleus of the thalamus [17].  ?Cerebellar tremor - Cerebellar tremors lack any useful pharmacotherapy. The rare patient with severe intention tremor and little or no ataxia, such as sometimes occurs in multiple sclerosis, may be helped by DBS of the ventral intermediate nucleus of the thalamus. (See "Surgical treatment of essential tremor".)  ?Rubral tremor - Rubral tremor sometimes responds to levodopa therapy [13,18]. Other medications that may have some benefit are levetiracetam and trihexyphenidyl; thalamic DBS has been used with some success for medically refractory patients but requires more study [19].  ?Dystonic tremor - Dystonic tremor of the head related to cervical dystonia can be improved with botulinum toxin injections into bilateral cervical muscles. If sternocleidomastoid muscles are injected bilaterally, small doses should be used to avoid dysphagia. (See "Treatment of dystonia in children and adults", section on 'Focal dystonia'.)    SOCIETY GUIDELINE LINKS -- Links to society and government-sponsored guidelines from selected countries and regions around the world are provided separately. (See "Society guideline links: Essential tremor".)  INFORMATION FOR PATIENTS -- [facility] offers two types of patient education materials, "The Basics" and "Beyond the Basics." The Basics patient education pieces are " "written in plain language, at the 5th to 6th grade reading level, and they answer the four or five key questions a patient might have about a given condition. These articles are best for patients who want a general overview and who prefer short, easy-to-read materials. Beyond the Basics patient education pieces are longer, more sophisticated, and more detailed. These articles are written at the 10th to 12th grade reading level and are best for patients who want in-depth information and are comfortable with some medical jargon.  Here are the patient education articles that are relevant to this topic. We encourage you to print or e-mail these topics to your patients. (You can also locate patient education articles on a variety of subjects by searching on "patient info" and the keyword(s) of interest.)  ?Basics topics (see "Patient education: Tremor (The Basics)")  ?Beyond the Basics topics (see "Patient education: Tremor (Beyond the Basics)")    SUMMARY AND RECOMMENDATIONS  ?Classification - Clinical classification of tremor is based on history, tremor characteristics, associated neurologic and systemic signs, and, in some cases, additional testing. A vast number of diseases, disorders, medications, toxins, and substances cause tremor. Etiologies can be broadly classified as genetic, acquired, and idiopathic. (See 'Classification' above.)  ?Rest versus action - The activating conditions that give rise to a tremor are a key distinguishing feature (table 1). Rest tremor occurs in a body part that is fully supported and not voluntarily activated, whereas action tremor occurs with voluntary muscle contraction. Action tremors can be further characterized as kinetic, postural, and isometric. (See 'Rest versus action' above.)  ?Causes of rest tremor - Parkinson disease (PD) and other parkinsonian syndromes are the most common causes of rest tremor (table 2). (See 'Rest tremors' above.)  ?Causes of action tremor - Examples of " isolated tremor syndromes in which action tremor can be the sole neurologic symptom include (table 2):  Enhanced physiologic tremor (table 4) (see 'Physiologic tremor' above)  Essential tremor (ET) (table 5) (see 'Essential tremor' above)  Task-specific tremors (eg, writing tremor) (see 'Primary writing tremor' above)  Orthostatic tremor (see 'Orthostatic tremor' above)  Examples of combined tremor syndromes in which action tremor may be seen in association with other neurologic or systemic signs and symptoms include:  Cerebellar tremor (see 'Cerebellar tremor' above)  Neuropathic tremor (see 'Neuropathic tremor' above)  Dystonic tremor (see 'Dystonic tremor' above)  Functional tremor (see 'Functional tremors' above)  ?Evaluation - A detailed neurologic examination is important to identify specific features of the tremor (including its frequency, amplitude, pattern, and body distribution), activating conditions, and other neurologic findings, if present. (See 'Evaluation' above.)  The routine laboratory evaluation of tremor should include tests of thyroid function, diagnostic studies to exclude Luis Alfredo disease, and, rarely, screening for heavy metal poisoning such as mercury or arsenic if an environmental cause is suspected. Brain imaging is usually not indicated in patients with classic presentations of ET, PD, enhanced physiologic tremor, or drug-induced tremor. (See 'Laboratory studies' above.)  ?Treatment - Physical and occupational therapy may be of benefit in patients with limb tremor to help identify coping mechanisms and compensatory strategies. Some forms of tremor are responsive to symptomatic pharmacotherapy. (See 'Treatment' above.)

## 2025-07-08 ENCOUNTER — PATIENT MESSAGE (OUTPATIENT)
Dept: NEUROLOGY | Facility: CLINIC | Age: 55
End: 2025-07-08
Payer: COMMERCIAL

## 2025-08-07 ENCOUNTER — PATIENT MESSAGE (OUTPATIENT)
Dept: NEUROLOGY | Facility: CLINIC | Age: 55
End: 2025-08-07
Payer: COMMERCIAL

## 2025-08-07 RX ORDER — PROPRANOLOL HYDROCHLORIDE 10 MG/1
20 TABLET ORAL 2 TIMES DAILY PRN
Qty: 120 TABLET | Refills: 12 | Status: SHIPPED | OUTPATIENT
Start: 2025-08-07

## (undated) DEVICE — DRAPE OPMI STERILE

## (undated) DEVICE — ELECTRODE PATIENT RETURN DISP

## (undated) DEVICE — GLOVE PROTEXIS BLUE LATEX 8

## (undated) DEVICE — TUBING IRR BIPOLAR CORD 12FT

## (undated) DEVICE — SYR EPILOR LUER-LOK LOR 7ML

## (undated) DEVICE — NDL HYPO REG 25G X 1 1/2

## (undated) DEVICE — CANNULA AIRLIFE ETCO2 NSL 7FT

## (undated) DEVICE — SOL .9NACL PF 100 ML

## (undated) DEVICE — SOL NACL IRR 1000ML BTL

## (undated) DEVICE — NDL SPINAL 22GA 3.5 IN QUINCKE

## (undated) DEVICE — NDL SYR 10ML 18X1.5 LL BLUNT

## (undated) DEVICE — CONTRAST ISOVUE M 200 20ML VIL

## (undated) DEVICE — GLOVE PROTEXIS LTX MICRO 6.5

## (undated) DEVICE — KIT SURGIFLO HEMOSTATIC MATRIX

## (undated) DEVICE — DRAPE C-ARM COVER EZ 36X28IN

## (undated) DEVICE — GLOVE PROTEXIS PI SYN SURG 6.0

## (undated) DEVICE — GOWN SMARTSLEEVE AAMI LVL4 LG

## (undated) DEVICE — DRAPE MEDIUM SHEET 40X70IN

## (undated) DEVICE — GLOVE PROTEXIS PI SYN SURG 7.5

## (undated) DEVICE — CHLORAPREP 10.5 ML APPLICATOR

## (undated) DEVICE — DRAPE UTILITY W/ TAPE 20X30IN

## (undated) DEVICE — DRESSING TRANS 4X4 TEGADERM

## (undated) DEVICE — GLOVE PROTEXIS BLUE LATEX 6.5

## (undated) DEVICE — TRAY CATH FOL SIL URIMTR 16FR

## (undated) DEVICE — KIT SURGICAL TURNOVER

## (undated) DEVICE — NDL QUINCKE SPINAL YEL 20GX3IN

## (undated) DEVICE — GAUZE SPONGE 4X4 12PLY

## (undated) DEVICE — SYR 3ML LL 18GA 1.5IN

## (undated) DEVICE — DRAIN JACKSON PRATT TRCR 10FR

## (undated) DEVICE — GLOVE PROTEXIS PI SYN SURG 7

## (undated) DEVICE — Device

## (undated) DEVICE — SYR W NDL BLN FILL 5ML 18GX1.5

## (undated) DEVICE — DURAPREP SURG SCRUB 26ML

## (undated) DEVICE — DRAPE C-ARMOR EQUIPMENT COVER

## (undated) DEVICE — GLOVE PROTEXIS BLUE LATEX 7

## (undated) DEVICE — DRAPE TOP 53X102IN

## (undated) DEVICE — ADHESIVE DERMABOND ADVANCED

## (undated) DEVICE — POSITIONER HEAD ADULT

## (undated) DEVICE — TOOL DISECT MR8 10CM 2.2MM

## (undated) DEVICE — DRAPE FLUID WARMER 44X44IN

## (undated) DEVICE — SUT VICRYL PLUS 3-0 SH 18IN

## (undated) DEVICE — SPNG CHERRY DISECT XRAY DTECT

## (undated) DEVICE — ELECTRODE BLADE INSULATED 1 IN

## (undated) DEVICE — NDL EPIDURAL TOUHY 18G X3.5

## (undated) DEVICE — RESERVOIR JACKSON-PRATT 100CC

## (undated) DEVICE — COVER HD BACK TABLE 6FT

## (undated) DEVICE — DRAPE ORTH SPLIT 77X108IN

## (undated) DEVICE — TUBE SUCTION MEDI-VAC STERILE

## (undated) DEVICE — SHEET XLGE DRAPE

## (undated) DEVICE — SET SMARTSITE EXT SMALLBORE NF

## (undated) DEVICE — MARKER WRITESITE SKIN CHLRAPRP

## (undated) DEVICE — SUT STRATAFIX 4-0 30CM PS-2